# Patient Record
Sex: FEMALE | Race: WHITE | Employment: FULL TIME | URBAN - METROPOLITAN AREA
[De-identification: names, ages, dates, MRNs, and addresses within clinical notes are randomized per-mention and may not be internally consistent; named-entity substitution may affect disease eponyms.]

---

## 2017-12-19 ENCOUNTER — TELEPHONE (OUTPATIENT)
Dept: CARDIOLOGY CLINIC | Age: 47
End: 2017-12-19

## 2018-01-19 ENCOUNTER — HOSPITAL ENCOUNTER (OUTPATIENT)
Dept: MAMMOGRAPHY | Age: 48
Discharge: HOME | End: 2018-01-22
Attending: FAMILY MEDICINE | Admitting: FAMILY MEDICINE

## 2018-01-19 DIAGNOSIS — Z12.31 VISIT FOR SCREENING MAMMOGRAM: ICD-10-CM

## 2018-02-13 ENCOUNTER — HOSPITAL ENCOUNTER (OUTPATIENT)
Dept: WOMENS IMAGING | Age: 48
Discharge: HOME | End: 2018-02-13
Attending: OBSTETRICS & GYNECOLOGY | Admitting: OBSTETRICS & GYNECOLOGY

## 2018-02-13 DIAGNOSIS — R92.8 ABNORMAL MAMMOGRAM: ICD-10-CM

## 2018-02-13 DIAGNOSIS — N64.89 BREAST ASYMMETRY: ICD-10-CM

## 2019-01-02 ENCOUNTER — HOSPITAL ENCOUNTER (OUTPATIENT)
Dept: ULTRASOUND IMAGING | Age: 49
Discharge: HOME | End: 2019-01-02
Payer: COMMERCIAL

## 2019-01-02 ENCOUNTER — HOSPITAL ENCOUNTER (OUTPATIENT)
Dept: WOMENS IMAGING | Age: 49
Discharge: HOME | End: 2019-01-02
Payer: COMMERCIAL

## 2019-01-02 DIAGNOSIS — R92.8 ABNORMAL MAMMOGRAM: ICD-10-CM

## 2019-01-02 DIAGNOSIS — N64.89 BREAST ASYMMETRY: ICD-10-CM

## 2019-01-02 DIAGNOSIS — Z09 FOLLOW-UP EXAM: ICD-10-CM

## 2019-01-02 PROCEDURE — 76642 ULTRASOUND BREAST LIMITED: CPT

## 2019-01-02 PROCEDURE — 77066 DX MAMMO INCL CAD BI: CPT

## 2019-03-26 PROBLEM — Z95.828 PORT-A-CATH IN PLACE: Status: ACTIVE | Noted: 2018-02-12

## 2019-03-26 PROBLEM — C56.3 OVARIAN CANCER, BILATERAL (HCC): Status: ACTIVE | Noted: 2018-01-03

## 2019-03-26 PROBLEM — Z01.818 EXAMINATION PRIOR TO CHEMOTHERAPY: Status: ACTIVE | Noted: 2018-03-01

## 2019-03-26 PROBLEM — C54.1 ENDOMETRIAL CANCER (HCC): Status: ACTIVE | Noted: 2017-12-22

## 2019-04-01 ENCOUNTER — OFFICE VISIT (OUTPATIENT)
Dept: ORTHOPEDIC SURGERY | Age: 49
End: 2019-04-01
Payer: COMMERCIAL

## 2019-04-01 VITALS — WEIGHT: 293 LBS | BODY MASS INDEX: 48.82 KG/M2 | HEIGHT: 65 IN | RESPIRATION RATE: 18 BRPM

## 2019-04-01 DIAGNOSIS — M17.11 PRIMARY OSTEOARTHRITIS OF RIGHT KNEE: ICD-10-CM

## 2019-04-01 DIAGNOSIS — M17.12 PRIMARY OSTEOARTHRITIS OF LEFT KNEE: Primary | ICD-10-CM

## 2019-04-01 PROBLEM — C54.1 MALIGNANT NEOPLASM OF ENDOMETRIUM OF CORPUS UTERI (HCC): Status: ACTIVE | Noted: 2017-12-22

## 2019-04-01 PROBLEM — K58.0 IRRITABLE BOWEL SYNDROME WITH DIARRHEA: Status: ACTIVE | Noted: 2019-03-29

## 2019-04-01 PROBLEM — G89.29 CHRONIC PAIN OF LEFT KNEE: Status: ACTIVE | Noted: 2019-03-29

## 2019-04-01 PROBLEM — M25.562 CHRONIC PAIN OF LEFT KNEE: Status: ACTIVE | Noted: 2019-03-29

## 2019-04-01 PROBLEM — F41.9 ANXIETY: Status: ACTIVE | Noted: 2018-09-26

## 2019-04-01 PROCEDURE — 99203 OFFICE O/P NEW LOW 30 MIN: CPT | Performed by: PHYSICIAN ASSISTANT

## 2019-04-01 PROCEDURE — 20610 DRAIN/INJ JOINT/BURSA W/O US: CPT | Performed by: PHYSICIAN ASSISTANT

## 2019-04-01 RX ORDER — ALPRAZOLAM 0.5 MG/1
TABLET ORAL
COMMUNITY
Start: 2018-09-26 | End: 2019-12-18

## 2019-04-01 RX ORDER — DICYCLOMINE HCL 20 MG
TABLET ORAL
COMMUNITY
Start: 2019-02-05 | End: 2019-12-18

## 2019-04-01 RX ORDER — AZITHROMYCIN 250 MG/1
TABLET, FILM COATED ORAL
COMMUNITY
Start: 2019-03-29 | End: 2019-05-23

## 2019-04-01 ASSESSMENT — ENCOUNTER SYMPTOMS
WHEEZING: 1
EYES NEGATIVE: 1
GASTROINTESTINAL NEGATIVE: 1

## 2019-04-01 NOTE — PROGRESS NOTES
Scribe Authentication Statement  Arne Bence, scribed portions of this documentation for and in the presence of Rahul Hurt PA-C on 4/1/19 at 3:07 PM.    Provider Authentication Statement:  I, Olman Vasquez PA-C, personally performed the services described in this documentation and they were scribed in my presence by the above listed scribe. The documentation is both accurate and complete. Review of Systems   Constitutional: Negative. HENT: Negative. Eyes: Negative. Respiratory: Positive for wheezing. Cardiovascular: Negative. Gastrointestinal: Negative. Genitourinary: Negative. Musculoskeletal: Positive for arthralgias, gait problem, joint swelling and myalgias. Skin: Negative. Psychiatric/Behavioral: Negative. HPI:  Jeffery Villasenor is a 50y.o. year old female who complains of Bilateral knee pain with left>right. Left has been hurting for about 2 years or so and worse since she twisted it on 11/24/18. Right knee has been waxing and waning over the last several months. No fractures or surgeries on either knee and no consecutive treatment. She will take OTC NSAIDS PRN with little improvement. Left knee pain is over the medial and posterior aspect of the knee and the right knee pain is also over the medial side of the knee. She rates her left knee pain at a 7/10, her right knee pain at a 5/10. Pain is aching, worse with weightbearing and sitting for long periods of time and going to get up. Past Medical History:   Diagnosis Date    Diabetes ([facility] [address]     H/O cardiovascular stress test 3/17/2014    EF58% normal study    H/O echocardiogram 03/17/2014    EF 55%, normal LV systolic function, mildly dilated left atrium, normal sized right ventricle, normal valves, mild mitral and tricuspid regurg. No past surgical history on file.     Family History   Problem Relation Age of Onset    Diabetes Mother     Heart Disease Father     Diabetes Father     Hypertension Father     Cancer Father     Diabetes Sister     Cancer Maternal Grandmother     Diabetes Paternal Grandmother     Heart Attack Paternal Grandfather        Social History     Socioeconomic History    Marital status: Single     Spouse name: None    Number of children: None    Years of education: None    Highest education level: None   Occupational History    None   Social Needs    Financial resource strain: None    Food insecurity:     Worry: None     Inability: None    Transportation needs:     Medical: None     Non-medical: None   Tobacco Use    Smoking status: Passive Smoke Exposure - Never Smoker    Smokeless tobacco: Never Used   Substance and Sexual Activity    Alcohol use: No    Drug use: No    Sexual activity: None     Comment: single   Lifestyle    Physical activity:     Days per week: None     Minutes per session: None    Stress: None   Relationships    Social connections:     Talks on phone: None     Gets together: None     Attends [employer] service: None     Active member of club or organization: None     Attends meetings of clubs or organizations: None     Relationship status: None    Intimate partner violence:     Fear of current or ex partner: None     Emotionally abused: None     Physically abused: None     Forced sexual activity: None   Other Topics Concern    None   Social History Narrative    None       Current Outpatient Medications   Medication Sig Dispense Refill    azithromycin (ZITHROMAX) 250 MG tablet       dicyclomine (BENTYL) 20 MG tablet       glyBURIDE (DIABETA) 5 MG tablet       metFORMIN ER (GLUCOPHAGE-XR) 500 MG XR tablet       ALPRAZolam (XANAX) 0.5 MG tablet Take one tablet by mouth two times per day as needed for anxiety       No current facility-administered medications for this visit.         No Known Allergies    Review of Systems:  See above      Physical Exam:   Resp 18   Ht 5' 5\" (1.651 m)   Wt (!) 348 lb 9.6 oz (158.1 kg)   BMI 58.01 kg/m² Gait is Abnormal. The patient can bear weight on the injured extremity. Gen/Psych:Examination reveals a pleasant individual in no acute distress. The patient is oriented to time, place and person. The patient's mood and affect are appropriate. Patient appears well nourished. Body habitus is morbidly obese according to BMI. Lymph:  no obvious lymphedema in bilateral lower extremities     Skin intact in bilateral lower extremities with no ulcerations, lesions, rash, erythema. Vascular: There are mild varicosities in bilateral lower extremities, sensation intact to light touch over bilateral lower extremities. left leg/knee exam:  Leg alignment:     mild varus  Quadriceps/hamstring atrophy:   No  Knee effusion:    mild  Knee erythema:   no  ROM:     0-100°  Tenderness at:   Medial joint line    right leg/knee exam:  Leg alignment:     mild varus  Quadriceps/hamstring atrophy:   no  Knee effusion:    no   Knee erythema:   no  ROM:     0-100°  Tenderness at:   Medial joint line    Knee strength is 5/5 flexion and extension  There is + L2-S1 motor and sensory function. Outside record review: ER records and historical medical records    Left knee x-rays, four views,were obtained and reviewed and show bone-on-bone articulation in the medial compartment with osteophyte formation in the lateral tibial plateau, patellofemoral compartment. Subchondral sclerosis of the medial tibial plateau, no fractures, no dislocations. Right  knee x-rays, four views,were obtained and reviewed and show bone-on-bone articulation in the medial compartment of the right knee, subchondral sclerosis of the tibial plateau, lateral tibial plateau shows periarticular osteophytes with no fractures, no dislocations. Impression:  bilateral knee DJD- severe and symptomatic bilateral medial compartment DJD      Plan:  Natural history and expected course discussed. Questions answered.     Steroid injections given in both knees  Rest both knees 24-48 hours   Work on ROM and strengthening of knees and legs   OTC NSAIDS and Tylenol as needed if tolerated   Follow up as needed in 6 weeks if any persistent symptoms     The patient was advised that NSAID-type medications have two very important potential side effects: gastrointestinal irritation including hemorrhage and renal injuries. She was asked to take the medication with food and to stop if she experiences any GI upset. I asked her to call for vomiting, abdominal pain or black/bloody stools. The patient expresses understanding of these issues and questions were answered. Add Tylenol (also known as acetaminophen) as needed   Take 2 extra strength (500 mg) or 3 regular strength (325 mg) up to three times a day  It is OK to take Tylenol in conjunction with NSAID's (Advil, Aleve, Naprosyn, etc)  If taking other medications that have acetaminophen ensure total acetaminophen dose for any 24 period is less than 3,000 mg      right knee injection procedure note    Pre-procedure diagnosis:  right knee DJD    Post-procedure diagnosis:  same    Procedure: The planned procedure/risks/benefits/alternatives were discussed with the patient. Risks include, but are not limited to, increased pain, drug reaction, infection, bleeding, lack of improvement, neurovascular injury, and increased blood sugar levels. The patient understood and all of their questions were answered. The right knee was prepped with alcohol and betadine, then anesthetized with Ethyl Chloride spray. A 22 gauge needle was advanced into the inferior-lateral joint without difficulty. The joint was then injected with 3 ml 1% lidocaine and 2 ml of depo-medrol (80mg/ml). The injection site was cleansed with isopropyl alcohol and a band-aid was placed. Complications:  None, the patient   the procedure well. Instructions: The patient was advised to rest the knee and decrease activity for the next 24 to 48 hours.  May use

## 2019-04-01 NOTE — PATIENT INSTRUCTIONS
Steroid injections   Rest both knees 24-48 hours   Work on ROM and strengthening of knees and legs   OTC NSAIDS and Tylenol as needed if tolerated   Follow up as needed in 6 weeks if any persistent symptoms     The patient was advised that NSAID-type medications have two very important potential side effects: gastrointestinal irritation including hemorrhage and renal injuries. She was asked to take the medication with food and to stop if she experiences any GI upset. I asked her to call for vomiting, abdominal pain or black/bloody stools. The patient expresses understanding of these issues and questions were answered.     Add Tylenol (also known as acetaminophen) as needed   Take 2 extra strength (500 mg) or 3 regular strength (325 mg) up to three times a day  It is OK to take Tylenol in conjunction with NSAID's (Advil, Aleve, Naprosyn, etc)  If taking other medications that have acetaminophen ensure total acetaminophen dose for any 24 period is less than 3,000 mg

## 2019-04-25 PROBLEM — Z01.818 EXAMINATION PRIOR TO CHEMOTHERAPY: Status: RESOLVED | Noted: 2018-03-01 | Resolved: 2019-04-25

## 2019-05-23 ENCOUNTER — OFFICE VISIT (OUTPATIENT)
Dept: ORTHOPEDIC SURGERY | Age: 49
End: 2019-05-23
Payer: COMMERCIAL

## 2019-05-23 VITALS — RESPIRATION RATE: 16 BRPM | WEIGHT: 293 LBS | BODY MASS INDEX: 57.41 KG/M2

## 2019-05-23 DIAGNOSIS — E66.01 CLASS 3 SEVERE OBESITY WITH SERIOUS COMORBIDITY AND BODY MASS INDEX (BMI) OF 50.0 TO 59.9 IN ADULT, UNSPECIFIED OBESITY TYPE (HCC): ICD-10-CM

## 2019-05-23 DIAGNOSIS — M17.11 PRIMARY OSTEOARTHRITIS OF RIGHT KNEE: ICD-10-CM

## 2019-05-23 DIAGNOSIS — M17.12 PRIMARY OSTEOARTHRITIS OF LEFT KNEE: Primary | ICD-10-CM

## 2019-05-23 DIAGNOSIS — E66.01 MORBID OBESITY WITH BMI OF 50.0-59.9, ADULT (HCC): ICD-10-CM

## 2019-05-23 PROCEDURE — 99212 OFFICE O/P EST SF 10 MIN: CPT | Performed by: PHYSICIAN ASSISTANT

## 2019-05-23 RX ORDER — OMEGA-3 FATTY ACIDS/FISH OIL 300-1000MG
1 CAPSULE ORAL PRN
COMMUNITY

## 2019-05-23 ASSESSMENT — ENCOUNTER SYMPTOMS
EYES NEGATIVE: 1
WHEEZING: 1
GASTROINTESTINAL NEGATIVE: 1

## 2019-05-23 NOTE — PATIENT INSTRUCTIONS
Referral to Dr. Lisa Burton or Dr. Abelino Ramires for weight loss management  Follow up with Dr. Ron Perez as needed but not before meeting with Dr. Belen vega prescribed

## 2019-05-31 ENCOUNTER — TELEPHONE (OUTPATIENT)
Dept: ORTHOPEDIC SURGERY | Age: 49
End: 2019-05-31

## 2019-05-31 NOTE — TELEPHONE ENCOUNTER
Referral to Dr. Jose F Fishman or Dr. Mi Rodriguez for weight loss management. Patient called in stating that she can not do the weight lost due to insurance. Oncology doctor is sending her to a gym for her chemo.

## 2019-06-10 ENCOUNTER — HOSPITAL ENCOUNTER (OUTPATIENT)
Dept: WOMENS IMAGING | Age: 49
Discharge: HOME | End: 2019-06-10
Payer: COMMERCIAL

## 2019-06-10 DIAGNOSIS — Z12.31 ENCOUNTER FOR SCREENING MAMMOGRAM FOR BREAST CANCER: ICD-10-CM

## 2019-08-15 ENCOUNTER — OFFICE VISIT (OUTPATIENT)
Dept: ORTHOPEDIC SURGERY | Age: 49
End: 2019-08-15
Payer: COMMERCIAL

## 2019-08-15 VITALS
BODY MASS INDEX: 48.82 KG/M2 | WEIGHT: 293 LBS | RESPIRATION RATE: 14 BRPM | HEIGHT: 65 IN | HEART RATE: 87 BPM | OXYGEN SATURATION: 97 %

## 2019-08-15 DIAGNOSIS — M17.12 PRIMARY OSTEOARTHRITIS OF LEFT KNEE: Primary | ICD-10-CM

## 2019-08-15 DIAGNOSIS — M17.11 PRIMARY OSTEOARTHRITIS OF RIGHT KNEE: ICD-10-CM

## 2019-08-15 PROCEDURE — 20610 DRAIN/INJ JOINT/BURSA W/O US: CPT | Performed by: PHYSICIAN ASSISTANT

## 2019-08-15 PROCEDURE — 99212 OFFICE O/P EST SF 10 MIN: CPT | Performed by: PHYSICIAN ASSISTANT

## 2019-08-15 ASSESSMENT — ENCOUNTER SYMPTOMS
EYES NEGATIVE: 1
GASTROINTESTINAL NEGATIVE: 1
WHEEZING: 1

## 2019-08-15 NOTE — PROGRESS NOTES
I reviewed and agree with the portions of the HPI, review of systems, vital documentation and plan performed by my staff and have added/addended where appropriate. Review of Systems   Constitutional: Negative. HENT: Negative. Eyes: Negative. Respiratory: Positive for wheezing. Cardiovascular: Negative. Gastrointestinal: Negative. Genitourinary: Negative. Musculoskeletal: Positive for arthralgias, gait problem, joint swelling and myalgias. Skin: Negative. Psychiatric/Behavioral: Negative. HPI:  Ruben Gaines is a 50y.o. year old female here for a follow up visit to receive steroid injection for bilateral knee prior to her return to school. Last injection was administered about four months ago on April 1st, but only offered relief for about a month before pain returned. Patient was unable to follow up with weight loss management due to insurance denial, but has been participating with [facility] [address] local cancer organization to do core strengthening. Patient continues to have bilateral knee pain at this time along the anterior aspect of the knee with radiation of pain down the bilateral with radiation of pain worse in the left leg. Associated sx: numbness and tingling of the toes but contributes this to after effects of chemo. Pain is worse upon long periods of ambulation and relieved by the use of compound cream. No new injuries reported. Hx of endo and ovarian cancer. Past Medical History:   Diagnosis Date    Diabetes ([facility] [address]     H/O cardiovascular stress test 3/17/2014    EF58% normal study    H/O echocardiogram 03/17/2014    EF 55%, normal LV systolic function, mildly dilated left atrium, normal sized right ventricle, normal valves, mild mitral and tricuspid regurg. No past surgical history on file.     Family History   Problem Relation Age of Onset    Diabetes Mother     Heart Disease Father     Diabetes Father     Hypertension Father     Cancer Father    Terry Finley kg/m²        Gait is Abnormal. The patient can bear weight on the injured extremity. Gen/Psych:Examination reveals a pleasant individual in no acute distress. The patient is oriented to time, place and person. The patient's mood and affect are appropriate. Patient appears well nourished. Body habitus is morbidly obese according to BMI. Lymph:  no obvious lymphedema in bilateral lower extremities     Skin intact in bilateral lower extremities with no ulcerations, lesions, rash, erythema. Vascular: There are mild varicosities in bilateral lower extremities, sensation intact to light touch over bilateral lower extremities. left leg/knee exam:  Leg alignment:     mild varus  Quadriceps/hamstring atrophy:   No  Knee effusion:    mild  Knee erythema:   no  ROM:     0-110°  Tenderness at:   Medial joint line    right leg/knee exam:  Leg alignment:     mild varus  Quadriceps/hamstring atrophy:   no  Knee effusion:    no   Knee erythema:   no  ROM:     0-110°  Tenderness at:   Medial joint line    Knee strength is 5/5 flexion and extension  There is + L2-S1 motor and sensory function. record review: ER records and historical medical records, office notes       Impression:  bilateral knee DJD- severe and symptomatic bilateral medial compartment DJD    Morbid obesity      Plan:  Natural history and expected course discussed. Questions answered. Steroid injections given for bilateral knee  Rest both knees 24-48 hours   Work on ROM and strengthening of knees and legs   OTC NSAIDS and Tylenol as needed if tolerated     Right  knee injection procedure note    Pre-procedure diagnosis: Right knee DJD    Post-procedure diagnosis:  same    Procedure: The planned procedure/risks/benefits/alternatives were discussed with the patient. Risks include, but are not limited to, increased pain, drug reaction, infection, bleeding, lack of improvement, neurovascular injury, and increased blood sugar levels.   The patient

## 2019-08-15 NOTE — PATIENT INSTRUCTIONS
Steroid injections given for bilateral knee  Rest both knees 24-48 hours   Work on ROM and strengthening of knees and legs   OTC NSAIDS and Tylenol as needed if tolerated

## 2019-09-20 ENCOUNTER — HOSPITAL ENCOUNTER (OUTPATIENT)
Dept: GENERAL RADIOLOGY | Age: 49
Discharge: HOME | End: 2019-09-20
Payer: COMMERCIAL

## 2019-09-20 ENCOUNTER — HOSPITAL ENCOUNTER (OUTPATIENT)
Age: 49
Discharge: HOME | End: 2019-09-20
Payer: COMMERCIAL

## 2019-09-20 DIAGNOSIS — N28.89 URETERAL FISTULA: ICD-10-CM

## 2019-09-20 PROCEDURE — 71046 X-RAY EXAM CHEST 2 VIEWS: CPT

## 2019-11-10 ENCOUNTER — HOSPITAL ENCOUNTER (EMERGENCY)
Age: 49
Discharge: HOME | End: 2019-11-10
Payer: COMMERCIAL

## 2019-11-10 VITALS
SYSTOLIC BLOOD PRESSURE: 153 MMHG | OXYGEN SATURATION: 96 % | BODY MASS INDEX: 43.4 KG/M2 | TEMPERATURE: 97.6 F | DIASTOLIC BLOOD PRESSURE: 84 MMHG | HEIGHT: 69 IN | WEIGHT: 293 LBS | HEART RATE: 91 BPM | RESPIRATION RATE: 18 BRPM

## 2019-11-10 DIAGNOSIS — J04.0 LARYNGITIS: Primary | ICD-10-CM

## 2019-11-10 PROCEDURE — 99282 EMERGENCY DEPT VISIT SF MDM: CPT

## 2019-12-18 ENCOUNTER — OFFICE VISIT (OUTPATIENT)
Dept: ORTHOPEDIC SURGERY | Age: 49
End: 2019-12-18
Payer: COMMERCIAL

## 2019-12-18 VITALS — RESPIRATION RATE: 18 BRPM | BODY MASS INDEX: 48.82 KG/M2 | HEIGHT: 65 IN | WEIGHT: 293 LBS

## 2019-12-18 DIAGNOSIS — M17.12 PRIMARY OSTEOARTHRITIS OF LEFT KNEE: ICD-10-CM

## 2019-12-18 DIAGNOSIS — M17.11 PRIMARY OSTEOARTHRITIS OF RIGHT KNEE: Primary | ICD-10-CM

## 2019-12-18 PROCEDURE — 99212 OFFICE O/P EST SF 10 MIN: CPT | Performed by: PHYSICIAN ASSISTANT

## 2019-12-18 PROCEDURE — 20610 DRAIN/INJ JOINT/BURSA W/O US: CPT | Performed by: PHYSICIAN ASSISTANT

## 2019-12-18 RX ORDER — ZOLPIDEM TARTRATE 10 MG/1
10 TABLET ORAL NIGHTLY PRN
COMMUNITY
Start: 2019-07-03

## 2019-12-18 ASSESSMENT — ENCOUNTER SYMPTOMS
EYES NEGATIVE: 1
GASTROINTESTINAL NEGATIVE: 1

## 2020-01-08 ENCOUNTER — ANESTHESIA EVENT (OUTPATIENT)
Dept: ENDOSCOPY | Age: 50
End: 2020-01-08
Payer: COMMERCIAL

## 2020-01-08 NOTE — PROGRESS NOTES
Surgery: [email or web address]                         Arrival time: 0930  Nothing to eat or drink after midnight unless instructed to take certain medications by the doctor or the nurse the am of surgery  Arrive at the front information desk -1st floor /[facility] is on [address] [facility]  Please leave money and all other valuables at home. Wear comfortable clothing. If you wear contacts please bring a case. No make up. You may be asked to remove rings or body piercing. Please bring insurance cards and picture ID am of procedure. Please bring any consent or paper work from your doctor. If you become ill,such as a cold, sore throat or fever contact your doctor. Please bathe or shower am of procedure.   Medications to take AM of procedure:     Any questions call [facility]  [phone]

## 2020-01-09 ENCOUNTER — HOSPITAL ENCOUNTER (OUTPATIENT)
Age: 50
Setting detail: OUTPATIENT SURGERY
Discharge: HOME | End: 2020-01-09
Attending: INTERNAL MEDICINE | Admitting: INTERNAL MEDICINE
Payer: COMMERCIAL

## 2020-01-09 ENCOUNTER — ANESTHESIA (OUTPATIENT)
Dept: ENDOSCOPY | Age: 50
End: 2020-01-09
Payer: COMMERCIAL

## 2020-01-09 VITALS — OXYGEN SATURATION: 100 % | DIASTOLIC BLOOD PRESSURE: 46 MMHG | SYSTOLIC BLOOD PRESSURE: 117 MMHG

## 2020-01-09 VITALS
OXYGEN SATURATION: 100 % | DIASTOLIC BLOOD PRESSURE: 93 MMHG | RESPIRATION RATE: 16 BRPM | WEIGHT: 293 LBS | BODY MASS INDEX: 48.82 KG/M2 | TEMPERATURE: 96.7 F | HEIGHT: 65 IN | SYSTOLIC BLOOD PRESSURE: 137 MMHG | HEART RATE: 70 BPM

## 2020-01-09 LAB — GLUCOSE BLD-MCNC: 149 MG/DL (ref 70–99)

## 2020-01-09 PROCEDURE — 82962 GLUCOSE BLOOD TEST: CPT

## 2020-01-09 PROCEDURE — 3700000001 HC ADD 15 MINUTES (ANESTHESIA): Performed by: INTERNAL MEDICINE

## 2020-01-09 PROCEDURE — 2580000003 HC RX 258: Performed by: ANESTHESIOLOGY

## 2020-01-09 PROCEDURE — 3609010300 HC COLONOSCOPY W/BIOPSY SINGLE/MULTIPLE: Performed by: INTERNAL MEDICINE

## 2020-01-09 PROCEDURE — 88342 IMHCHEM/IMCYTCHM 1ST ANTB: CPT

## 2020-01-09 PROCEDURE — 2709999900 HC NON-CHARGEABLE SUPPLY: Performed by: INTERNAL MEDICINE

## 2020-01-09 PROCEDURE — 88305 TISSUE EXAM BY PATHOLOGIST: CPT

## 2020-01-09 PROCEDURE — 7100000010 HC PHASE II RECOVERY - FIRST 15 MIN: Performed by: INTERNAL MEDICINE

## 2020-01-09 PROCEDURE — 3700000000 HC ANESTHESIA ATTENDED CARE: Performed by: INTERNAL MEDICINE

## 2020-01-09 PROCEDURE — 3609012400 HC EGD TRANSORAL BIOPSY SINGLE/MULTIPLE: Performed by: INTERNAL MEDICINE

## 2020-01-09 PROCEDURE — 6360000002 HC RX W HCPCS: Performed by: NURSE ANESTHETIST, CERTIFIED REGISTERED

## 2020-01-09 PROCEDURE — 2500000003 HC RX 250 WO HCPCS: Performed by: NURSE ANESTHETIST, CERTIFIED REGISTERED

## 2020-01-09 PROCEDURE — 6360000002 HC RX W HCPCS

## 2020-01-09 PROCEDURE — 7100000011 HC PHASE II RECOVERY - ADDTL 15 MIN: Performed by: INTERNAL MEDICINE

## 2020-01-09 RX ORDER — PANTOPRAZOLE SODIUM 40 MG/10ML
40 INJECTION, POWDER, LYOPHILIZED, FOR SOLUTION INTRAVENOUS DAILY
COMMUNITY

## 2020-01-09 RX ORDER — PROPOFOL 10 MG/ML
INJECTION, EMULSION INTRAVENOUS PRN
Status: DISCONTINUED | OUTPATIENT
Start: 2020-01-09 | End: 2020-01-09 | Stop reason: SDUPTHER

## 2020-01-09 RX ORDER — SODIUM CHLORIDE, SODIUM LACTATE, POTASSIUM CHLORIDE, CALCIUM CHLORIDE 600; 310; 30; 20 MG/100ML; MG/100ML; MG/100ML; MG/100ML
INJECTION, SOLUTION INTRAVENOUS CONTINUOUS
Status: DISCONTINUED | OUTPATIENT
Start: 2020-01-09 | End: 2020-01-09 | Stop reason: HOSPADM

## 2020-01-09 RX ORDER — LIDOCAINE HYDROCHLORIDE 20 MG/ML
INJECTION, SOLUTION EPIDURAL; INFILTRATION; INTRACAUDAL; PERINEURAL PRN
Status: DISCONTINUED | OUTPATIENT
Start: 2020-01-09 | End: 2020-01-09 | Stop reason: SDUPTHER

## 2020-01-09 RX ADMIN — PROPOFOL 20 MG: 10 INJECTION, EMULSION INTRAVENOUS at 11:58

## 2020-01-09 RX ADMIN — PROPOFOL 50 MG: 10 INJECTION, EMULSION INTRAVENOUS at 11:40

## 2020-01-09 RX ADMIN — LIDOCAINE HYDROCHLORIDE 50 MG: 20 INJECTION, SOLUTION EPIDURAL; INFILTRATION; INTRACAUDAL; PERINEURAL at 11:40

## 2020-01-09 RX ADMIN — PROPOFOL 30 MG: 10 INJECTION, EMULSION INTRAVENOUS at 11:50

## 2020-01-09 RX ADMIN — LIDOCAINE HYDROCHLORIDE 50 MG: 20 INJECTION, SOLUTION EPIDURAL; INFILTRATION; INTRACAUDAL; PERINEURAL at 11:41

## 2020-01-09 RX ADMIN — PROPOFOL 30 MG: 10 INJECTION, EMULSION INTRAVENOUS at 11:44

## 2020-01-09 RX ADMIN — PROPOFOL 30 MG: 10 INJECTION, EMULSION INTRAVENOUS at 11:53

## 2020-01-09 RX ADMIN — SODIUM CHLORIDE, POTASSIUM CHLORIDE, SODIUM LACTATE AND CALCIUM CHLORIDE: 600; 310; 30; 20 INJECTION, SOLUTION INTRAVENOUS at 11:37

## 2020-01-09 RX ADMIN — PROPOFOL 30 MG: 10 INJECTION, EMULSION INTRAVENOUS at 11:47

## 2020-01-09 RX ADMIN — PROPOFOL 50 MG: 10 INJECTION, EMULSION INTRAVENOUS at 11:41

## 2020-01-09 ASSESSMENT — PAIN SCALES - GENERAL: PAINLEVEL_OUTOF10: 0

## 2020-01-09 NOTE — PROGRESS NOTES
Patient returned to room from endoscopy, report received from [facility]. A+Ox4,  VSS (see doc flow), assessment completed as per doc flow. Patient has no c/o pain. Beverage offered. Call light in reach, bed in low position. RN to continue to monitor. Will call to waiting room for family.

## 2020-01-09 NOTE — OP NOTE
[facility]      BRIEF OP REPORT:    Impression:    1) EGD: Grade II esophagitis and mild gastritis H pylori biopsy done    2)C scope mild edematous mucosa all over biopsy of asc and sophia colon for colitis done, Grade II Hemorrhoids seen           Suggest:   1) PPI QD, GERD measures   2) High Fiber Diet, FU 2 weeks Recall C scope at age 48        Full EGD/COLONOSCOPY report available by going to \"chart review\" then \"procedures\" then  \"EGD/Colonoscopy\"  then \"View Endoscopy Report\"

## 2020-01-09 NOTE — H&P
[address] [facility]   Pre-operative History and Physical    Patient: Sunshine Lang  : 1970      History Obtained From:  patient        HISTORY OF PRESENT ILLNESS:                The patient is a 52 y.o. female with significant past medical history as below who presents for EGD and C scope    Indication abd pain    Past Medical History:        Diagnosis Date    Arthritis     Cancer ([facility] [address]     [facility] treatment Ovarian and endometrial CA    Diabetes ([facility] [address]    [employer] H/O cardiovascular stress test 3/17/2014    EF58% normal study    H/O echocardiogram 2014    EF 55%, normal LV systolic function, mildly dilated left atrium, normal sized right ventricle, normal valves, mild mitral and tricuspid regurg. Past Surgical History:        Procedure Laterality Date    HYSTERECTOMY      2017    TONSILLECTOMY           Current Medications:    Medications    Prior to Admission medications    Medication Sig Start Date End Date Taking? Authorizing Provider   pantoprazole (PROTONIX) 40 MG injection Infuse 40 mg intravenously daily   Yes Historical Provider, MD   zolpidem (AMBIEN) 10 MG tablet Take 10 mg by mouth nightly as needed. 7/3/19  Yes Historical Provider, MD   ibuprofen (ADVIL) 200 MG CAPS Take 1 capsule by mouth as needed    Yes Historical Provider, MD   glyBURIDE (DIABETA) 5 MG tablet 2 times daily (with meals)  14   Historical Provider, MD   metFORMIN ER (GLUCOPHAGE-XR) 500 MG XR tablet 2 times daily  14   Historical Provider, MD      Scheduled Medications:   Infusions:    lactated ringers       PRN Medications: Allergies: No Known Allergies      Allergies:  Patient has no known allergies. Social History:   TOBACCO:   reports that she is a non-smoker but has been exposed to tobacco smoke. She has never used smokeless tobacco.  ETOH:   reports no history of alcohol use.     Family History:       Problem Relation Age of Onset  Diabetes Mother     Heart Disease Father     Diabetes Father     Hypertension Father     Cancer Father     Diabetes Sister     Cancer Maternal Grandmother     Diabetes Paternal Grandmother     Heart Attack Paternal Grandfather        REVIEW OF SYSTEMS:    Negative except for HPI        PHYSICAL EXAM:      Vitals:    BP (!) 183/88   Pulse 90   Temp 96.6 °F (35.9 °C) (Temporal)   Resp 17   Ht 5' 5\" (1.651 m)   Wt (!) 345 lb (156.5 kg)   SpO2 97%   BMI 57.41 kg/m²     General Appearance:    Alert, cooperative, no distress, appears stated age   HEENT:    NO anemia, No jaundice , no Cyanosis, Supple neck   Neck:   Supple, symmetrical, trachea midline, no adenopathy;     thyroid:    Lungs:     Clear to auscultation bilaterally, respirations unlabored   Chest Wall:    No tenderness or deformity    Heart:    Regular rate and rhythm, S1 and S2 normal, no murmur, rub   or gallop   Abdomen:     Soft, non-tender, bowel sounds active all four quadrants,     no masses, no organomegaly, no ascitis   Rectal:    Planned at time of C scope   Extremities:   Extremities normal, atraumatic, no cyanosis or edema   Pulses:   2+ and symmetric all extremities   Skin:   Skin color, texture, turgor normal, no rashes or lesions   Lymph nodes:   No abnormality   Neurologic:   No focal deficits, moving all four extremities      ASA Grade:  ASA 3 - Patient with moderate systemic disease with functional limitations  Air [address]:    Prior Anesthesia complication: NILL    ASSESSMENT AND PLAN:    1. Patient is a 52 y.o. female here for EGD and colonoscopy with deep sedation  2. Procedure options, risks and benefits reviewed with patient. Patient expresses understanding.     Melissa Cardenas  1/9/2020  10:13 AM

## 2020-01-09 NOTE — ANESTHESIA POSTPROCEDURE EVALUATION
Department of Anesthesiology  Postprocedure Note    Patient: Soy Peterson  MRN: [id number]  YOB: 1970  Date of evaluation: 1/9/2020  Time:  12:21 PM     Procedure Summary     Date:  01/09/20 Room / Location:  [address] [place] [address]    Anesthesia Start:  1137 Anesthesia Stop:      Procedures:       COLONOSCOPY WITH BIOPSY (N/A )      EGD BIOPSY (N/A ) Diagnosis:  (BURPING , PERSONAL H/O MALIGNANT NEOPLASM OF OVARY , GENERALIZED ABDOMINAL PAIN)    Surgeon:  Rosibel Shankar MD Responsible Provider:  Alexi Lui MD    Anesthesia Type:  MAC ASA Status:  4          Anesthesia Type: MAC    Peter Phase I:      Peter Phase II:      Last vitals: Reviewed and per EMR flowsheets.        Anesthesia Post Evaluation    Patient location during evaluation: bedside  Patient participation: complete - patient participated  Level of consciousness: sleepy but conscious  Pain score: 0  Airway patency: patent  Nausea & Vomiting: no nausea and no vomiting  Complications: no  Cardiovascular status: blood pressure returned to baseline and hemodynamically stable  Respiratory status: acceptable, spontaneous ventilation and room air  Hydration status: euvolemic [facility]    Chief Complaint   Patient presents with     Nursing Home Acute       HPI:    Benson Kapoor is a 89 year old  (5/20/1925), who is being seen today for an episodic care [facility] at [facility].    HPI information obtained from: facility chart records and facility staff. Today's concern is:  Patient tested positive for influenza B last week was treated with tamiflu   She is a hospice patient with [facility].   Her status was very poor last week with oxygen at 40% despite supplement oxygen, decreased LOC. On Tuesday she was improving but still with lingering cough.   She was started on prednisone 40 mg daily for 5 days   She is much better today   She was requesting to go out to dining room for meals.   Isolation precautions d/c'ed   By the afternoon she was bright sitting in wheelchair and participating in activities.     REVIEW OF SYSTEMS:  4 point ROS including Respiratory, CV, GI and , other than that noted in the HPI,  is negative    /76  Pulse 89  Temp 98.6  F (37  C)  Resp 20  Wt 128 lb 8 oz (58.3 kg)  SpO2 93%  BMI 20.74 kg/m2  GENERAL APPEARANCE:  Alert, in no distress  RESP:  respiratory effort and palpation of chest normal, auscultation of lungs 2 , no respiratory distress  CV:  Palpation and auscultation of heart done , rate and rhythm clear but diminished, no murmur, no peripheral edema  ABDOMEN:  normal bowel sounds, soft, nontender, no hepatosplenomegaly or other masses  M/S:   Gait and station wheelchair bound, Digits and nails intact  SKIN:  Inspection and Palpation of skin and subcutaneous tissue   NEURO: 2-12 in normal limits and at patient's baseline  PSYCH:  insight and judgement, memory impaired , affect and mood normal      ASSESSMENT/PLAN:     Influenza B  Hospice care patient  Mild persistent asthma without complication  Chronic bronchitis, unspecified chronic bronchitis type (H)      Orders:  1.  DC isolation precautions      Total  time spent with patient visit at the skilled nursing facility was 25 min including patient visit and review of past records. Greater than 50% of total time spent with counseling and coordinating care due to impaired memory.    Electronically signed by:  Rajni Shah NP

## 2020-02-26 ENCOUNTER — HOSPITAL ENCOUNTER (OUTPATIENT)
Dept: WOMENS IMAGING | Age: 50
Discharge: HOME | End: 2020-02-26
Payer: COMMERCIAL

## 2020-02-26 PROCEDURE — 77067 SCR MAMMO BI INCL CAD: CPT

## 2020-04-17 ENCOUNTER — HOSPITAL ENCOUNTER (OUTPATIENT)
Dept: CT IMAGING | Age: 50
Discharge: HOME | End: 2020-04-17
Payer: COMMERCIAL

## 2020-04-17 PROCEDURE — 74150 CT ABDOMEN W/O CONTRAST: CPT

## 2021-05-07 ENCOUNTER — HOSPITAL ENCOUNTER (OUTPATIENT)
Dept: ULTRASOUND IMAGING | Age: 51
Discharge: HOME | End: 2021-05-07
Payer: COMMERCIAL

## 2021-05-07 DIAGNOSIS — N28.89 URETERAL FISTULA: ICD-10-CM

## 2021-05-07 PROCEDURE — 76775 US EXAM ABDO BACK WALL LIM: CPT

## 2021-10-18 ENCOUNTER — OFFICE VISIT (OUTPATIENT)
Dept: ORTHOPEDIC SURGERY | Age: 51
End: 2021-10-18
Payer: COMMERCIAL

## 2021-10-18 VITALS
BODY MASS INDEX: 48.82 KG/M2 | WEIGHT: 293 LBS | OXYGEN SATURATION: 93 % | RESPIRATION RATE: 16 BRPM | HEART RATE: 103 BPM | HEIGHT: 65 IN

## 2021-10-18 DIAGNOSIS — M17.12 PRIMARY OSTEOARTHRITIS OF LEFT KNEE: Primary | ICD-10-CM

## 2021-10-18 PROCEDURE — 20610 DRAIN/INJ JOINT/BURSA W/O US: CPT | Performed by: PHYSICIAN ASSISTANT

## 2021-10-18 PROCEDURE — 99213 OFFICE O/P EST LOW 20 MIN: CPT | Performed by: PHYSICIAN ASSISTANT

## 2021-10-18 NOTE — PATIENT INSTRUCTIONS
Steroid injection   Rest the knee for 24-48 hours  Weightbearing and activities as tolerated  May take Ibuprofen or Motrin as needed  Rest, ice, and elevate as needed  Work on ROM and strengthening exercises as discussed  Follow up as needed

## 2021-10-18 NOTE — PROGRESS NOTES
Review of Systems   Constitutional: Negative. HENT: Negative. Eyes: Negative. Respiratory: Negative. Cardiovascular: Negative. Gastrointestinal: Negative. Genitourinary: Negative. Musculoskeletal: Positive for arthralgias and myalgias. Skin: Negative. Neurological: Negative. Psychiatric/Behavioral: Negative. HPI:  Angle De La Cruz is a 46 y.o. female that presents the office today to have her left knee looked at again. She was seen in the office before about 2 years ago and had an injection in the left knee. She states that the left knee injection at that time did work well but she started to have more pain in the left knee. She rates her pain at a 6/10, aching in nature. Past Medical History:   Diagnosis Date    Arthritis     Cancer [facility] treatment Ovarian and endometrial CA    Diabetes ([facility] [address]     H/O cardiovascular stress test 3/17/2014    EF58% normal study    H/O echocardiogram 03/17/2014    EF 55%, normal LV systolic function, mildly dilated left atrium, normal sized right ventricle, normal valves, mild mitral and tricuspid regurg.        Past Surgical History:   Procedure Laterality Date    COLONOSCOPY N/A 1/9/2020    COLONOSCOPY WITH BIOPSY performed by Lorie Rivers MD at [address] [place] [address]      12/22/2017    TONSILLECTOMY      UPPER GASTROINTESTINAL ENDOSCOPY N/A 1/9/2020    EGD BIOPSY performed by Lorie Rivers MD at [facility] ENDOSCOPY       Family History   Problem Relation Age of Onset    Diabetes Mother     Heart Disease Father     Diabetes Father     Hypertension Father     Cancer Father     Diabetes Sister     Cancer Maternal Grandmother     Diabetes Paternal Grandmother     Heart Attack Paternal Grandfather        Social History     Socioeconomic History    Marital status: Single     Spouse name: None    Number of children: None    Years of education: None    Highest education level: None   Occupational History    None   Tobacco Use    Smoking status: Passive Smoke Exposure - Never Smoker    Smokeless tobacco: Never Used   Vaping Use    Vaping Use: Never used   Substance and Sexual Activity    Alcohol use: No    Drug use: No    Sexual activity: None     Comment: single   Other Topics Concern    None   Social History Narrative    None     Social Determinants of Health     Financial Resource Strain:     Difficulty of Paying Living Expenses:    Food Insecurity:     Worried About Running Out of Food in the Last Year:     Ran Out of Food in the Last Year:    Transportation Needs:     Lack of Transportation (Medical):  Lack of Transportation (Non-Medical):    Physical Activity:     Days of Exercise per Week:     Minutes of Exercise per Session:    Stress:     Feeling of Stress :    Social Connections:     Frequency of Communication with Friends and Family:     Frequency of Social Gatherings with Friends and Family:     Attends Jainism Services:     Active Member of Clubs or Organizations:     Attends Club or Organization Meetings:     Marital Status:    Intimate Partner Violence:     Fear of Current or Ex-Partner:     Emotionally Abused:     Physically Abused:     Sexually Abused:        Current Outpatient Medications   Medication Sig Dispense Refill    SITagliptin (JANUVIA) 50 MG tablet Take 50 mg by mouth daily      glyBURIDE (DIABETA) 5 MG tablet 2 times daily (with meals)       metFORMIN ER (GLUCOPHAGE-XR) 500 MG XR tablet 2 times daily       pantoprazole (PROTONIX) 40 MG injection Infuse 40 mg intravenously daily (Patient not taking: Reported on 10/18/2021)      zolpidem (AMBIEN) 10 MG tablet Take 10 mg by mouth nightly as needed. (Patient not taking: Reported on 10/18/2021)      ibuprofen (ADVIL) 200 MG CAPS Take 1 capsule by mouth as needed  (Patient not taking: Reported on 10/18/2021)       No current facility-administered medications for this visit.        No Known Allergies    Review of Systems:  See above      Physical Exam:   Pulse 103   Resp 16   Ht 5' 5\" (1.651 m)   Wt (!) 345 lb (156.5 kg)   SpO2 93%   BMI 57.41 kg/m²        Gait is Normal. The patient can bear weight on the injured extremity. Gen/Psych:Examination reveals a pleasant individual in no acute distress. The patient is oriented to time, place and person. The patient's mood and affect are appropriate. Patient appears well nourished. Body habitus is morbidly obese     Lymph:  mild lymphedema in bilateral lower extremities     Skin intact in bilateral lower extremities with no ulcerations, lesions, rash, erythema. Vascular: There are mild varicosities in bilateral lower extremities, sensation intact to light touch over bilateral lower extremities. left leg/knee exam:  Leg alignment:     neutral  Quadriceps/hamstring atrophy:   no  Knee effusion:    no   Knee erythema:   no  ROM:     Full, 0-120 degrees  Varus laxity at 0 and 30 deg's: no  Valguslaxity at 0 and 30 deg's: no  Recurvatum:    no  Tenderness at:   Medial joint line      Bilateral Knee strength is 5/5 flexion and extension  There is + L2-S1 motor and sensory function in bilateral lower extremities    Outside record review: office notes and prior x-rays    Imaging studies:  4 views of the left knee taken reviewed in the office today show severe tricompartmental osteoarthritic changes with bone-on-bone articulation the medial compartment. The official read and interpretation of these x-rays will be done by the the [facility]         Impression:     Diagnosis Orders   1.  Primary osteoarthritis of left knee  [place] ARTHROCENTESIS ASPIR&/INJ MAJOR JT/BURSA W/O US           Plan:    Patient Instructions   Steroid injection   Rest the knee for 24-48 hours  Weightbearing and activities as tolerated  May take Ibuprofen or Motrin as needed  Rest, ice, and elevate as needed  Work on ROM and strengthening exercises as discussed  Follow up as needed    Left knee injection procedure note    Pre-procedure diagnosis:  Left knee DJD    Post-procedure diagnosis:  same    Procedure: The planned procedure/risks/benefits/alternatives were discussed with the patient. Risks include, but are not limited to, increased pain, drug reaction, infection, bleeding, lack of improvement, neurovascular injury, and increased blood sugar levels. The patient understood and all of their questions were answered. The Left knee was prepped with alcohol then a 22 gauge needle was advanced into the inferior-lateral joint without difficulty. The joint was then injected with 1 ml 1% lidocaine, 1ml of Kenalog (40 mg/ml), 1ml 0.5% bupivacaine the injection site was cleansed with isopropyl alcohol and a band-aid was placed. Complications:  None, the patient tolerated the procedure well. Instructions: The patient was advised to rest the knee and decrease activity for the next 24 to 48 hours. May use prescription or OTC pain relievers as needed for any post-injection pain as well as ice.

## 2021-10-21 ASSESSMENT — ENCOUNTER SYMPTOMS
EYES NEGATIVE: 1
GASTROINTESTINAL NEGATIVE: 1
RESPIRATORY NEGATIVE: 1

## 2022-02-17 ENCOUNTER — OFFICE VISIT (OUTPATIENT)
Dept: ORTHOPEDIC SURGERY | Age: 52
End: 2022-02-17
Payer: COMMERCIAL

## 2022-02-17 VITALS
HEIGHT: 65 IN | RESPIRATION RATE: 16 BRPM | OXYGEN SATURATION: 97 % | WEIGHT: 293 LBS | BODY MASS INDEX: 48.82 KG/M2 | HEART RATE: 87 BPM

## 2022-02-17 DIAGNOSIS — M17.11 PRIMARY OSTEOARTHRITIS OF RIGHT KNEE: ICD-10-CM

## 2022-02-17 DIAGNOSIS — M17.12 PRIMARY OSTEOARTHRITIS OF LEFT KNEE: Primary | ICD-10-CM

## 2022-02-17 PROCEDURE — G8427 DOCREV CUR MEDS BY ELIG CLIN: HCPCS | Performed by: PHYSICIAN ASSISTANT

## 2022-02-17 PROCEDURE — G8417 CALC BMI ABV UP PARAM F/U: HCPCS | Performed by: PHYSICIAN ASSISTANT

## 2022-02-17 PROCEDURE — 99213 OFFICE O/P EST LOW 20 MIN: CPT | Performed by: PHYSICIAN ASSISTANT

## 2022-02-17 PROCEDURE — 20610 DRAIN/INJ JOINT/BURSA W/O US: CPT | Performed by: PHYSICIAN ASSISTANT

## 2022-02-17 PROCEDURE — 3017F COLORECTAL CA SCREEN DOC REV: CPT | Performed by: PHYSICIAN ASSISTANT

## 2022-02-17 PROCEDURE — 1036F TOBACCO NON-USER: CPT | Performed by: PHYSICIAN ASSISTANT

## 2022-02-17 PROCEDURE — G8484 FLU IMMUNIZE NO ADMIN: HCPCS | Performed by: PHYSICIAN ASSISTANT

## 2022-02-17 NOTE — PATIENT INSTRUCTIONS
Continue to weight bear as tolerated  Continue range of motion   Ice and elevate as needed  Tylenol or motrin for pain   Injection given today in office   Rest for 24-48 hours  Follow up as needed

## 2022-02-21 ASSESSMENT — ENCOUNTER SYMPTOMS
GASTROINTESTINAL NEGATIVE: 1
RESPIRATORY NEGATIVE: 1
EYES NEGATIVE: 1

## 2022-02-21 NOTE — PROGRESS NOTES
Review of Systems   Constitutional: Negative. HENT: Negative. Eyes: Negative. Respiratory: Negative. Cardiovascular: Negative. Gastrointestinal: Negative. Genitourinary: Negative. Musculoskeletal: Positive for arthralgias and myalgias. Skin: Negative. Negative for rash and wound. Neurological: Negative. Psychiatric/Behavioral: Negative. HPI:  Mark Herman is a 46 y.o. female that presents the office today complaining of bilateral knee pain. She did have a steroid injection in her left knee in October 2021. She would like a steroid injections in both knees today. She states her knee pain is aching and throbbing in nature and rates it at a 6/10. Pain is worse with prolonged weightbearing and stairs. Past Medical History:   Diagnosis Date    Arthritis     Cancer [facility])     [facility] treatment Ovarian and endometrial CA    Diabetes ([facility] [address]     H/O cardiovascular stress test 3/17/2014    EF58% normal study    H/O echocardiogram 03/17/2014    EF 55%, normal LV systolic function, mildly dilated left atrium, normal sized right ventricle, normal valves, mild mitral and tricuspid regurg.        Past Surgical History:   Procedure Laterality Date    COLONOSCOPY N/A 1/9/2020    COLONOSCOPY WITH BIOPSY performed by Colby Solorio MD at [address] [place] [address]      12/22/2017    TONSILLECTOMY      UPPER GASTROINTESTINAL ENDOSCOPY N/A 1/9/2020    EGD BIOPSY performed by Cloby Solorio MD at [facility] ENDOSCOPY       Family History   Problem Relation Age of Onset    Diabetes Mother     Heart Disease Father     Diabetes Father     Hypertension Father     Cancer Father     Diabetes Sister     Cancer Maternal Grandmother     Diabetes Paternal Grandmother     Heart Attack Paternal Grandfather        Social History     Socioeconomic History    Marital status: Single     Spouse name: None    Number of children: None    Years of education: None    Highest education level: None   Occupational History    None   Tobacco Use    Smoking status: Passive Smoke Exposure - Never Smoker    Smokeless tobacco: Never Used   Vaping Use    Vaping Use: Never used   Substance and Sexual Activity    Alcohol use: No    Drug use: No    Sexual activity: None     Comment: single   Other Topics Concern    None   Social History Narrative    None     Social Determinants of Health     Financial Resource Strain:     Difficulty of Paying Living Expenses: Not on file   Food Insecurity:     Worried About Running Out of Food in the Last Year: Not on file    Jennifer of Food in the Last Year: Not on file   Transportation Needs:     Lack of Transportation (Medical): Not on file    Lack of Transportation (Non-Medical):  Not on file   Physical Activity:     Days of Exercise per Week: Not on file    Minutes of Exercise per Session: Not on file   Stress:     Feeling of Stress : Not on file   Social Connections:     Frequency of Communication with Friends and Family: Not on file    Frequency of Social Gatherings with Friends and Family: Not on file    Attends Pentecostal Services: Not on file    Active Member of [address] [place] [address] or Organizations: Not on file    Attends Club or Organization Meetings: Not on file    Marital Status: Not on file   Intimate Partner Violence:     Fear of Current or Ex-Partner: Not on file    Emotionally Abused: Not on file    Physically Abused: Not on file    Sexually Abused: Not on file   Housing Stability:     Unable to Pay for Housing in the Last Year: Not on file    Number of [place] in the Last Year: Not on file    Unstable Housing in the Last Year: Not on file       Current Outpatient Medications   Medication Sig Dispense Refill    glyBURIDE (DIABETA) 5 MG tablet 2 times daily (with meals)       metFORMIN ER (GLUCOPHAGE-XR) 500 MG XR tablet 2 times daily       SITagliptin (JANUVIA) 50 MG tablet Take 50 mg by mouth daily (Patient not taking: Reported on 2/17/2022)      pantoprazole (PROTONIX) 40 MG injection Infuse 40 mg intravenously daily (Patient not taking: Reported on 10/18/2021)      zolpidem (AMBIEN) 10 MG tablet Take 10 mg by mouth nightly as needed. (Patient not taking: Reported on 10/18/2021)      ibuprofen (ADVIL) 200 MG CAPS Take 1 capsule by mouth as needed  (Patient not taking: Reported on 10/18/2021)       No current facility-administered medications for this visit. No Known Allergies    Review of Systems:  See above      Physical Exam:   Pulse 87   Resp 16   Ht 5' 5\" (1.651 m)   Wt (!) 345 lb (156.5 kg)   SpO2 97%   BMI 57.41 kg/m²        Gait is Antalgic. The patient can bear weight on the injured extremity. Gen/Psych:Examination reveals a pleasant individual in no acute distress. The patient is oriented to time, place and person. The patient's mood and affect are appropriate. Patient appears well nourished. Body habitus is morbidly obese     Lymph:  No lymphedema in bilateral lower extremities     Skin intact in bilateral lower extremities with no ulcerations, lesions, rash, erythema. Vascular: There are no varicosities in bilateral lower extremities, sensation intact to light touch over bilateral lower extremities. bilateral leg/knee exam:  Leg alignment:     neutral  Quadriceps/hamstring atrophy:   no  Knee effusion:    no   Knee erythema:   no  ROM:     Full, 0-120 degrees  Varus laxity at 0 and 30 deg's: no  Valguslaxity at 0 and 30 deg's: no  Recurvatum:    no  Tenderness at:   Medial and lateral joint line tenderness    Bilateral Knee strength is 5/5 flexion and extension  There is + L2-S1 motor and sensory function in bilateral lower extremities    Outside record review: office notes and x-rays reviewed      Impression:     Diagnosis Orders   1. Primary osteoarthritis of left knee  [place] ARTHROCENTESIS ASPIR&/INJ MAJOR JT/BURSA W/O US   2.  Primary osteoarthritis of right knee  [place] ARTHROCENTESIS ASPIR&/INJ MAJOR tolerated the procedure well. Instructions: The patient was advised to rest the knee and decrease activity for the next 24 to 48 hours. May use prescription or OTC pain relievers as needed for any post-injection pain as well as ice.

## 2022-04-13 ENCOUNTER — HOSPITAL ENCOUNTER (OUTPATIENT)
Dept: WOMENS IMAGING | Age: 52
Discharge: HOME | End: 2022-04-13
Payer: COMMERCIAL

## 2022-04-13 DIAGNOSIS — Z12.31 ENCOUNTER FOR SCREENING MAMMOGRAM FOR MALIGNANT NEOPLASM OF BREAST: ICD-10-CM

## 2022-04-13 PROCEDURE — 77067 SCR MAMMO BI INCL CAD: CPT

## 2022-07-19 PROBLEM — E66.3 BMI OVER RECOMMENDED: Status: ACTIVE | Noted: 2018-02-01

## 2022-09-15 ENCOUNTER — OFFICE VISIT (OUTPATIENT)
Dept: ORTHOPEDIC SURGERY | Age: 52
End: 2022-09-15
Payer: COMMERCIAL

## 2022-09-15 ENCOUNTER — OFFICE (OUTPATIENT)
Dept: URBAN - METROPOLITAN AREA CLINIC 18 | Facility: CLINIC | Age: 52
End: 2022-09-15

## 2022-09-15 VITALS
BODY MASS INDEX: 48.82 KG/M2 | OXYGEN SATURATION: 98 % | DIASTOLIC BLOOD PRESSURE: 76 MMHG | RESPIRATION RATE: 16 BRPM | HEIGHT: 65 IN | SYSTOLIC BLOOD PRESSURE: 132 MMHG | HEART RATE: 76 BPM | WEIGHT: 293 LBS

## 2022-09-15 VITALS — DIASTOLIC BLOOD PRESSURE: 86 MMHG | SYSTOLIC BLOOD PRESSURE: 132 MMHG | WEIGHT: 293 LBS | HEIGHT: 64 IN

## 2022-09-15 DIAGNOSIS — R19.7 DIARRHEA, UNSPECIFIED: ICD-10-CM

## 2022-09-15 DIAGNOSIS — R20.2 PARESTHESIA OF LEFT UPPER EXTREMITY: Primary | ICD-10-CM

## 2022-09-15 DIAGNOSIS — M17.12 PRIMARY OSTEOARTHRITIS OF LEFT KNEE: ICD-10-CM

## 2022-09-15 DIAGNOSIS — R14.0 ABDOMINAL DISTENSION (GASEOUS): ICD-10-CM

## 2022-09-15 PROCEDURE — 1036F TOBACCO NON-USER: CPT | Performed by: PHYSICIAN ASSISTANT

## 2022-09-15 PROCEDURE — 99213 OFFICE O/P EST LOW 20 MIN: CPT | Performed by: PHYSICIAN ASSISTANT

## 2022-09-15 PROCEDURE — G8417 CALC BMI ABV UP PARAM F/U: HCPCS | Performed by: PHYSICIAN ASSISTANT

## 2022-09-15 PROCEDURE — 3017F COLORECTAL CA SCREEN DOC REV: CPT | Performed by: PHYSICIAN ASSISTANT

## 2022-09-15 PROCEDURE — G8427 DOCREV CUR MEDS BY ELIG CLIN: HCPCS | Performed by: PHYSICIAN ASSISTANT

## 2022-09-15 PROCEDURE — 99204 OFFICE O/P NEW MOD 45 MIN: CPT | Performed by: INTERNAL MEDICINE

## 2022-09-15 RX ORDER — DULAGLUTIDE 0.75 MG/.5ML
INJECTION, SOLUTION SUBCUTANEOUS
COMMUNITY
Start: 2022-09-03

## 2022-09-15 RX ORDER — ATORVASTATIN CALCIUM 20 MG/1
20 TABLET, FILM COATED ORAL DAILY
COMMUNITY
Start: 2022-02-23 | End: 2023-02-23

## 2022-09-15 ASSESSMENT — ENCOUNTER SYMPTOMS
RESPIRATORY NEGATIVE: 1
EYES NEGATIVE: 1
GASTROINTESTINAL NEGATIVE: 1

## 2022-09-15 NOTE — PROGRESS NOTES
Review of Systems   Constitutional: Negative. HENT: Negative. Eyes: Negative. Respiratory: Negative. Cardiovascular: Negative. Gastrointestinal: Negative. Genitourinary: Negative. Musculoskeletal:  Positive for arthralgias and myalgias. Skin: Negative. Negative for rash and wound. Neurological: Negative. Psychiatric/Behavioral: Negative. HPI:  Keven Rodrigues is a 46 y.o. female that returns the office today with persistent left knee pain after steroid injection in the knee in February did not really do much for her left knee but it did help her right knee. She is also complaining of left hand/wrist pain extending up into the fingers. She did not injure this area and she is not sure if there is a nerve issue such as carpal tunnel  On or what. She does have history of ovarian and endometrial cancer about 4 years ago which was successfully treated and is now just being monitored. She has not had any surgeries on the left hand or the left knee. She is inquiring today about needing left knee replacement. Past Medical History:   Diagnosis Date    Arthritis     Cancer [facility])     [facility] treatment Ovarian and endometrial CA    Diabetes ([facility] [address]     Endometrial cancer ([facility] [address]     H/O cardiovascular stress test 3/17/2014    EF58% normal study    H/O echocardiogram 03/17/2014    EF 55%, normal LV systolic function, mildly dilated left atrium, normal sized right ventricle, normal valves, mild mitral and tricuspid regurg.     Ovarian cancer [facility])        Past Surgical History:   Procedure Laterality Date    COLONOSCOPY N/A 1/9/2020    COLONOSCOPY WITH BIOPSY performed by Sadie Kincaid MD at [facility]      12/22/2017    OVARY REMOVAL      TONSILLECTOMY      UPPER GASTROINTESTINAL ENDOSCOPY N/A 1/9/2020    EGD BIOPSY performed by Sadie Kincaid MD at [facility] ENDOSCOPY       Family History   Problem Relation Age of Onset    Diabetes Mother person. The patient's mood and affect are appropriate. Patient appears well nourished. Body habitus is morbidly obese     Lymph:  No lymphedema in bilateral lower extremities     Skin intact in bilateral lower extremities with no ulcerations, lesions, rash, erythema. Vascular: There are no varicosities in bilateral lower extremities, sensation intact to light touch over bilateral lower extremities. left leg/knee exam:  Leg alignment:     neutral  Quadriceps/hamstring atrophy:   no  Knee effusion:    no   Knee erythema:   no  ROM:     Full, 0-120 degrees  Varus laxity at 0 and 30 deg's: no  Valguslaxity at 0 and 30 deg's: no  Recurvatum:    no  Tenderness at:   Medial and lateral joint line tenderness    Bilateral Knee strength is 5/5 flexion and extension  There is + L2-S1 motor and sensory function in bilateral lower extremities    Outside record review: office notes and x-rays reviewed  Patient has bone-on-bone arthritis of the left knee. Hemoglobin A1c of 7/2022 was 6.4    Imaging studies:  3 views of the left wrist taken and reviewed in the office today show no acute fractures, no dislocations, no significant degenerative changes noted. The official read and interpretation of these x-rays will be done by the the [facility]       Impression:     Diagnosis Orders   1. Paresthesia of left upper extremity  Machelle Rees MD, Physical Medicine, [place]      2. Primary osteoarthritis of left knee              Plan:      Follow-up with Dr. Jet Candelaria to discuss possible left total knee replacement. EMG left upper extremity to evaluate for possible atypical carpal tunnel presentation.

## 2022-09-16 LAB
CBC, PLATELET CT  AND  DIFF: ABS BASOPHIL: 0.1 K/UL
CBC, PLATELET CT  AND  DIFF: ABS EOSINOPHIL: 0.1 K/UL
CBC, PLATELET CT  AND  DIFF: ABS IMMATURE GRANS: 0 K/UL
CBC, PLATELET CT  AND  DIFF: ABS LYMPHOCYTE: 0.9 K/UL
CBC, PLATELET CT  AND  DIFF: ABS MONOCYTE: 0.3 K/UL
CBC, PLATELET CT  AND  DIFF: ABS NEUTROPHIL: 3.8 K/UL
CBC, PLATELET CT  AND  DIFF: BASOPHIL: 1.1 %
CBC, PLATELET CT  AND  DIFF: DIFFERENTIAL: (no result)
CBC, PLATELET CT  AND  DIFF: EOSINOPHIL: 2.3 %
CBC, PLATELET CT  AND  DIFF: HEMATOCRIT: 36.3 %
CBC, PLATELET CT  AND  DIFF: HEMOGLOBIN: 12.4 G/DL
CBC, PLATELET CT  AND  DIFF: IMMATURE GRANULOCYTES: 0.6 %
CBC, PLATELET CT  AND  DIFF: LYMPHOCYTE: 17 %
CBC, PLATELET CT  AND  DIFF: MCH: 28.8 PG
CBC, PLATELET CT  AND  DIFF: MCHC: 34.2 G/DL
CBC, PLATELET CT  AND  DIFF: MCV: 84.4 FL
CBC, PLATELET CT  AND  DIFF: MONOCYTE: 5.9 %
CBC, PLATELET CT  AND  DIFF: MPV: 9.5 FL
CBC, PLATELET CT  AND  DIFF: NEUTROPHIL: 73.1 %
CBC, PLATELET CT  AND  DIFF: NRBCS: 0 /100 WBC
CBC, PLATELET CT  AND  DIFF: PLATELET COUNT: 263 K/UL
CBC, PLATELET CT  AND  DIFF: RBC: 4.3 M/UL
CBC, PLATELET CT  AND  DIFF: RDW: 13.5 %
CBC, PLATELET CT  AND  DIFF: WBC COUNT: 5.2 K/UL
COMPREHENSIVE METABOLIC PANEL: A/G RATIO: 1.9 RATIO
COMPREHENSIVE METABOLIC PANEL: ALBUMIN: 4.2 G/DL
COMPREHENSIVE METABOLIC PANEL: ALK PHOSPHATASE: 126 U/L
COMPREHENSIVE METABOLIC PANEL: ALT: 21 U/L
COMPREHENSIVE METABOLIC PANEL: AST: 17 U/L
COMPREHENSIVE METABOLIC PANEL: BILIRUBIN,TOTAL: 0.3 MG/DL
COMPREHENSIVE METABOLIC PANEL: BLOOD UREA NITROGEN: 16 MG/DL
COMPREHENSIVE METABOLIC PANEL: BUN/CREAT RATIO: 16
COMPREHENSIVE METABOLIC PANEL: CALCIUM: 10.2 MG/DL
COMPREHENSIVE METABOLIC PANEL: CHLORIDE: 103 MEQ/L
COMPREHENSIVE METABOLIC PANEL: CO2: 26 MEQ/L
COMPREHENSIVE METABOLIC PANEL: CREATININE: 1 MG/DL
COMPREHENSIVE METABOLIC PANEL: FASTING STATUS: (no result)
COMPREHENSIVE METABOLIC PANEL: GLOBULIN: 2.2 G/DL
COMPREHENSIVE METABOLIC PANEL: GLOMERULAR FILTRATION RATE (GFR): 68 MLS/MIN/1.73M2
COMPREHENSIVE METABOLIC PANEL: GLUCOSE,RANDOM: 148 MG/DL — HIGH
COMPREHENSIVE METABOLIC PANEL: POTASSIUM: 4.4 MEQ/L
COMPREHENSIVE METABOLIC PANEL: SODIUM: 142 MEQ/L
COMPREHENSIVE METABOLIC PANEL: TOTAL PROTEIN: 6.4 G/DL
IGA: 185 MG/DL
TISSUE TRANSGLUTAMINASE ANTIBODY, IGA: TISSUE TRANSGLUTAMINASE AB, IGA: <4

## 2022-09-25 LAB
C DIFF SCREEN: C DIFFICILE SCREEN: NEGATIVE
CALPROTECTIN, STOOL: 362 MCG/G — HIGH
CULTURE, STOOL: (no result)
CULTURE, STOOL: REPORT DATE: (no result)
O AND P  ROUTINE: CRYPTOSPORIDIUM AG EIA: NEGATIVE
O AND P  ROUTINE: GIARDIA DUODENALIS AG: NEGATIVE
PANCREATIC ELASTASE-1: >500 MCG/G
REPORT COMMENT: (no result)

## 2022-10-07 ENCOUNTER — OFFICE VISIT (OUTPATIENT)
Dept: PHYSICAL MEDICINE AND REHAB | Age: 52
End: 2022-10-07
Payer: COMMERCIAL

## 2022-10-07 VITALS — TEMPERATURE: 97 F

## 2022-10-07 DIAGNOSIS — M79.602 PARESTHESIA AND PAIN OF BOTH UPPER EXTREMITIES: ICD-10-CM

## 2022-10-07 DIAGNOSIS — R20.2 PARESTHESIA AND PAIN OF BOTH UPPER EXTREMITIES: ICD-10-CM

## 2022-10-07 DIAGNOSIS — G56.03 BILATERAL CARPAL TUNNEL SYNDROME: Primary | ICD-10-CM

## 2022-10-07 DIAGNOSIS — M79.601 PARESTHESIA AND PAIN OF BOTH UPPER EXTREMITIES: ICD-10-CM

## 2022-10-07 PROCEDURE — 95911 NRV CNDJ TEST 9-10 STUDIES: CPT | Performed by: PHYSICAL MEDICINE & REHABILITATION

## 2022-10-07 PROCEDURE — 95885 MUSC TST DONE W/NERV TST LIM: CPT | Performed by: PHYSICAL MEDICINE & REHABILITATION

## 2022-10-07 NOTE — PROGRESS NOTES
EMG REPORT     CHIEF COMPLAINT: Intermittent sharp pains in her left wrist and hand. HISTORY OF PRESENT ILLNESS: 46 y.o. right hand dominant female with intermittent, but progressive pain and stiffness in her left wrist and hand. She reports getting some tingling sensation in her pinky finger and thumb at times. She feels the symptoms whether she is actively using the hand or resting. Her sleep is not impacted, however. She also reported not dropping things routinely. She does not sense any neck pain radiating into her upper limbs. She did not report any rash or limb discoloration. She feels the pain severity can be 10/10 at times. The back of her hand can throb during an episode. She has a history of diabetes. No thyroid disorder. PHYSICAL EXAMINATION: Alert. About 70 degrees of active cervical spine rotation to the left and 60 degrees to the right. Spurling's maneuver was negative. Upper limb reflexes were trace and symmetric. Tinel's sign is negative. The patient struggled to relax during a physical exam and the EMG but there was no focal weakness, tremor or clonus noted. Sensation seemed intact confrontation. NERVE CONDUCTION STUDIES:     MOTOR         LATENCY NORMAL AMPLITUDE DISTANCE COND. NOEL. RIGHT  MEDIAN 4.8 < 4.2 msec 2.7 8 cm >50   LEFT  MEDIAN 4.0 < 4.2 msec 6.2 8 cm 57   RIGHT  ULNAR 3.0 < 4.2 msec 7.7 8 cm >50   LEFT  ULNAR 3.2 < 4.2 msec 6.5/7.4 8 cm 54/>70      SENSORY  ORTHODROMIC        LATENCY NORMAL AMPLITUDE DISTANCE   RIGHT MEDIAN 3.0 <2.3 msec 23 10 cm   LEFT  MEDIAN 2.6 < 2.3 msec 26 10 cm   RIGHT  ULNAR 2.2 < 2.3 msec 15 10 cm   LEFT  ULNAR 2.2 < 2.3 msec 8 10 cm       Left dorsal ulnar sensory: dL 2.4 msec, amp 12 microV.         NEEDLE EMG:      RIGHT   LEFT     Insertional Activity Spontaneous  Activity Volutional  MUAP's Insertional Activity Spontaneous  Activity Volutional  MUAP's   Cerv Parasp    Normal None Normal   Infraspinatus    Normal None Normal Deltoid      Normal None Normal   Biceps      Normal None Normal   Triceps      Normal None Normal   Pronator Teres    Normal None Normal   Extensor Indicis    Normal None Normal   1st Dorsal Interosseus    Normal None Normal   Abductor Pollicis Br. Normal None Normal       FINDINGS:   EMG of the cervical paraspinals and the left upper limb demonstrated no paraspinal nor limb muscle membrane irritability. Motor units were of normal configuration and. Median sensory distal latencies were just outside of normal limits bilaterally. The right median motor distal latency was significantly delayed and the evoked amplitude was diminished. Ulnar nerve conductions were well-maintained. IMPRESSION:      1. Mildly abnormal EMG. 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 2.  No evidence of a concurrent cervical spinal nerve root injury (radiculopathy), plexopathy, generalized neuropathy or primary muscle disease.        Thank you for this interesting referral.

## 2022-10-14 ENCOUNTER — ON CAMPUS - OUTPATIENT (OUTPATIENT)
Dept: URBAN - METROPOLITAN AREA HOSPITAL 105 | Facility: HOSPITAL | Age: 52
End: 2022-10-14

## 2022-10-14 DIAGNOSIS — R12 HEARTBURN: ICD-10-CM

## 2022-10-14 DIAGNOSIS — Z53.8 PROCEDURE AND TREATMENT NOT CARRIED OUT FOR OTHER REASONS: ICD-10-CM

## 2022-10-14 DIAGNOSIS — K29.50 UNSPECIFIED CHRONIC GASTRITIS WITHOUT BLEEDING: ICD-10-CM

## 2022-10-14 DIAGNOSIS — R19.7 DIARRHEA, UNSPECIFIED: ICD-10-CM

## 2022-10-14 PROCEDURE — 45378 DIAGNOSTIC COLONOSCOPY: CPT | Mod: 53 | Performed by: INTERNAL MEDICINE

## 2022-10-14 PROCEDURE — 43239 EGD BIOPSY SINGLE/MULTIPLE: CPT | Performed by: INTERNAL MEDICINE

## 2022-10-24 ENCOUNTER — OFFICE VISIT (OUTPATIENT)
Dept: ORTHOPEDIC SURGERY | Age: 52
End: 2022-10-24
Payer: COMMERCIAL

## 2022-10-24 VITALS
WEIGHT: 293 LBS | BODY MASS INDEX: 48.82 KG/M2 | RESPIRATION RATE: 15 BRPM | DIASTOLIC BLOOD PRESSURE: 90 MMHG | SYSTOLIC BLOOD PRESSURE: 138 MMHG | HEIGHT: 65 IN

## 2022-10-24 DIAGNOSIS — M17.12 PRIMARY OSTEOARTHRITIS OF LEFT KNEE: Primary | ICD-10-CM

## 2022-10-24 PROCEDURE — G8484 FLU IMMUNIZE NO ADMIN: HCPCS | Performed by: ORTHOPAEDIC SURGERY

## 2022-10-24 PROCEDURE — G8417 CALC BMI ABV UP PARAM F/U: HCPCS | Performed by: ORTHOPAEDIC SURGERY

## 2022-10-24 PROCEDURE — 3017F COLORECTAL CA SCREEN DOC REV: CPT | Performed by: ORTHOPAEDIC SURGERY

## 2022-10-24 PROCEDURE — 99203 OFFICE O/P NEW LOW 30 MIN: CPT | Performed by: ORTHOPAEDIC SURGERY

## 2022-10-24 PROCEDURE — 1036F TOBACCO NON-USER: CPT | Performed by: ORTHOPAEDIC SURGERY

## 2022-10-24 PROCEDURE — G8427 DOCREV CUR MEDS BY ELIG CLIN: HCPCS | Performed by: ORTHOPAEDIC SURGERY

## 2022-10-24 ASSESSMENT — ENCOUNTER SYMPTOMS
COLOR CHANGE: 0
ABDOMINAL PAIN: 0
CHEST TIGHTNESS: 0
BACK PAIN: 0
EYE REDNESS: 0

## 2022-10-24 NOTE — PROGRESS NOTES
Subjective:      Patient ID: Mery Tejada is a 46 y.o. female. Patient presents to the office today for evaluation of the left knee. Patient states pain has been ongoing for several years now. Pt states pain is worse with prolong walking or standing. Pt states she was getting steroid injection by Alicia CRAIG last injection given on 2/17/22. Pt states the injection does not help with her pain. She comes in today for her first visit in the regards to her left knee pain. She states that she has been dealing with deep, aching and grinding pain globally in her left knee for several years. She states that now the pain is gotten to the point that she is having difficulty walking or performing her daily activities. She has been continuing to attempt to work on weight loss but she is having difficulty because of the pain. She is very active and visiting and touring historical war sites and she is having difficulty with trips to [place] because of the pain. Review of Systems   Constitutional:  Negative for activity change, chills and fever. HENT:  Negative for congestion and drooling. Eyes:  Negative for redness. Respiratory:  Negative for chest tightness. Cardiovascular:  Negative for chest pain. Gastrointestinal:  Negative for abdominal pain. Endocrine: Negative for cold intolerance and heat intolerance. Musculoskeletal:  Positive for arthralgias, gait problem, joint swelling and myalgias. Negative for back pain. Skin:  Negative for color change, pallor, rash and wound. Neurological:  Negative for weakness and numbness. Psychiatric/Behavioral:  Negative for confusion.       Past Medical History:   Diagnosis Date    Arthritis     Cancer [facility] treatment Ovarian and endometrial CA    [facility] [address]     [facility]ometrial cancer [facility] [address]     H/O cardiovascular stress test 3/17/2014    EF58% normal study    H/O echocardiogram 03/17/2014    EF 55%, normal LV systolic function, mildly dilated left atrium, normal sized right ventricle, normal valves, mild mitral and tricuspid regurg.  Ovarian cancer ([address]        Objective:   Physical Exam  Constitutional:       Appearance: She is well-developed. HENT:      Head: Normocephalic. Nose: No congestion. Eyes:      Pupils: Pupils are equal, round, and reactive to light. Pulmonary:      Effort: Pulmonary effort is normal.   Musculoskeletal:         General: Swelling and tenderness present. No deformity. Normal range of motion. Cervical back: Normal range of motion. Right hip: Normal.      Left hip: Normal.      Right knee: No swelling, deformity, effusion, erythema, ecchymosis, lacerations or bony tenderness. Normal range of motion. No tenderness. No medial joint line, lateral joint line, MCL, LCL or patellar tendon tenderness. No LCL laxity or MCL laxity. Normal alignment and normal patellar mobility. Left knee: Swelling, bony tenderness and crepitus present. No deformity, effusion, erythema, ecchymosis or lacerations. Normal range of motion. Tenderness present over the medial joint line, lateral joint line and patellar tendon. No MCL or LCL tenderness. No LCL laxity or MCL laxity. Normal alignment and normal patellar mobility. Skin:     General: Skin is warm and dry. Capillary Refill: Capillary refill takes less than 2 seconds. Coloration: Skin is not pale. Findings: No erythema or rash. Neurological:      Mental Status: She is alert and oriented to person, place, and time. Sensory: No sensory deficit. Motor: No weakness. Left knee-Skin intact with no erythema, ecchymosis or lacerations present.   0-120    XR KNEE LEFT (3 VIEWS)    Result Date: 10/24/2022  XRAY X-ray 3 views of the left knee obtained and reviewed by me today in the office demonstrates age appropriate bone density throughout with severe degenerative changes with medial patellofemoral joint space collapse, moderate osteophyte formation all 3 compartments with mild osteophyte formation of the posterior compartment, normal tracking patella, no acute osseous abnormalities. Impression: Severe degenerative changes of left knee as above with no acute process. Assessment:      Left knee OA, severe      Plan:      I discussed with her today her x-ray findings. I explained to her that she does have severe arthritis in the left knee. At this point given her persistent worsening symptoms despite conservative treatment and with her x-ray findings I recommend surgical treatment. I had a lengthy discussion with her today in regards to her left total knee replacement surgery. I explained risks, benefits, possible complications of the procedure and answered all questions for the patient. I explained postoperative rehabilitation protocol and expectations with the patient today. The patient understands and consents to the procedure. Patient will follow up with their primary care physician prior to surgical treatment for preoperative clearance. We will schedule surgery at soonest convenience. Continue weight-bearing as tolerated. Continue range of motion exercises as instructed. Ice and elevate as needed. Tylenol or Motrin for pain. Follow up in 3 weeks postop. She would like to schedule her surgery for January. She would like to have her surgery at [place] and would like to do this as an outpatient and she would likely be a press-fit candidate.           Edy Charlton, DO

## 2022-10-24 NOTE — PATIENT INSTRUCTIONS
Continue weight-bearing as tolerated. Continue range of motion exercises as instructed. Ice and elevate as needed. Tylenol or Motrin for pain. We will schedule surgery at soonest convenience.  If you have any questions regarding your surgery please call our office and ask to speak with Haim Verdugo [phone]

## 2022-10-24 NOTE — PROGRESS NOTES
Patient presents to the office today for evaluation of the left knee. Patient states pain has been ongoing for several years now. Pt states pain is worse with prolong walking or standing. Pt states she was getting steroid injection by Vida CRAIG last injection given on 2/17/22. Pt states the injection does not help with her pain.

## 2022-10-26 ENCOUNTER — TELEPHONE (OUTPATIENT)
Dept: CARDIOLOGY CLINIC | Age: 52
End: 2022-10-26

## 2022-10-26 NOTE — TELEPHONE ENCOUNTER
Called left a message to call and set up New Patient appt for cardiac clearance Carozza knee surgery

## 2022-10-27 ENCOUNTER — TELEPHONE (OUTPATIENT)
Dept: CARDIOLOGY CLINIC | Age: 52
End: 2022-10-27

## 2022-10-27 NOTE — TELEPHONE ENCOUNTER
Cardiologist: Dr. Graciela Hwang  Surgeon: Dr. Gideon Canavan  Surgery: Left total knee arthroplasty  Anesthesia: Spinal  Date: 1/5/2023  FAX# [phone]  Ph# [phone]        New Patient - 11/29/2022   [place]

## 2022-10-28 RX ORDER — SODIUM CHLORIDE, SODIUM LACTATE, POTASSIUM CHLORIDE, CALCIUM CHLORIDE 600; 310; 30; 20 MG/100ML; MG/100ML; MG/100ML; MG/100ML
INJECTION, SOLUTION INTRAVENOUS CONTINUOUS
OUTPATIENT
Start: 2023-01-05

## 2022-10-31 ENCOUNTER — TELEPHONE (OUTPATIENT)
Dept: ORTHOPEDIC SURGERY | Age: 52
End: 2022-10-31

## 2022-10-31 NOTE — TELEPHONE ENCOUNTER
Patient asking if we have started her STD paperwork yet. 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     Please advise pt when finished   XP#[phone]

## 2022-11-08 NOTE — TELEPHONE ENCOUNTER
Forms faxed 11/7/22. I called pt and she confirmed that the forms had been received successfully for her TROY.

## 2022-11-18 ENCOUNTER — TELEPHONE (OUTPATIENT)
Dept: ORTHOPEDIC SURGERY | Age: 52
End: 2022-11-18

## 2022-11-18 DIAGNOSIS — M25.562 CHRONIC PAIN OF LEFT KNEE: ICD-10-CM

## 2022-11-18 DIAGNOSIS — M17.12 PRIMARY OSTEOARTHRITIS OF LEFT KNEE: Primary | ICD-10-CM

## 2022-11-18 DIAGNOSIS — G89.29 CHRONIC PAIN OF LEFT KNEE: ICD-10-CM

## 2022-11-28 NOTE — PROGRESS NOTES
Left VM with call back number reminding patient of pre-testing appointment 11/29/2022, also please call PAT nurse for phone assessment

## 2022-11-28 NOTE — PROGRESS NOTES
.Surgery @ [facility] on 1/5/23 you will be called 1/4/23 with times               1. Do not eat or drink anything after midnight - unless instructed by your doctor prior to surgery. This includes                   no water, chewing gum or mints. 2. Follow your directions as prescribed by the doctor for your procedure and medications. Take Metformin, Protonix with sips of water    3. Check with your Doctor regarding stopping vitamins, supplements, blood thinners and follow their instructions. Stop vitamins, supplements and NSAIDS: 12/30/22   4. Do not smoke, vape or use chewing tobacco morning of surgery. Do not drink any alcoholic beverages 24 hours prior to surgery. This includes NA Beer. No street drugs 7 days prior to surgery. 5. You may brush your teeth and gargle the morning of surgery. DO NOT SWALLOW WATER   6. You MUST make arrangements for a responsible adult to take you home after your surgery and be able to check on you every couple                   hours for the day. You will not be allowed to leave alone or drive yourself home. It is strongly suggested someone stay with you the first 24                   hrs. Your surgery will be cancelled if you do not have a ride home. 7. Please wear simple, loose fitting clothing to the hospital.  Amari Cuevas not bring valuables (money, credit cards, checkbooks, etc.) Do not wear any                   makeup (including no eye makeup) or nail polish on your fingers or toes. 8. DO NOT wear any jewelry or piercings on day of surgery. All body piercing jewelry must be removed. 9. If you have dentures, they will be removed before going to the OR; we will provide you a container. If you wear contact lenses or glasses,                  they will be removed; please bring a case for them. 10. If you  have a Living Will and Durable Power of  for Healthcare, please bring in a copy.            11. Please bring picture ID, insurance card, paperwork from the doctors office    (H & P, Consent, & card for implantable devices). 12. Take a shower the morning of your procedure with soaps provided. Do not apply any make-up, deodorant, lotion, oil or powder. 15.  Enter thru the main entrance on the day of surgery.

## 2022-11-29 ENCOUNTER — HOSPITAL ENCOUNTER (OUTPATIENT)
Dept: PREADMISSION TESTING | Age: 52
Discharge: HOME | End: 2022-12-03
Payer: COMMERCIAL

## 2022-11-29 ENCOUNTER — HOSPITAL ENCOUNTER (OUTPATIENT)
Age: 52
Discharge: HOME | End: 2022-11-29
Payer: COMMERCIAL

## 2022-11-29 ENCOUNTER — INITIAL CONSULT (OUTPATIENT)
Dept: CARDIOLOGY CLINIC | Age: 52
End: 2022-11-29
Payer: COMMERCIAL

## 2022-11-29 VITALS
OXYGEN SATURATION: 97 % | SYSTOLIC BLOOD PRESSURE: 122 MMHG | DIASTOLIC BLOOD PRESSURE: 78 MMHG | WEIGHT: 293 LBS | BODY MASS INDEX: 50.02 KG/M2 | HEIGHT: 64 IN

## 2022-11-29 VITALS
HEIGHT: 64 IN | SYSTOLIC BLOOD PRESSURE: 132 MMHG | RESPIRATION RATE: 16 BRPM | BODY MASS INDEX: 50.02 KG/M2 | DIASTOLIC BLOOD PRESSURE: 78 MMHG | WEIGHT: 293 LBS | OXYGEN SATURATION: 97 % | TEMPERATURE: 97.1 F | HEART RATE: 100 BPM

## 2022-11-29 DIAGNOSIS — Z01.818 PRE-OP EVALUATION: ICD-10-CM

## 2022-11-29 DIAGNOSIS — R07.2 PRECORDIAL PAIN: ICD-10-CM

## 2022-11-29 DIAGNOSIS — Z82.49 FH: CAD (CORONARY ARTERY DISEASE): ICD-10-CM

## 2022-11-29 DIAGNOSIS — Z01.818 PRE-OP TESTING: Primary | ICD-10-CM

## 2022-11-29 DIAGNOSIS — I10 HYPERTENSION, UNSPECIFIED TYPE: Primary | ICD-10-CM

## 2022-11-29 DIAGNOSIS — F41.9 ANXIETY: ICD-10-CM

## 2022-11-29 DIAGNOSIS — R94.31 ABNORMAL EKG: ICD-10-CM

## 2022-11-29 LAB
ALBUMIN SERPL-MCNC: 4.4 GM/DL (ref 3.4–5)
ALP BLD-CCNC: 128 IU/L (ref 40–129)
ALT SERPL-CCNC: 21 U/L (ref 10–40)
ANION GAP SERPL CALCULATED.3IONS-SCNC: 12 MMOL/L (ref 4–16)
AST SERPL-CCNC: 21 IU/L (ref 15–37)
BASOPHILS ABSOLUTE: 0 K/CU MM
BASOPHILS RELATIVE PERCENT: 0.6 % (ref 0–1)
BILIRUB SERPL-MCNC: 0.5 MG/DL (ref 0–1)
BUN BLDV-MCNC: 15 MG/DL (ref 6–23)
CALCIUM SERPL-MCNC: 9.5 MG/DL (ref 8.3–10.6)
CHLORIDE BLD-SCNC: 105 MMOL/L (ref 99–110)
CO2: 25 MMOL/L (ref 21–32)
CREAT SERPL-MCNC: 0.8 MG/DL (ref 0.6–1.1)
DIFFERENTIAL TYPE: ABNORMAL
EOSINOPHILS ABSOLUTE: 0.1 K/CU MM
EOSINOPHILS RELATIVE PERCENT: 2.7 % (ref 0–3)
ERYTHROCYTE SEDIMENTATION RATE: 31 MM/HR (ref 0–30)
GFR SERPL CREATININE-BSD FRML MDRD: >60 ML/MIN/1.73M2
GLUCOSE BLD-MCNC: 169 MG/DL (ref 70–99)
HCT VFR BLD CALC: 41.3 % (ref 37–47)
HEMOGLOBIN: 13.1 GM/DL (ref 12.5–16)
IMMATURE NEUTROPHIL %: 0.2 % (ref 0–0.43)
LYMPHOCYTES ABSOLUTE: 1 K/CU MM
LYMPHOCYTES RELATIVE PERCENT: 19.4 % (ref 24–44)
MCH RBC QN AUTO: 27.8 PG (ref 27–31)
MCHC RBC AUTO-ENTMCNC: 31.7 % (ref 32–36)
MCV RBC AUTO: 87.7 FL (ref 78–100)
MONOCYTES ABSOLUTE: 0.3 K/CU MM
MONOCYTES RELATIVE PERCENT: 5.6 % (ref 0–4)
NUCLEATED RBC %: 0 %
PDW BLD-RTO: 13.7 % (ref 11.7–14.9)
PLATELET # BLD: 262 K/CU MM (ref 140–440)
PMV BLD AUTO: 9.5 FL (ref 7.5–11.1)
POTASSIUM SERPL-SCNC: 4.4 MMOL/L (ref 3.5–5.1)
RBC # BLD: 4.71 M/CU MM (ref 4.2–5.4)
SEGMENTED NEUTROPHILS ABSOLUTE COUNT: 3.7 K/CU MM
SEGMENTED NEUTROPHILS RELATIVE PERCENT: 71.5 % (ref 36–66)
SODIUM BLD-SCNC: 142 MMOL/L (ref 135–145)
TOTAL IMMATURE NEUTOROPHIL: 0.01 K/CU MM
TOTAL NUCLEATED RBC: 0 K/CU MM
TOTAL PROTEIN: 6.9 GM/DL (ref 6.4–8.2)
WBC # BLD: 5.2 K/CU MM (ref 4–10.5)

## 2022-11-29 PROCEDURE — G8417 CALC BMI ABV UP PARAM F/U: HCPCS | Performed by: INTERNAL MEDICINE

## 2022-11-29 PROCEDURE — 3078F DIAST BP <80 MM HG: CPT | Performed by: INTERNAL MEDICINE

## 2022-11-29 PROCEDURE — 99204 OFFICE O/P NEW MOD 45 MIN: CPT | Performed by: INTERNAL MEDICINE

## 2022-11-29 PROCEDURE — 85652 RBC SED RATE AUTOMATED: CPT

## 2022-11-29 PROCEDURE — 3074F SYST BP LT 130 MM HG: CPT | Performed by: INTERNAL MEDICINE

## 2022-11-29 PROCEDURE — G8484 FLU IMMUNIZE NO ADMIN: HCPCS | Performed by: INTERNAL MEDICINE

## 2022-11-29 PROCEDURE — G8427 DOCREV CUR MEDS BY ELIG CLIN: HCPCS | Performed by: INTERNAL MEDICINE

## 2022-11-29 PROCEDURE — 87186 SC STD MICRODIL/AGAR DIL: CPT

## 2022-11-29 PROCEDURE — 87086 URINE CULTURE/COLONY COUNT: CPT

## 2022-11-29 PROCEDURE — 80053 COMPREHEN METABOLIC PANEL: CPT

## 2022-11-29 PROCEDURE — 93005 ELECTROCARDIOGRAM TRACING: CPT | Performed by: ORTHOPAEDIC SURGERY

## 2022-11-29 PROCEDURE — 93000 ELECTROCARDIOGRAM COMPLETE: CPT | Performed by: INTERNAL MEDICINE

## 2022-11-29 PROCEDURE — 81001 URINALYSIS AUTO W/SCOPE: CPT

## 2022-11-29 PROCEDURE — 36415 COLL VENOUS BLD VENIPUNCTURE: CPT

## 2022-11-29 PROCEDURE — 85025 COMPLETE CBC W/AUTO DIFF WBC: CPT

## 2022-11-29 PROCEDURE — 87088 URINE BACTERIA CULTURE: CPT

## 2022-11-29 ASSESSMENT — PAIN DESCRIPTION - PAIN TYPE: TYPE: ACUTE PAIN

## 2022-11-29 ASSESSMENT — PAIN DESCRIPTION - DESCRIPTORS: DESCRIPTORS: THROBBING

## 2022-11-29 ASSESSMENT — PAIN DESCRIPTION - LOCATION: LOCATION: KNEE

## 2022-11-29 ASSESSMENT — PAIN DESCRIPTION - ONSET: ONSET: ON-GOING

## 2022-11-29 ASSESSMENT — PAIN - FUNCTIONAL ASSESSMENT: PAIN_FUNCTIONAL_ASSESSMENT: PREVENTS OR INTERFERES SOME ACTIVE ACTIVITIES AND ADLS

## 2022-11-29 ASSESSMENT — PAIN SCALES - GENERAL: PAINLEVEL_OUTOF10: 2

## 2022-11-29 ASSESSMENT — PAIN DESCRIPTION - FREQUENCY: FREQUENCY: CONTINUOUS

## 2022-11-29 ASSESSMENT — PAIN DESCRIPTION - ORIENTATION: ORIENTATION: LEFT

## 2022-11-29 NOTE — ASSESSMENT & PLAN NOTE
Blood pressure generally well controlled currently not on any medication she is on Januvia.   Diabetes

## 2022-11-29 NOTE — PROGRESS NOTES
.Surgery @ [facility] on 1/5/23 you will be called 1/4/23 with times               1. Do not eat or drink anything after midnight - unless instructed by your doctor prior to surgery. This includes                   no water, chewing gum or mints. 2. Follow your directions as prescribed by the doctor for your procedure and medications. Take Metformin & Protonix morning of surgery with                  Sips of water. 3. Check with your Doctor regarding stopping vitamins, supplements, blood thinners and follow their instructions. Stop vitamins, supplements and NSAIDS:  12/29/22   4. Do not smoke, vape or use chewing tobacco morning of surgery. Do not drink any alcoholic beverages 24 hours prior to surgery. This includes NA Beer. No street drugs 7 days prior to surgery. 5. You may brush your teeth and gargle the morning of surgery. DO NOT SWALLOW WATER   6. You MUST make arrangements for a responsible adult to take you home after your surgery and be able to check on you every couple                   hours for the day. You will not be allowed to leave alone or drive yourself home. It is strongly suggested someone stay with you the first 24                   hrs. Your surgery will be cancelled if you do not have a ride home. 7. Please wear simple, loose fitting clothing to the hospital.  Laura Mendez not bring valuables (money, credit cards, checkbooks, etc.) Do not wear any                   makeup (including no eye makeup) or nail polish on your fingers or toes. 8. DO NOT wear any jewelry or piercings on day of surgery. All body piercing jewelry must be removed. 9. If you have dentures, they will be removed before going to the OR; we will provide you a container. If you wear contact lenses or glasses,                  they will be removed; please bring a case for them.            10. If you  have a Living Will and Durable Power of [address] [place] [address], please bring in a copy. 11. Please bring picture ID,  insurance card, paperwork from the doctors office    (H & P, Consent, & card for implantable devices). 12. Take a shower the night before AND morning of your procedure with the soaps provided. Do not apply any make-up, deodorant, lotion, oil or powder. 15.  Enter thru the main entrance on the day of surgery.

## 2022-11-29 NOTE — PROGRESS NOTES
CARDIOLOGY  NOTE    Chief Complaint: Preoperative risk assessment    HPI:   Shira Madera is a 46y.o. year old who has Past medical history as noted below. Shira Madera comes in for evaluation before her knee surgery she does report shortness of breath exertion she had a colonoscopy last year and then a hysterectomy in 2017 but no recent cardiac work-up or stress test.  She denies any chest pain but reports shortness of breath with exertion but that could be related to deconditioning related  Dad has heart problems   History of ovarian cancer s/p chemo currently in remission    Current Outpatient Medications   Medication Sig Dispense Refill    TRULICITY 1.50 DV/8.6ZV SOPN       metFORMIN (GLUCOPHAGE) 850 MG tablet 2 times daily      atorvastatin (LIPITOR) 20 MG tablet Take 20 mg by mouth daily      pantoprazole (PROTONIX) 40 MG injection Infuse 40 mg intravenously daily      glyBURIDE (DIABETA) 5 MG tablet 10 mg 2 times daily (with meals)      SITagliptin (JANUVIA) 50 MG tablet Take 50 mg by mouth daily (Patient not taking: No sig reported)      zolpidem (AMBIEN) 10 MG tablet Take 10 mg by mouth nightly as needed. (Patient not taking: No sig reported)       No current facility-administered medications for this visit. Allergies:   Patient has no known allergies. Patient History:  Past Medical History:   Diagnosis Date    Arthritis     Cancer ([facility] [address]     [facility] treatment Ovarian and endometrial CA    Diabetes ([facility] [address]     Endometrial cancer ([facility] [address]     H/O cardiovascular stress test 3/17/2014    EF58% normal study    H/O echocardiogram 03/17/2014    EF 55%, normal LV systolic function, mildly dilated left atrium, normal sized right ventricle, normal valves, mild mitral and tricuspid regurg.     Ovarian cancer [facility])      Past Surgical History:   Procedure Laterality Date    COLONOSCOPY N/A 1/9/2020    COLONOSCOPY WITH BIOPSY performed by Henrry Reeves MD at [facility] HYSTERECTOMY (CERVIX STATUS UNKNOWN)      12/22/2017    OVARY REMOVAL      TONSILLECTOMY      UPPER GASTROINTESTINAL ENDOSCOPY N/A 1/9/2020    EGD BIOPSY performed by Edda Mello MD at [facility] ENDOSCOPY     Family History   Problem Relation Age of Onset    Diabetes Mother     Breast Cancer Mother     Heart Disease Father     Diabetes Father     Hypertension Father     Cancer Father     Diabetes Sister     Cancer Maternal Grandmother     Diabetes Paternal Grandmother     Heart Attack Paternal Grandfather      Social History     Tobacco Use    Smoking status: Never     Passive exposure: Yes    Smokeless tobacco: Never   Substance Use Topics    Alcohol use: No     Comment: caffeine 2 cans/bottles daily        Review of Systems:   Constitutional: No Fever or Weight Loss   Eyes: No Decreased Vision  ENT: No Headaches, Hearing Loss or Vertigo  Cardiovascular: as per note above   Respiratory: No cough or wheezing and as per note above. Gastrointestinal: No abdominal pain, appetite loss, blood in stools, constipation, diarrhea or heartburn  Genitourinary: No dysuria, trouble voiding, or hematuria  Musculoskeletal:  denies any new  joint aches , swelling  or pain   Integumentary: No rash or pruritis  Neurological: No TIA or stroke symptoms  Psychiatric: No anxiety or depression  Endocrine: No malaise, fatigue or temperature intolerance  Hematologic/Lymphatic: No bleeding problems, blood clots or swollen lymph nodes  Allergic/Immunologic: No nasal congestion or hives    Objective:      Physical Exam:  /78 (Site: Left Upper Arm, Position: Sitting, Cuff Size: Large Adult)   Ht 5' 4\" (1.626 m)   Wt (!) 345 lb 6.4 oz (156.7 kg)   SpO2 97%   BMI 59.29 kg/m²   Wt Readings from Last 3 Encounters:   11/29/22 (!) 340 lb 6.4 oz (154.4 kg)   11/29/22 (!) 345 lb 6.4 oz (156.7 kg)   10/24/22 (!) 341 lb (154.7 kg)     Body mass index is 59.29 kg/m².   Vitals:    11/29/22 1338   BP: 122/78   SpO2: 97%        General Appearance:  No distress, conversant  Constitutional:  Well developed, Well nourished, No acute distress, Non-toxic appearance. HENT:  Normocephalic, Atraumatic, Bilateral external ears normal, Oropharynx moist, No oral exudates, Nose normal. Neck- Normal range of motion, No tenderness, Supple, No stridor,no apical-carotid delay  Eyes:  PERRL, EOMI, Conjunctiva normal, No discharge. Respiratory:  Normal breath sounds, No respiratory distress, No wheezing, No chest tenderness. ,no use of accessory muscles, NO crackles  Cardiovascular: (PMI) apex non displaced,no lifts no thrills,S1 and S2 audible, No added heart sounds, No signs of ankle edema, or volume overload, No evidence of JVD, No crackles   GI:  Bowel sounds normal, Soft, No tenderness, No masses, No gross visceromegaly   :  No costovertebral angle tenderness   Musculoskeletal:  No edema, no tenderness, no deformities. Back- no tenderness  Integument:  Well hydrated, no rash   Lymphatic:  No lymphadenopathy noted   Neurologic:  Alert & oriented x 3, CN 2-12 normal, normal motor function, normal sensory function, no focal deficits noted   Psychiatric:  Speech and behavior appropriate       Medical decision making and Data review:  DATA:  No results found for: TROPONINT  BNP:  No results found for: PROBNP  PT/INR:  No results found for: PTINR  No results found for: LABA1C  No results found for: CHOL, TRIG, HDL, LDLCALC, LDLDIRECT  Lab Results   Component Value Date    ALT 21 11/29/2022    AST 21 11/29/2022     No results for input(s): WBC, HGB, HCT, MCV, PLT in the last 72 hours.     TSH: No results found for: TSH  Lab Results   Component Value Date    AST 21 11/29/2022    ALT 21 11/29/2022    BILITOT 0.5 11/29/2022    ALKPHOS 128 11/29/2022     No results found for: PROBNP  No results found for: LABA1C  Lab Results   Component Value Date    WBC 5.2 11/29/2022    HGB 13.1 11/29/2022    HCT 41.3 11/29/2022     11/29/2022     All labs, medications and tests reviewed by myself including data and history from outside source , patient and available family . Assessment & Plan:      1. Hypertension, unspecified type    2. FH: CAD (coronary artery disease)    3. Abnormal EKG    4. Anxiety    5. Precordial pain    6. Pre-op evaluation         Pre-op evaluation   We will get a Lexiscan as she cannot walk much due to knee pain and using a cane to walk. Also get an echo due to shortness of breath check BNP    Hypertension   Blood pressure generally well controlled currently not on any medication she is on Januvia. Diabetes     Dyslipidemia :  All available lab work was reviewed. Patient was advised to repeat lab work before next visit. Necessary orders were placed , instructions given by myself       Counseled extensively and medication compliance urged. We discussed that for the  prevention of ASCVD our  goal is aggressive risk modification. Patient is encouraged to exercise if they can , educated about  brisk walk for 30 minutes  at least 3 to 4 times a week if there are no physical limitations  Various goals were discussed and questions answered. Continue current medications. Appropriate prescriptions are addressed and refills ordered. Questions answered and patient verbalizes understanding. Call for any problems, questions, or concerns. Greater than 60 % of time spent counseling besides reviewing data and images     Continue all other medications of all above medical condition listed as is. Return in about 1 month (around 12/29/2022). Please note this report has been partially produced using speech recognition software and may contain errors related to that system including errors in grammar, punctuation, and spelling, as well as words and phrases that may be inappropriate. If there are any questions or concerns please feel free to contact the dictating provider for clarification.

## 2022-11-29 NOTE — PATIENT INSTRUCTIONS
Thank you for allowing us to care for you today! We want to ensure we can follow your treatment plan and we strive to give you the best outcomes and experience possible. If you ever have a life threatening emergency and call 911 - for an ambulance (EMS)   Our providers can only care for you at:   [facility] or [facility]. Even if you have someone take you or you drive yourself we can only care for you in a [facility]. Our providers are not setup at the other healthcare locations! Please be informed that if you contact our office outside of normal business hours the physician on call cannot help with any scheduling or rescheduling issues, procedure instruction questions or any type of medication issue. We advise you for any urgent/emergency that you go to the nearest emergency room! PLEASE CALL OUR OFFICE DURING NORMAL BUSINESS HOURS    Monday - Friday   8 am to 5 pm    [place] [address]: [phone]    [place]:  [phone]  **It is YOUR responsibilty to bring medication bottles and/or updated medication list to [address] [place] [address]. This will allow us to better serve you and all your healthcare needs**  [facility] Locations - No appointment necessary. Sites open Monday to Friday. Call your preferred location for test preparation, business hours and other information you need. [employer] accepts [employer]. [facility]   [facility] [address], [address] [facility]  Phone: [phone] [facility] [address] [facility]  [address].   Kofi Cruz  Phone: [phone]

## 2022-11-29 NOTE — ASSESSMENT & PLAN NOTE
We will get a Lexiscan as she cannot walk much due to knee pain and using a cane to walk.   Also get an echo due to shortness of breath check BNP

## 2022-12-01 LAB
CULTURE: ABNORMAL
CULTURE: ABNORMAL
EKG ATRIAL RATE: 83 BPM
EKG DIAGNOSIS: NORMAL
EKG P AXIS: 24 DEGREES
EKG P-R INTERVAL: 154 MS
EKG Q-T INTERVAL: 384 MS
EKG QRS DURATION: 84 MS
EKG QTC CALCULATION (BAZETT): 451 MS
EKG R AXIS: -3 DEGREES
EKG T AXIS: 31 DEGREES
EKG VENTRICULAR RATE: 83 BPM
Lab: ABNORMAL
SPECIMEN: ABNORMAL

## 2022-12-01 PROCEDURE — 93010 ELECTROCARDIOGRAM REPORT: CPT | Performed by: INTERNAL MEDICINE

## 2022-12-09 ENCOUNTER — PROCEDURE VISIT (OUTPATIENT)
Dept: CARDIOLOGY CLINIC | Age: 52
End: 2022-12-09

## 2022-12-09 DIAGNOSIS — I10 HYPERTENSION, UNSPECIFIED TYPE: Primary | ICD-10-CM

## 2022-12-09 DIAGNOSIS — R06.02 SOB (SHORTNESS OF BREATH): ICD-10-CM

## 2022-12-09 DIAGNOSIS — Z82.49 FH: CAD (CORONARY ARTERY DISEASE): ICD-10-CM

## 2022-12-09 DIAGNOSIS — I10 HYPERTENSION, UNSPECIFIED TYPE: ICD-10-CM

## 2022-12-09 DIAGNOSIS — Z01.810 PRE-OPERATIVE CARDIOVASCULAR EXAMINATION, HIGH RISK SURGERY: Primary | ICD-10-CM

## 2022-12-12 ENCOUNTER — TELEPHONE (OUTPATIENT)
Dept: CARDIOLOGY CLINIC | Age: 52
End: 2022-12-12

## 2022-12-13 ENCOUNTER — TELEPHONE (OUTPATIENT)
Dept: CARDIOLOGY CLINIC | Age: 52
End: 2022-12-13

## 2022-12-13 ENCOUNTER — ON CAMPUS - OUTPATIENT (OUTPATIENT)
Dept: URBAN - METROPOLITAN AREA HOSPITAL 105 | Facility: HOSPITAL | Age: 52
End: 2022-12-13
Payer: COMMERCIAL

## 2022-12-13 DIAGNOSIS — K57.30 DIVERTICULOSIS OF LARGE INTESTINE WITHOUT PERFORATION OR ABS: ICD-10-CM

## 2022-12-13 DIAGNOSIS — Z12.11 ENCOUNTER FOR SCREENING FOR MALIGNANT NEOPLASM OF COLON: ICD-10-CM

## 2022-12-13 DIAGNOSIS — K63.89 OTHER SPECIFIED DISEASES OF INTESTINE: ICD-10-CM

## 2022-12-13 PROCEDURE — 45380 COLONOSCOPY AND BIOPSY: CPT | Mod: 33 | Performed by: INTERNAL MEDICINE

## 2022-12-13 NOTE — TELEPHONE ENCOUNTER
Corazon Metcalf . ECG portion of stress test is negative for ischemia by diagnostic criteria. Normal EF 68 % with normal ventricular contractility. No infarct or ischemia    noted. Anterior wall defect consistent with breast artifact    No ischemia , Normal stress test   ECHOCARDIOGRAM      Summary   Left ventricular function and size is normal, EF is estimated at 55-60%. Mild left ventricular hypertrophy. Grade I diastolic dysfunction. Mildly dilated left atrium. Mitral annular calcification is present. No significant valvular regurgitation noted. No evidence of pericardial effusion. Left message for pt to return call regarding results    Spoke to pt regarding test results.  Pt voiced understanding

## 2022-12-19 ENCOUNTER — HOSPITAL ENCOUNTER (OUTPATIENT)
Dept: PHYSICAL THERAPY | Age: 52
Setting detail: THERAPIES SERIES
Discharge: HOME | End: 2022-12-19
Payer: COMMERCIAL

## 2022-12-19 PROCEDURE — 97161 PT EVAL LOW COMPLEX 20 MIN: CPT

## 2022-12-19 PROCEDURE — 97110 THERAPEUTIC EXERCISES: CPT

## 2022-12-19 NOTE — PLAN OF CARE
Outpatient Physical Therapy           [place]           [x] Phone: [phone]   Fax: [phone]  [place]           [] Phone: [phone]   Fax: [phone]     To:  Oren Riley DO   From: Amber Price, PT, DPT     Patient: Madi Delgado       : 1970  Diagnosis:  Diagnosis: Primary OA L knee. Chronic routine pain of L knee. Treatment Diagnosis: L knee pain, impaired gait, impaired L knee ROM  Date: 2022    Physical Therapy Certification/Re-Certification Form  Dear Dr. Mook Yost,  The following patient has been evaluated for physical therapy services and for therapy to continue, insurance requires physician review of the treatment plan initially and every 90 days. Please review the attached evaluation and/or summary of the patient's plan of care, and verify that you agree therapy should continue by signing the attached document and sending it back to our office. Assessment:    Assessment: Pt is a 43-year-old female who presents to therapy for pre-op evaluation of L TKA set for 23. Upon assessment, pt does present with L knee pain, impaired gait, impaired ROM, impaired strength and overall reduced independence w/ ADL and IADL tasks pain free. During this visit, PT reviewed pre and post op exercise plan, proper AD use and stair completion to prepare for surgery. Pt will return after TKA for post op eval and further therapy. Pt would benefit from skilled therapy interventions to address listed impairments, progress toward goal completion and improve ADL/IADL status. PT also warranted to reduce risk for further injury or decline. Patient agrees with established plan of care and assisted in the development of their short term and long term goals. Patient had no adverse reaction with initial treatment and there are no barriers to learning. Learning preferences include demonstration, practice, and handouts.   Patient expressed understanding of HEP and appears to be motivated to participate in an active PT program including compliance with HEP expectations. Plan of Care/Treatment to date:  [x] Therapeutic Exercise  [x] Modalities:  [x] Therapeutic Activity     [x] Ultrasound  [x] Electrical Stimulation  [x] Gait Training      [] Cervical Traction [] Lumbar Traction  [x] Neuromuscular Re-education    [x] Cold/hotpack [] Iontophoresis   [x] Instruction in HEP      [x] Vasopneumatic    [] Dry Needling  [x] Manual Therapy               [] Aquatic Therapy       Other:          Frequency/Duration:  # Days per week: [x] 1 day # Weeks: [] 1 week [] 5 weeks     [] 2 days   [x] 2 weeks [] 6 weeks     [] 3 days   [] 3 weeks [] 7 weeks     [] 4 days   [] 4 weeks [] 8 weeks         [] 9 weeks [] 10 weeks         [] 11 weeks [] 12 weeks    Rehab Potential/Progress: [] Excellent [x] Good [] Fair  [] Poor     Goals:    Patient goals: prepare for TKA  Short term goals  Time Frame for Short term goals: During this visit  Pt will demonstrate good understanding of exercise progression pre & post-surgery  Pt will demonstrate good understanding of walker use post-surgery  Pt will demonstrate good understanding of ascending/descending stairs after surgery        Long Term Goals  Time Frame for Long Term Goals: Refer to STG                       Electronically signed by:  Irineo Lim PT, DPT, 12/19/2022, 4:49 PM        If you have any questions or concerns, please don't hesitate to call.   Thank you for your referral.      Physician Signature:________________________________Date:_________ TIME: _____  By signing above, therapists plan is approved by physician

## 2022-12-19 NOTE — PROGRESS NOTES
Physical Therapy: Initial Evaluation    Patient: Tigre Vazquez (1 y.o. female)   Examination Date: 2419  Plan of Care Certification Period: 2022 to        :  1970 ;    Confirmed: Y MRN: [id number]  CSN: [id number]   Insurance: Payor: Juanis Swanson / Plan: [facility] / Product Type: *No Product type* /   Insurance ID: [id number] - (Commercial) Secondary Insurance (if applicable):    Referring Physician: DO Kevin Castro DO   PCP: Ignacio Cooper MD Visits to Date/Visits Approved:   /      No Show/Cancelled Appts:   /       Medical Diagnosis: Primary osteoarthritis of left knee [M17.12]  Chronic pain of left knee [M25.562, G89.29] Primary OA L knee. Chronic routine pain of L knee. Treatment Diagnosis: L knee pain, impaired gait, impaired L knee ROM     PERTINENT MEDICAL HISTORY   Patient Assessed for Rehabilitation Services: Yes       Medical History: Chart Reviewed: Yes   Past Medical History:   Diagnosis Date    Arthritis     Cancer ([facility] [address]     [facility] treatment Ovarian and endometrial CA    Diabetes ([facility] [address]     Endometrial cancer ([facility] [address]     H/O cardiovascular stress test 3/17/2014    EF58% normal study    H/O echocardiogram 2014    EF 55%, normal LV systolic function, mildly dilated left atrium, normal sized right ventricle, normal valves, mild mitral and tricuspid regurg.     Ovarian cancer [facility])      Surgical History:   Past Surgical History:   Procedure Laterality Date    COLONOSCOPY N/A 2020    COLONOSCOPY WITH BIOPSY performed by Tyree Kanner, MD at [address] [place] [address] ([address] [place] [address])      2017    OVARY REMOVAL      TONSILLECTOMY      UPPER GASTROINTESTINAL ENDOSCOPY N/A 2020    EGD BIOPSY performed by Tyree Kanner, MD at [address] [facility] ENDOSCOPY       Medications:   Current Outpatient Medications:     TRULICITY 7.19 OE/1.4OY RENYN, , Disp: , Rfl:     metFORMIN (GLUCOPHAGE) 850 MG tablet, 2 times daily, Disp: , Rfl:     atorvastatin (LIPITOR) 20 MG tablet, Take 20 mg by mouth daily, Disp: , Rfl:     SITagliptin (JANUVIA) 50 MG tablet, Take 50 mg by mouth daily (Patient not taking: No sig reported), Disp: , Rfl:     pantoprazole (PROTONIX) 40 MG injection, Infuse 40 mg intravenously daily, Disp: , Rfl:     zolpidem (AMBIEN) 10 MG tablet, Take 10 mg by mouth nightly as needed. (Patient not taking: No sig reported), Disp: , Rfl:     glyBURIDE (DIABETA) 5 MG tablet, 10 mg 2 times daily (with meals), Disp: , Rfl:   Allergies: Patient has no known allergies. SUBJECTIVE EXAMINATION     History obtained from[de-identified] Patient, Chart Review,           Subjective History:    Subjective: Pt is set to have a L TKA on 1/5/23. No current pain or N/T did have pain this morning. Lives in an apartment with no CHIDI, currently using SPC for gait. 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   Additional Pertinent Hx (if applicable): OA, DM, CA, L knee pain, x-ray          Learning/Language: Learning  Does the patient/guardian have any barriers to learning?: No barriers     Pain Screening   Pain Screening  Patient Currently in Pain: No    Functional Status         Social History:  Social History  Lives With: Alone  Type of Home: Apartment  Home Layout: One level    Occupation/Interests:  Occupation: Full time employment  Type of Occupation: Teacher, off for 12 weeks after surgery    Prior Level of Function:    Independent        Current Level of Function:  MI w/ ADLs and IADLs with some pain              OBJECTIVE EXAMINATION   Restrictions: n/a             Review of Systems:  Vision: Within Functional Limits  Hearing: Within functional limits  Follows Commands: Within Functional Limits      Regional Screen:   Lumbar Screen: [facility] [employer] [facility]  Hip Screen: [facility] [employer] [facility]  Ankle Screen: [facility] [employer] [facility]          Palpation:   Right Knee Palpation: no TTP  Left Knee Palpation: mild discomfort general joint region inferior    Ambulation/Gait (if applicable): WBOS w/ SPC     Left AROM  Right AROM         AROM LLE (degrees)  L Knee Flexion (0-145): 106  L Knee Extension (0): 0    AROM RLE (degrees)  R Knee Flexion (0-145): 116  R Knee Extension (0): 0     Left Strength  Right Strength      General Strength Testing LE:  (gross BLE 4+/5)    General Strength Testing LE:  (gross BLE 4+/5)        Joint Mobility (if applicable):   Joint Integrity Knee  Left Patella Glides: WFL M/L. mild hypomobility sup/inf    LEFS: 26/80       ASSESSMENT     Impression: Assessment: Pt is a 80-year-old female who presents to therapy for pre-op evaluation of L TKA set for 1/5/23. Upon assessment, pt does present with L knee pain, impaired gait, impaired ROM, impaired strength and overall reduced independence w/ ADL and IADL tasks pain free. During this visit, PT reviewed pre and post op exercise plan, proper AD use and stair completion to prepare for surgery. Pt will return after TKA for post op eval and further therapy. Pt would benefit from skilled therapy interventions to address listed impairments, progress toward goal completion and improve ADL/IADL status. PT also warranted to reduce risk for further injury or decline. Body Structures, Functions, Activity Limitations Requiring Skilled Therapeutic Intervention: Decreased functional mobility , Decreased ROM, Decreased tolerance to work activity, Decreased strength, Decreased high-level IADLs, Increased pain, Decreased endurance    Statement of Medical Necessity: Physical Therapy is both indicated and medically necessary as outlined in the POC to increase the likelihood of meeting the functionally related goals stated below. Patient agrees with established plan of care and assisted in the development of their short term and long term goals. Patient had no adverse reaction with initial treatment and there are no barriers to learning. Learning preferences include demonstration, practice, and handouts.   Patient expressed understanding of HEP and appears to be motivated to participate in an active PT program including compliance with HEP expectations.         Patient's Activity Tolerance: Patient tolerated evaluation without incident      Patient's rehabilitation potential/prognosis is considered to be: Good    Factors which may impact rehabilitation potential include: None        GOALS   Patient Goal(s): prepare for TKA  Short Term Goals Completed by During this visit Goal Status   Pt will demonstrate good understanding of exercise progression pre & post-surgery New   Pt will demonstrate good understanding of walker use post-surgery New   Pt will demonstrate good understanding of ascending/descending stairs after surgery New                                                 Long Term Goals Completed by Refer to STG Goal Status                                                                    TREATMENT PLAN       Requires PT Follow-Up: Yes  Treatment Initiated : yes on 12/19  Specific Instructions for Next Treatment: reeval after TKA    Pt. actively involved in establishing Plan of Care and Goals: Y  Patient/ Caregiver education and instruction: PT Role, Goals, Plan of Care, Evaluative findings, Anatomy of condition, Disease Specific Education, Home Exercise Program, Functional Mobility Training             Treatment may include any combination of the following: Current Treatment Recommendations: Strengthening, ROM, Balance training, Functional mobility training, Transfer training, ADL/Self-care training, IADL training, Gait training, Stair training, Neuromuscular re-education, Manual, Pain management, Return to work related activity, Home exercise program, Safety education & training, Patient/Caregiver education & training, Modalities, Therapeutic activities     Frequency / Duration:  Patient to be seen 1 for 2 weeks      Eval Complexity:    Decision Making: Low Complexity       Therapist Signature: Jay Corado PT  , DPT  Date: 42/82/8481     I certify that the above Therapy Services are being furnished while the patient is under my care. I agree with the treatment plan and certify that this therapy is necessary. [de-identified] Signature:  ___________________________   Date:_______                                                                   Tone Page DO        Physician Comments: _______________________________________________    Please sign and return to   [facility]. Please fax to the location listed below.  Alex Childress for this referral!    [address] [facility] [address], [address] [address]  Dept: [phone]  Dept Fax: [phone]  Loc: [phone]       POC NOTE

## 2022-12-19 NOTE — FLOWSHEET NOTE
Outpatient Physical Therapy  [place]           [x] Phone: [phone]   Fax: [phone]  [place]           [] Phone: [phone]   Fax: [phone]        Physical Therapy Daily Treatment Note  Date:  2022    Patient Name:  Aylin Guerin    :  1970  MRN: [id number]  Restrictions/Precautions: n/a  Diagnosis:    Diagnosis: Primary OA L knee. Chronic routine pain of L knee. Date of Injury/Surgery:   Treatment Diagnosis:  L knee pain, impaired gait, impaired L knee ROM  Insurance/Certification information: [facility]  Referring Physician:   Heather Manning DO   Next Doctor Visit:    Plan of care signed (Y/N):  sent   Outcome Measure: LEFS:   Visit# / total visits:   1/10  Pain level: 010   Goals:     Patient goals: prepare for TKA  Short term goals  Time Frame for Short term goals: During this visit  Pt will demonstrate good understanding of exercise progression pre & post-surgery  Pt will demonstrate good understanding of walker use post-surgery  Pt will demonstrate good understanding of ascending/descending stairs after surgery        Long Term Goals  Time Frame for Long Term Goals: Refer to STG                     Summary of Evaluation:  Assessment: Pt is a 51-year-old female who presents to therapy for pre-op evaluation of L TKA set for 23. Upon assessment, pt does present with L knee pain, impaired gait, impaired ROM, impaired strength and overall reduced independence w/ ADL and IADL tasks pain free. During this visit, PT reviewed pre and post op exercise plan, proper AD use and stair completion to prepare for surgery. Pt will return after TKA for post op eval and further therapy. Pt would benefit from skilled therapy interventions to address listed impairments, progress toward goal completion and improve ADL/IADL status. PT also warranted to reduce risk for further injury or decline. Subjective:  See eval         Any changes in Ambulatory Summary Sheet? None        Objective:  See eval           Exercises: (No more than 4 columns)   Exercise/Equipment 12/19/22 #1 Date Date           WARM UP                     TABLE      Heel slide X10, 5\"     Quad set X10, 3\"     SLR x10     SAQ X10, 3\"              STANDING                                                     PROPRIOCEPTION                                    MODALITIES                      Other Therapeutic Activities/Education:  HEP and importance of completion, POC and goals, anatomy and physiology related to condition      Home Exercise Program:  Issued, practiced and pt demo ability to perform 12/19/2022    Manual Treatments:  none      Modalities:  none      Communication with other providers:  POC sent      Assessment: Pt tolerated today's treatment without any adverse reactions or complications this date. End pain: same  Assessment: Pt is a 15-year-old female who presents to therapy for pre-op evaluation of L TKA set for 1/5/23. Upon assessment, pt does present with L knee pain, impaired gait, impaired ROM, impaired strength and overall reduced independence w/ ADL and IADL tasks pain free. During this visit, PT reviewed pre and post op exercise plan, proper AD use and stair completion to prepare for surgery. Pt will return after TKA for post op eval and further therapy. Pt would benefit from skilled therapy interventions to address listed impairments, progress toward goal completion and improve ADL/IADL status. PT also warranted to reduce risk for further injury or decline.       Plan for Next Session:   Specific Instructions for Next Treatment: reeval after TKA    Time In / Time Out:  7066 - 1489        Timed Code/Total Treatment Minutes:  44': 10' TE x1, 29' Eval x1      Next Progress Note due:  10th visit      Plan of Care Interventions:  [x] Therapeutic Exercise  [x] Modalities:  [x] Therapeutic Activity     [] Ultrasound  [x] Estim  [x] Gait Training      [] Cervical Traction [] Lumbar Traction  [x] Neuromuscular Re-education    [x] Cold/hotpack [] Iontophoresis   [x] Instruction in HEP      [x] Vasopneumatic   [] Dry Needling    [x] Manual Therapy               [] Aquatic Therapy              Electronically signed by:  Martha Lomeli PT, DPT 12/19/2022, 4:50 PM

## 2022-12-21 ENCOUNTER — OFFICE VISIT (OUTPATIENT)
Dept: CARDIOLOGY CLINIC | Age: 52
End: 2022-12-21
Payer: COMMERCIAL

## 2022-12-21 VITALS
SYSTOLIC BLOOD PRESSURE: 130 MMHG | HEART RATE: 84 BPM | DIASTOLIC BLOOD PRESSURE: 70 MMHG | WEIGHT: 293 LBS | OXYGEN SATURATION: 97 % | BODY MASS INDEX: 50.02 KG/M2 | HEIGHT: 64 IN

## 2022-12-21 DIAGNOSIS — E66.01 MORBID OBESITY (HCC): Primary | ICD-10-CM

## 2022-12-21 DIAGNOSIS — E66.01 CLASS 3 SEVERE OBESITY WITH SERIOUS COMORBIDITY AND BODY MASS INDEX (BMI) OF 50.0 TO 59.9 IN ADULT, UNSPECIFIED OBESITY TYPE (HCC): ICD-10-CM

## 2022-12-21 DIAGNOSIS — E11.9 TYPE 2 DIABETES MELLITUS WITHOUT COMPLICATION, WITHOUT LONG-TERM CURRENT USE OF INSULIN (HCC): ICD-10-CM

## 2022-12-21 DIAGNOSIS — I10 PRIMARY HYPERTENSION: ICD-10-CM

## 2022-12-21 PROCEDURE — G8417 CALC BMI ABV UP PARAM F/U: HCPCS | Performed by: NURSE PRACTITIONER

## 2022-12-21 PROCEDURE — 3074F SYST BP LT 130 MM HG: CPT | Performed by: NURSE PRACTITIONER

## 2022-12-21 PROCEDURE — 3017F COLORECTAL CA SCREEN DOC REV: CPT | Performed by: NURSE PRACTITIONER

## 2022-12-21 PROCEDURE — 1036F TOBACCO NON-USER: CPT | Performed by: NURSE PRACTITIONER

## 2022-12-21 PROCEDURE — 3046F HEMOGLOBIN A1C LEVEL >9.0%: CPT | Performed by: NURSE PRACTITIONER

## 2022-12-21 PROCEDURE — 3078F DIAST BP <80 MM HG: CPT | Performed by: NURSE PRACTITIONER

## 2022-12-21 PROCEDURE — G8484 FLU IMMUNIZE NO ADMIN: HCPCS | Performed by: NURSE PRACTITIONER

## 2022-12-21 PROCEDURE — 2022F DILAT RTA XM EVC RTNOPTHY: CPT | Performed by: NURSE PRACTITIONER

## 2022-12-21 PROCEDURE — G8427 DOCREV CUR MEDS BY ELIG CLIN: HCPCS | Performed by: NURSE PRACTITIONER

## 2022-12-21 PROCEDURE — 99213 OFFICE O/P EST LOW 20 MIN: CPT | Performed by: NURSE PRACTITIONER

## 2022-12-21 ASSESSMENT — ENCOUNTER SYMPTOMS
COUGH: 0
SHORTNESS OF BREATH: 0

## 2022-12-21 NOTE — PATIENT INSTRUCTIONS
Please be informed that if you contact our office outside of normal business hours the physician on call cannot help with any scheduling or rescheduling issues, procedure instruction questions or any type of medication issue. We advise you for any urgent/emergency that you go to the nearest emergency room! PLEASE CALL OUR OFFICE DURING NORMAL BUSINESS HOURS    Monday - Friday   8 am to 5 pm    [place] [place] [address]: [phone]    [place]:  [phone]      **It is YOUR responsibilty to bring medication bottles and/or updated medication list to [address] [place] [address]. This will allow us to better serve you and all your healthcare needs**    Thank you for allowing us to care for you today! We want to ensure we can follow your treatment plan and we strive to give you the best outcomes and experience possible. If you ever have a life threatening emergency and call 911 - for an ambulance (EMS)   Our providers can only care for you at:   [facility] or [facility]. Even if you have someone take you or you drive yourself we can only care for you in a [facility] facility. Our providers are not setup at the other healthcare locations!

## 2022-12-21 NOTE — PROGRESS NOTES
CARDIOLOGY  NOTE    2022    Derrick Ellis (:  1970) is a 46 y.o. female,an established patient with Dr. Fabiana Tan, here for evaluation of the following chief complaint(s):  Edema (Left ankle is swollen, knee has been hurting )        SUBJECTIVE/OBJECTIVE:    HPI  Levin Kehr  who has Past medical history as noted below. Levin Kehr comes in for evaluation before her knee surgery she does report shortness of breath exertion she had a colonoscopy last year and then a hysterectomy in 2017 but no recent cardiac work-up or stress test.  She denies any chest pain but reports shortness of breath with exertion but that could be related to deconditioning related  Dad has heart problems   History of ovarian cancer s/p chemo currently in remission      Review of Systems   Constitutional:  Negative for fatigue and fever. Respiratory:  Negative for cough and shortness of breath. Cardiovascular:  Negative for chest pain, palpitations and leg swelling. Musculoskeletal:  Negative for arthralgias and gait problem. Neurological:  Negative for dizziness, syncope, weakness, light-headedness and headaches. Vitals:    22 1449   BP: 130/70   Site: Left Lower Arm   Position: Sitting   Cuff Size: Medium Adult   Pulse: 84   SpO2: 97%   Weight: (!) 351 lb 12.8 oz (159.6 kg)   Height: 5' 4\" (1.626 m)       Wt Readings from Last 3 Encounters:   22 (!) 351 lb 12.8 oz (159.6 kg)   22 (!) 340 lb 6.4 oz (154.4 kg)   22 (!) 345 lb 6.4 oz (156.7 kg)       BP Readings from Last 3 Encounters:   22 130/70   22 132/78   22 122/78       Prior to Admission medications    Medication Sig Start Date End Date Taking?  Authorizing Provider   TRULICITY 8.43 GA/5.5FA SOPN  9/3/22  Yes Historical Provider, MD   metFORMIN (GLUCOPHAGE) 850 MG tablet 2 times daily 9/3/22  Yes Historical Provider, MD   atorvastatin (LIPITOR) 20 MG tablet Take 20 mg by mouth daily 22 Yes Historical Provider, MD pantoprazole (PROTONIX) 40 MG injection Infuse 40 mg intravenously daily   Yes Historical Provider, MD   glyBURIDE (DIABETA) 5 MG tablet 10 mg 2 times daily (with meals) 2/14/14  Yes Historical Provider, MD   SITagliptin (JANUVIA) 50 MG tablet Take 50 mg by mouth daily  Patient not taking: No sig reported    Historical Provider, MD   zolpidem (AMBIEN) 10 MG tablet Take 10 mg by mouth nightly as needed. Patient not taking: No sig reported 7/3/19   Historical Provider, MD       Physical Exam  Vitals reviewed. Constitutional:       General: She is not in acute distress. Appearance: Normal appearance. She is obese. She is not ill-appearing. HENT:      Head: Atraumatic. Neck:      Vascular: No carotid bruit. Cardiovascular:      Rate and Rhythm: Normal rate and regular rhythm. Pulses: Normal pulses. Heart sounds: Normal heart sounds. No murmur heard. Pulmonary:      Effort: Pulmonary effort is normal. No respiratory distress. Breath sounds: Normal breath sounds. Musculoskeletal:         General: No swelling or deformity. Cervical back: Neck supple. No muscular tenderness. Neurological:      Mental Status: She is alert.        Health Maintenance   Topic Date Due    Diabetic foot exam  Never done    A1C test (Diabetic or Prediabetic)  Never done    Depression Screen  Never done    HIV screen  Never done    Diabetic retinal exam  Never done    Hepatitis C screen  Never done    Hepatitis B vaccine (1 of 3 - Risk 3-dose series) Never done    DTaP/Tdap/Td vaccine (1 - Tdap) Never done    Shingles vaccine (1 of 2) Never done    Flu vaccine (1) 08/01/2022    Diabetic microalbuminuria test  08/26/2022    Lipids  02/16/2023    Breast cancer screen  04/13/2023    Colorectal Cancer Screen  10/14/2032    Pneumococcal 0-64 years Vaccine  Completed    COVID-19 Vaccine  Completed    Hepatitis A vaccine  Aged Out    Hib vaccine  Aged Out    Meningococcal (ACWY) vaccine  Aged Out       Lab Review No results found for: CHOL, TRIG, HDL, LDLDIRECT     NM stress 12/9/2022   Summary   Supervising physician Dr. Iris Pendleton . ECG portion of stress test is negative for ischemia by diagnostic criteria. Normal EF 68 % with normal ventricular contractility. No infarct or ischemia noted. Anterior wall defect consistent with breast artifact   No ischemia , Normal stress test    Echo 12/12/2022   Summary   Left ventricular function and size is normal, EF is estimated at 55-60%. Mild left ventricular hypertrophy. Grade I diastolic dysfunction. Mildly dilated left atrium. Mitral annular calcification is present. No significant valvular regurgitation noted. No evidence of pericardial effusion. ASSESSMENT/PLAN:   Pre-op evaluation  BMP, echo and stress are normal.   Low risk for knee surgery     Hypertension  Blood pressure generally well controlled currently not on any medication she is on Januvia. Dyslipidemia  All available lab work was reviewed. Patient was advised to repeat lab work before next visit. Necessary orders were placed , instructions given by myself      Obesity  BMI 60  Encouraged to exercise as ahe is able, even chair exercises. She is hoping knee replacement will alow her to be more mobile. Counseled extensively and medication compliance urged. We discussed that for the  prevention of ASCVD our  goal is aggressive risk modification. Patient is encouraged to exercise if they can , educated about  brisk walk for 30 minutes  at least 3 to 4 times a week if there are no physical limitations  Various goals were discussed and questions answered. Continue current medications. Appropriate prescriptions are addressed and refills ordered. Questions answered and patient verbalizes understanding. Call for any problems, questions, or concerns. Greater than 60 % of time spent counseling besides reviewing data and images       Return in about 1 year (around 12/21/2023).       An electronic signature was used to authenticate this note.     Electronically signed by ADRIANE Campos CNP on 12/21/2022 at 3:08 PM

## 2022-12-27 ENCOUNTER — ANESTHESIA EVENT (OUTPATIENT)
Dept: OPERATING ROOM | Age: 52
End: 2022-12-27
Payer: COMMERCIAL

## 2022-12-27 NOTE — ANESTHESIA PRE PROCEDURE
Department of Anesthesiology  Preprocedure Note       Name:  Raul Cool   Age:  46 y.o.  :  1970                                          MRN:  [id number]         Date:  2022      Surgeon: Lord Pastor):  Davon aP DO    Procedure: LEFT KNEE TOTAL ARTHROPLASTY (Left)    Medications prior to admission:   Prior to Admission medications    Medication Sig Start Date End Date Taking? Authorizing Provider   TRULICITY 9.63 ZM/9.9LV SOPN  9/3/22   Historical Provider, MD   metFORMIN (GLUCOPHAGE) 850 MG tablet 2 times daily 9/3/22   Historical Provider, MD   atorvastatin (LIPITOR) 20 MG tablet Take 20 mg by mouth daily 22  Historical Provider, MD   SITagliptin (JANUVIA) 50 MG tablet Take 50 mg by mouth daily  Patient not taking: No sig reported    Historical Provider, MD   pantoprazole (PROTONIX) 40 MG injection Infuse 40 mg intravenously daily    Historical Provider, MD   zolpidem (AMBIEN) 10 MG tablet Take 10 mg by mouth nightly as needed. Patient not taking: No sig reported 7/3/19   Historical Provider, MD   glyBURIDE (DIABETA) 5 MG tablet 10 mg 2 times daily (with meals) 14   Historical Provider, MD       Current medications:    Current Outpatient Medications   Medication Sig Dispense Refill    TRULICITY 4.79 LJ/5.8KZ SOPN       metFORMIN (GLUCOPHAGE) 850 MG tablet 2 times daily      atorvastatin (LIPITOR) 20 MG tablet Take 20 mg by mouth daily      SITagliptin (JANUVIA) 50 MG tablet Take 50 mg by mouth daily (Patient not taking: No sig reported)      pantoprazole (PROTONIX) 40 MG injection Infuse 40 mg intravenously daily      zolpidem (AMBIEN) 10 MG tablet Take 10 mg by mouth nightly as needed. (Patient not taking: No sig reported)      glyBURIDE (DIABETA) 5 MG tablet 10 mg 2 times daily (with meals)       No current facility-administered medications for this visit.        Allergies:  No Known Allergies    Problem List:    Patient Active Problem List   Diagnosis Code    Chest pain R07.9    Abnormal EKG R94.31    FH: CAD (coronary artery disease) Z82.49    Hypertension I10    Type 2 diabetes mellitus (HCC) E11.9    Obesity E66.9    H/O echocardiogram Z92.89    Endometrial cancer (HCC) C54.1    Pre-op evaluation Z01.818    Ovarian cancer, bilateral ([address] C56.3    Port-A-Cath in place Z95.828    Anxiety F41.9    Chronic pain of left knee M25.562, G89.29    Irritable bowel syndrome with diarrhea K58.0    Malignant neoplasm of endometrium of corpus uteri (HCC) C54.1    Morbid obesity (HCC) E66.01       Past Medical History:        Diagnosis Date    Arthritis     Cancer ([facility]     [facility] treatment Ovarian and endometrial CA    Diabetes [facility] [address]     Endometrial cancer ([facility] [address]     H/O cardiovascular stress test 3/17/2014    EF58% normal study    H/O echocardiogram 03/17/2014    EF 55%, normal LV systolic function, mildly dilated left atrium, normal sized right ventricle, normal valves, mild mitral and tricuspid regurg.  Ovarian cancer [facility])        Past Surgical History:        Procedure Laterality Date    COLONOSCOPY N/A 1/9/2020    COLONOSCOPY WITH BIOPSY performed by Ke Kenny MD at [address] [place] [address] ([address] [place] [address])      12/22/2017    OVARY REMOVAL      TONSILLECTOMY      UPPER GASTROINTESTINAL ENDOSCOPY N/A 1/9/2020    EGD BIOPSY performed by Ke Kenny MD at [facility] ENDOSCOPY       Social History:    Social History     Tobacco Use    Smoking status: Never     Passive exposure: Yes    Smokeless tobacco: Never   Substance Use Topics    Alcohol use: No     Comment: caffeine 2 cans/bottles daily                                Counseling given: Not Answered      Vital Signs (Current): There were no vitals filed for this visit.                                            BP Readings from Last 3 Encounters:   12/21/22 130/70   11/29/22 132/78   11/29/22 122/78       NPO Status: BMI:   Wt Readings from Last 3 Encounters:   12/21/22 (!) 351 lb 12.8 oz (159.6 kg)   11/29/22 (!) 340 lb 6.4 oz (154.4 kg)   11/29/22 (!) 345 lb 6.4 oz (156.7 kg)     There is no height or weight on file to calculate BMI.    CBC:   Lab Results   Component Value Date/Time    WBC 5.2 11/29/2022 10:26 AM    RBC 4.71 11/29/2022 10:26 AM    HGB 13.1 11/29/2022 10:26 AM    HCT 41.3 11/29/2022 10:26 AM    MCV 87.7 11/29/2022 10:26 AM    RDW 13.7 11/29/2022 10:26 AM     11/29/2022 10:26 AM       CMP:   Lab Results   Component Value Date/Time     11/29/2022 10:26 AM    K 4.4 11/29/2022 10:26 AM     11/29/2022 10:26 AM    CO2 25 11/29/2022 10:26 AM    BUN 15 11/29/2022 10:26 AM    CREATININE 0.8 11/29/2022 10:26 AM    GFRAA >60 12/01/2017 03:51 PM    LABGLOM >60 11/29/2022 10:26 AM    GLUCOSE 169 11/29/2022 10:26 AM    PROT 6.9 11/29/2022 10:26 AM    CALCIUM 9.5 11/29/2022 10:26 AM    BILITOT 0.5 11/29/2022 10:26 AM    ALKPHOS 128 11/29/2022 10:26 AM    AST 21 11/29/2022 10:26 AM    ALT 21 11/29/2022 10:26 AM       POC Tests: No results for input(s): POCGLU, POCNA, POCK, POCCL, POCBUN, POCHEMO, POCHCT in the last 72 hours.     Coags: No results found for: PROTIME, INR, APTT    HCG (If Applicable):   Lab Results   Component Value Date    PREGTESTUR NEGATIVE 12/01/2017        ABGs: No results found for: PHART, PO2ART, FJC5LRJ, NXG6VHG, BEART, X1XFERUT     Type & Screen (If Applicable):  No results found for: LABABO, [address]    Anesthesia Evaluation  Patient summary reviewed no history of anesthetic complications:   Airway: Mallampati: III  TM distance: >3 FB   Neck ROM: full  Mouth opening: > = 3 FB   Dental:          Pulmonary:   (+) decreased breath sounds                            Cardiovascular:    (+) hypertension:, hyperlipidemia      ECG reviewed  Rhythm: regular    Echocardiogram reviewed  Stress test reviewed  Cleared by cardiology     Beta Blocker:  Not on Beta Blocker      ROS comment: Sinus  Rhythm   Low voltage in precordial leads. -RSR(V1) -nondiagnostic.    -Old anterior infarct. ABNORMAL 12/22     Summary   Left ventricular function and size is normal, EF is estimated at 55-60%. Mild left ventricular hypertrophy. Grade I diastolic dysfunction. Mildly dilated left atrium. Mitral annular calcification is present. No significant valvular regurgitation noted. No evidence of pericardial effusion. Signature      ------------------------------------------------------------------   Electronically signed by Abhilash Lopez MD (Interpreting   physician) on 12/12/2022 at 05:25 [facility] physician Dr. Keesha Michelle .  [place] portion of stress test is negative for ischemia by diagnostic criteria.   Normal EF 68 % with normal ventricular contractility. No infarct or ischemia   noted.   Anterior wall defect consistent with breast artifact   No ischemia , Normal stress test      Signatures      ------------------------------------------------------------------   Electronically signed by Abhilash Lopez MD (Interpreting   cardiologist) on 12/12/2022 at 17:32    Alphonse Litten was evaluated from a cardiac standpoint for her surgery or procedure and based on her history, diagnosis, recent cardiac testing, she is considered a low risk candidate for any crystal-operative cardiac complications.     Patients with known CAD with moderate or high risk should have surgical procedures done where they have access to invasive cardiology services if emergently needed.     Antiplatelet/anticoagulant therapy can be held prior to the surgery or procedure at the discretion of the surgeon to be resumed as soon as possible if held.     Please call with any further questions.   Respectfully,      ADRIANE Vazquez-CNP  Tb/bl     This cardiac clearance is good for 6 months from 12/22/2022 unless new cardiac symptoms arise.        Neuro/Psych:               GI/Hepatic/Renal:   (+) GERD:, morbid obesity          Endo/Other:    (+) DiabetesType II DM, , : arthritis: OA., malignancy/cancer. Abdominal:   (+) obese,           Vascular: Other Findings:             Anesthesia Plan      general and regional     ASA 3     (Pt refuses SAB. Mother is retired nurse who advised pt  Pt requested block )  Induction: intravenous. MIPS: Postoperative opioids intended and Prophylactic antiemetics administered. Anesthetic plan and risks discussed with patient. Use of blood products discussed with patient whom. Plan discussed with CRNA.     Attending anesthesiologist reviewed and agrees with Preprocedure content                ADRIANE Sierra - CRNA   12/27/2022

## 2022-12-29 ENCOUNTER — TELEPHONE (OUTPATIENT)
Dept: ORTHOPEDIC SURGERY | Age: 52
End: 2022-12-29

## 2022-12-29 PROBLEM — Z01.818 PRE-OP EVALUATION: Status: RESOLVED | Noted: 2018-03-01 | Resolved: 2022-12-29

## 2022-12-29 NOTE — TELEPHONE ENCOUNTER
[address] for Left Total Knee Arthroplasty scheduled 1/5/23  Status: Approved CPT 93279 ICD M17.12/M25.562  Auth# [id number]

## 2023-01-04 NOTE — PROGRESS NOTES
1/4/23  - Notified patient surgery @ [facility] on 1/5/23 @ 0815, arrival 0600. Understanding verbalized.

## 2023-01-05 ENCOUNTER — HOSPITAL ENCOUNTER (INPATIENT)
Age: 53
LOS: 1 days | Discharge: HOME | DRG: 470 | End: 2023-01-07
Attending: ORTHOPAEDIC SURGERY | Admitting: ORTHOPAEDIC SURGERY
Payer: COMMERCIAL

## 2023-01-05 ENCOUNTER — ANESTHESIA (OUTPATIENT)
Dept: OPERATING ROOM | Age: 53
End: 2023-01-05
Payer: COMMERCIAL

## 2023-01-05 ENCOUNTER — APPOINTMENT (OUTPATIENT)
Dept: GENERAL RADIOLOGY | Age: 53
DRG: 470 | End: 2023-01-05
Attending: ORTHOPAEDIC SURGERY
Payer: COMMERCIAL

## 2023-01-05 DIAGNOSIS — Z96.652 HISTORY OF TOTAL LEFT KNEE REPLACEMENT: ICD-10-CM

## 2023-01-05 DIAGNOSIS — Z01.818 PRE-OP TESTING: Primary | ICD-10-CM

## 2023-01-05 LAB
ESTIMATED AVERAGE GLUCOSE: 154 MG/DL
GLUCOSE BLD-MCNC: 132 MG/DL (ref 70–99)
GLUCOSE BLD-MCNC: 315 MG/DL (ref 70–99)
GLUCOSE BLD-MCNC: 98 MG/DL (ref 70–99)
HBA1C MFR BLD: 7 % (ref 4.2–6.3)

## 2023-01-05 PROCEDURE — 97162 PT EVAL MOD COMPLEX 30 MIN: CPT

## 2023-01-05 PROCEDURE — 6360000002 HC RX W HCPCS: Performed by: NURSE ANESTHETIST, CERTIFIED REGISTERED

## 2023-01-05 PROCEDURE — 2500000003 HC RX 250 WO HCPCS: Performed by: NURSE ANESTHETIST, CERTIFIED REGISTERED

## 2023-01-05 PROCEDURE — 6360000002 HC RX W HCPCS: Performed by: ORTHOPAEDIC SURGERY

## 2023-01-05 PROCEDURE — C9290 INJ, BUPIVACAINE LIPOSOME: HCPCS | Performed by: ORTHOPAEDIC SURGERY

## 2023-01-05 PROCEDURE — 94150 VITAL CAPACITY TEST: CPT

## 2023-01-05 PROCEDURE — 3700000001 HC ADD 15 MINUTES (ANESTHESIA): Performed by: ORTHOPAEDIC SURGERY

## 2023-01-05 PROCEDURE — 0SRD0JA REPLACEMENT OF LEFT KNEE JOINT WITH SYNTHETIC SUBSTITUTE, UNCEMENTED, OPEN APPROACH: ICD-10-PCS | Performed by: ORTHOPAEDIC SURGERY

## 2023-01-05 PROCEDURE — 2580000003 HC RX 258: Performed by: NURSE ANESTHETIST, CERTIFIED REGISTERED

## 2023-01-05 PROCEDURE — 6370000000 HC RX 637 (ALT 250 FOR IP): Performed by: ORTHOPAEDIC SURGERY

## 2023-01-05 PROCEDURE — 97166 OT EVAL MOD COMPLEX 45 MIN: CPT

## 2023-01-05 PROCEDURE — A4217 STERILE WATER/SALINE, 500 ML: HCPCS | Performed by: ORTHOPAEDIC SURGERY

## 2023-01-05 PROCEDURE — 97535 SELF CARE MNGMENT TRAINING: CPT

## 2023-01-05 PROCEDURE — 7100000000 HC PACU RECOVERY - FIRST 15 MIN: Performed by: ORTHOPAEDIC SURGERY

## 2023-01-05 PROCEDURE — 2500000003 HC RX 250 WO HCPCS: Performed by: ORTHOPAEDIC SURGERY

## 2023-01-05 PROCEDURE — 2709999900 HC NON-CHARGEABLE SUPPLY: Performed by: ORTHOPAEDIC SURGERY

## 2023-01-05 PROCEDURE — 3700000000 HC ANESTHESIA ATTENDED CARE: Performed by: ORTHOPAEDIC SURGERY

## 2023-01-05 PROCEDURE — 97530 THERAPEUTIC ACTIVITIES: CPT

## 2023-01-05 PROCEDURE — 2580000003 HC RX 258: Performed by: ORTHOPAEDIC SURGERY

## 2023-01-05 PROCEDURE — 6370000000 HC RX 637 (ALT 250 FOR IP): Performed by: NURSE PRACTITIONER

## 2023-01-05 PROCEDURE — 2700000000 HC OXYGEN THERAPY PER DAY

## 2023-01-05 PROCEDURE — 3600000005 HC SURGERY LEVEL 5 BASE: Performed by: ORTHOPAEDIC SURGERY

## 2023-01-05 PROCEDURE — 7100000001 HC PACU RECOVERY - ADDTL 15 MIN: Performed by: ORTHOPAEDIC SURGERY

## 2023-01-05 PROCEDURE — 94664 DEMO&/EVAL PT USE INHALER: CPT

## 2023-01-05 PROCEDURE — 82962 GLUCOSE BLOOD TEST: CPT

## 2023-01-05 PROCEDURE — 3600000015 HC SURGERY LEVEL 5 ADDTL 15MIN: Performed by: ORTHOPAEDIC SURGERY

## 2023-01-05 PROCEDURE — 36415 COLL VENOUS BLD VENIPUNCTURE: CPT

## 2023-01-05 PROCEDURE — C1776 JOINT DEVICE (IMPLANTABLE): HCPCS | Performed by: ORTHOPAEDIC SURGERY

## 2023-01-05 PROCEDURE — 27447 TOTAL KNEE ARTHROPLASTY: CPT | Performed by: ORTHOPAEDIC SURGERY

## 2023-01-05 PROCEDURE — 94761 N-INVAS EAR/PLS OXIMETRY MLT: CPT

## 2023-01-05 PROCEDURE — 6360000002 HC RX W HCPCS: Performed by: ANESTHESIOLOGY

## 2023-01-05 PROCEDURE — 64447 NJX AA&/STRD FEMORAL NRV IMG: CPT | Performed by: ANESTHESIOLOGY

## 2023-01-05 PROCEDURE — 73560 X-RAY EXAM OF KNEE 1 OR 2: CPT

## 2023-01-05 PROCEDURE — 83036 HEMOGLOBIN GLYCOSYLATED A1C: CPT

## 2023-01-05 RX ORDER — HYDRALAZINE HYDROCHLORIDE 20 MG/ML
10 INJECTION INTRAMUSCULAR; INTRAVENOUS
Status: DISCONTINUED | OUTPATIENT
Start: 2023-01-05 | End: 2023-01-05 | Stop reason: HOSPADM

## 2023-01-05 RX ORDER — KETAMINE HCL 50MG/ML(1)
SYRINGE (ML) INTRAVENOUS PRN
Status: DISCONTINUED | OUTPATIENT
Start: 2023-01-05 | End: 2023-01-05 | Stop reason: SDUPTHER

## 2023-01-05 RX ORDER — ATORVASTATIN CALCIUM 10 MG/1
20 TABLET, FILM COATED ORAL DAILY
Status: DISCONTINUED | OUTPATIENT
Start: 2023-01-06 | End: 2023-01-07 | Stop reason: HOSPADM

## 2023-01-05 RX ORDER — SODIUM CHLORIDE 0.9 % (FLUSH) 0.9 %
5-40 SYRINGE (ML) INJECTION PRN
Status: DISCONTINUED | OUTPATIENT
Start: 2023-01-05 | End: 2023-01-05 | Stop reason: HOSPADM

## 2023-01-05 RX ORDER — KETOROLAC TROMETHAMINE 30 MG/ML
15 INJECTION, SOLUTION INTRAMUSCULAR; INTRAVENOUS EVERY 6 HOURS PRN
Status: DISCONTINUED | OUTPATIENT
Start: 2023-01-05 | End: 2023-01-07 | Stop reason: HOSPADM

## 2023-01-05 RX ORDER — FENTANYL CITRATE 50 UG/ML
25 INJECTION, SOLUTION INTRAMUSCULAR; INTRAVENOUS EVERY 5 MIN PRN
Status: DISCONTINUED | OUTPATIENT
Start: 2023-01-05 | End: 2023-01-05 | Stop reason: HOSPADM

## 2023-01-05 RX ORDER — ACETAMINOPHEN 500 MG
1000 TABLET ORAL ONCE
Status: COMPLETED | OUTPATIENT
Start: 2023-01-05 | End: 2023-01-05

## 2023-01-05 RX ORDER — SODIUM CHLORIDE 0.9 % (FLUSH) 0.9 %
5-40 SYRINGE (ML) INJECTION EVERY 12 HOURS SCHEDULED
Status: DISCONTINUED | OUTPATIENT
Start: 2023-01-05 | End: 2023-01-05 | Stop reason: HOSPADM

## 2023-01-05 RX ORDER — SODIUM CHLORIDE 9 MG/ML
INJECTION, SOLUTION INTRAVENOUS PRN
Status: DISCONTINUED | OUTPATIENT
Start: 2023-01-05 | End: 2023-01-07 | Stop reason: HOSPADM

## 2023-01-05 RX ORDER — FENTANYL CITRATE 50 UG/ML
50 INJECTION, SOLUTION INTRAMUSCULAR; INTRAVENOUS EVERY 5 MIN PRN
Status: DISCONTINUED | OUTPATIENT
Start: 2023-01-05 | End: 2023-01-05 | Stop reason: HOSPADM

## 2023-01-05 RX ORDER — DEXTROSE MONOHYDRATE 100 MG/ML
INJECTION, SOLUTION INTRAVENOUS CONTINUOUS PRN
Status: DISCONTINUED | OUTPATIENT
Start: 2023-01-05 | End: 2023-01-07 | Stop reason: HOSPADM

## 2023-01-05 RX ORDER — SODIUM CHLORIDE, SODIUM LACTATE, POTASSIUM CHLORIDE, CALCIUM CHLORIDE 600; 310; 30; 20 MG/100ML; MG/100ML; MG/100ML; MG/100ML
INJECTION, SOLUTION INTRAVENOUS CONTINUOUS
Status: DISCONTINUED | OUTPATIENT
Start: 2023-01-05 | End: 2023-01-07

## 2023-01-05 RX ORDER — TRANEXAMIC ACID 10 MG/ML
1000 INJECTION, SOLUTION INTRAVENOUS
Status: COMPLETED | OUTPATIENT
Start: 2023-01-05 | End: 2023-01-05

## 2023-01-05 RX ORDER — EPHEDRINE SULFATE 50 MG/ML
INJECTION INTRAVENOUS PRN
Status: DISCONTINUED | OUTPATIENT
Start: 2023-01-05 | End: 2023-01-05 | Stop reason: SDUPTHER

## 2023-01-05 RX ORDER — FENTANYL CITRATE 50 UG/ML
INJECTION, SOLUTION INTRAMUSCULAR; INTRAVENOUS PRN
Status: DISCONTINUED | OUTPATIENT
Start: 2023-01-05 | End: 2023-01-05 | Stop reason: SDUPTHER

## 2023-01-05 RX ORDER — ONDANSETRON 2 MG/ML
4 INJECTION INTRAMUSCULAR; INTRAVENOUS
Status: DISCONTINUED | OUTPATIENT
Start: 2023-01-05 | End: 2023-01-05 | Stop reason: HOSPADM

## 2023-01-05 RX ORDER — SODIUM CHLORIDE 9 MG/ML
INJECTION, SOLUTION INTRAVENOUS PRN
Status: DISCONTINUED | OUTPATIENT
Start: 2023-01-05 | End: 2023-01-05 | Stop reason: HOSPADM

## 2023-01-05 RX ORDER — SODIUM CHLORIDE 0.9 % (FLUSH) 0.9 %
5-40 SYRINGE (ML) INJECTION PRN
Status: DISCONTINUED | OUTPATIENT
Start: 2023-01-05 | End: 2023-01-07 | Stop reason: HOSPADM

## 2023-01-05 RX ORDER — MIDAZOLAM HYDROCHLORIDE 1 MG/ML
INJECTION INTRAMUSCULAR; INTRAVENOUS PRN
Status: DISCONTINUED | OUTPATIENT
Start: 2023-01-05 | End: 2023-01-05 | Stop reason: SDUPTHER

## 2023-01-05 RX ORDER — SODIUM CHLORIDE 9 MG/ML
25 INJECTION, SOLUTION INTRAVENOUS PRN
Status: DISCONTINUED | OUTPATIENT
Start: 2023-01-05 | End: 2023-01-05 | Stop reason: HOSPADM

## 2023-01-05 RX ORDER — SODIUM CHLORIDE 0.9 % (FLUSH) 0.9 %
5-40 SYRINGE (ML) INJECTION EVERY 12 HOURS SCHEDULED
Status: DISCONTINUED | OUTPATIENT
Start: 2023-01-05 | End: 2023-01-07 | Stop reason: HOSPADM

## 2023-01-05 RX ORDER — LANOLIN ALCOHOL/MO/W.PET/CERES
3 CREAM (GRAM) TOPICAL ONCE
Status: COMPLETED | OUTPATIENT
Start: 2023-01-05 | End: 2023-01-05

## 2023-01-05 RX ORDER — INSULIN LISPRO 100 [IU]/ML
0-4 INJECTION, SOLUTION INTRAVENOUS; SUBCUTANEOUS
Status: DISCONTINUED | OUTPATIENT
Start: 2023-01-05 | End: 2023-01-07 | Stop reason: HOSPADM

## 2023-01-05 RX ORDER — LIDOCAINE HYDROCHLORIDE 20 MG/ML
INJECTION, SOLUTION EPIDURAL; INFILTRATION; INTRACAUDAL; PERINEURAL PRN
Status: DISCONTINUED | OUTPATIENT
Start: 2023-01-05 | End: 2023-01-05 | Stop reason: SDUPTHER

## 2023-01-05 RX ORDER — PHENYLEPHRINE HCL IN 0.9% NACL 1 MG/10 ML
SYRINGE (ML) INTRAVENOUS PRN
Status: DISCONTINUED | OUTPATIENT
Start: 2023-01-05 | End: 2023-01-05 | Stop reason: SDUPTHER

## 2023-01-05 RX ORDER — SODIUM CHLORIDE, SODIUM LACTATE, POTASSIUM CHLORIDE, CALCIUM CHLORIDE 600; 310; 30; 20 MG/100ML; MG/100ML; MG/100ML; MG/100ML
INJECTION, SOLUTION INTRAVENOUS CONTINUOUS
Status: DISCONTINUED | OUTPATIENT
Start: 2023-01-05 | End: 2023-01-05 | Stop reason: HOSPADM

## 2023-01-05 RX ORDER — LABETALOL HYDROCHLORIDE 5 MG/ML
10 INJECTION, SOLUTION INTRAVENOUS
Status: DISCONTINUED | OUTPATIENT
Start: 2023-01-05 | End: 2023-01-05 | Stop reason: HOSPADM

## 2023-01-05 RX ORDER — OXYCODONE HYDROCHLORIDE 5 MG/1
5 TABLET ORAL EVERY 4 HOURS PRN
Status: DISCONTINUED | OUTPATIENT
Start: 2023-01-05 | End: 2023-01-07 | Stop reason: HOSPADM

## 2023-01-05 RX ORDER — PROPOFOL 10 MG/ML
INJECTION, EMULSION INTRAVENOUS PRN
Status: DISCONTINUED | OUTPATIENT
Start: 2023-01-05 | End: 2023-01-05 | Stop reason: SDUPTHER

## 2023-01-05 RX ORDER — ROCURONIUM BROMIDE 10 MG/ML
INJECTION, SOLUTION INTRAVENOUS PRN
Status: DISCONTINUED | OUTPATIENT
Start: 2023-01-05 | End: 2023-01-05 | Stop reason: SDUPTHER

## 2023-01-05 RX ORDER — ROPIVACAINE HYDROCHLORIDE 5 MG/ML
INJECTION, SOLUTION EPIDURAL; INFILTRATION; PERINEURAL
Status: COMPLETED | OUTPATIENT
Start: 2023-01-05 | End: 2023-01-05

## 2023-01-05 RX ORDER — ONDANSETRON 2 MG/ML
INJECTION INTRAMUSCULAR; INTRAVENOUS PRN
Status: DISCONTINUED | OUTPATIENT
Start: 2023-01-05 | End: 2023-01-05 | Stop reason: SDUPTHER

## 2023-01-05 RX ORDER — ACETAMINOPHEN 325 MG/1
650 TABLET ORAL EVERY 6 HOURS
Status: DISCONTINUED | OUTPATIENT
Start: 2023-01-05 | End: 2023-01-07 | Stop reason: HOSPADM

## 2023-01-05 RX ORDER — OXYCODONE HYDROCHLORIDE 10 MG/1
10 TABLET ORAL EVERY 4 HOURS PRN
Status: DISCONTINUED | OUTPATIENT
Start: 2023-01-05 | End: 2023-01-07 | Stop reason: HOSPADM

## 2023-01-05 RX ORDER — INSULIN LISPRO 100 [IU]/ML
0-4 INJECTION, SOLUTION INTRAVENOUS; SUBCUTANEOUS NIGHTLY
Status: DISCONTINUED | OUTPATIENT
Start: 2023-01-05 | End: 2023-01-07 | Stop reason: HOSPADM

## 2023-01-05 RX ORDER — DEXAMETHASONE SODIUM PHOSPHATE 4 MG/ML
INJECTION, SOLUTION INTRA-ARTICULAR; INTRALESIONAL; INTRAMUSCULAR; INTRAVENOUS; SOFT TISSUE PRN
Status: DISCONTINUED | OUTPATIENT
Start: 2023-01-05 | End: 2023-01-05 | Stop reason: SDUPTHER

## 2023-01-05 RX ADMIN — ACETAMINOPHEN 650 MG: 325 TABLET ORAL at 18:48

## 2023-01-05 RX ADMIN — Medication 100 MCG: at 11:37

## 2023-01-05 RX ADMIN — CEFAZOLIN 3000 MG: 10 INJECTION, POWDER, FOR SOLUTION INTRAVENOUS at 20:35

## 2023-01-05 RX ADMIN — Medication 100 MCG: at 10:29

## 2023-01-05 RX ADMIN — Medication 3 MG: at 22:45

## 2023-01-05 RX ADMIN — EPHEDRINE SULFATE 10 MG: 50 INJECTION INTRAVENOUS at 11:50

## 2023-01-05 RX ADMIN — Medication 100 MCG: at 11:43

## 2023-01-05 RX ADMIN — ONDANSETRON 4 MG: 2 INJECTION INTRAMUSCULAR; INTRAVENOUS at 11:44

## 2023-01-05 RX ADMIN — Medication 100 MCG: at 11:29

## 2023-01-05 RX ADMIN — INSULIN LISPRO 4 UNITS: 100 INJECTION, SOLUTION INTRAVENOUS; SUBCUTANEOUS at 20:30

## 2023-01-05 RX ADMIN — LIDOCAINE HYDROCHLORIDE 2 ML: 20 INJECTION, SOLUTION EPIDURAL; INFILTRATION; INTRACAUDAL; PERINEURAL at 10:25

## 2023-01-05 RX ADMIN — ASPIRIN 325 MG: 325 TABLET, COATED ORAL at 20:24

## 2023-01-05 RX ADMIN — ACETAMINOPHEN 1000 MG: 500 TABLET ORAL at 06:56

## 2023-01-05 RX ADMIN — EPHEDRINE SULFATE 10 MG: 50 INJECTION INTRAVENOUS at 11:45

## 2023-01-05 RX ADMIN — DEXMEDETOMIDINE 12 MCG: 100 INJECTION, SOLUTION INTRAVENOUS at 10:11

## 2023-01-05 RX ADMIN — Medication 100 MCG: at 11:19

## 2023-01-05 RX ADMIN — Medication 100 MCG: at 10:55

## 2023-01-05 RX ADMIN — SUGAMMADEX 50 MG: 100 INJECTION, SOLUTION INTRAVENOUS at 11:59

## 2023-01-05 RX ADMIN — Medication 100 MCG: at 11:40

## 2023-01-05 RX ADMIN — CEFAZOLIN 3000 MG: 10 INJECTION, POWDER, FOR SOLUTION INTRAVENOUS at 10:26

## 2023-01-05 RX ADMIN — FENTANYL CITRATE 50 MCG: 50 INJECTION, SOLUTION INTRAMUSCULAR; INTRAVENOUS at 12:55

## 2023-01-05 RX ADMIN — SODIUM CHLORIDE, POTASSIUM CHLORIDE, SODIUM LACTATE AND CALCIUM CHLORIDE: 600; 310; 30; 20 INJECTION, SOLUTION INTRAVENOUS at 11:20

## 2023-01-05 RX ADMIN — MIDAZOLAM 2 MG: 1 INJECTION INTRAMUSCULAR; INTRAVENOUS at 10:12

## 2023-01-05 RX ADMIN — FENTANYL CITRATE 100 MCG: 50 INJECTION, SOLUTION INTRAMUSCULAR; INTRAVENOUS at 10:25

## 2023-01-05 RX ADMIN — Medication 100 MCG: at 11:16

## 2023-01-05 RX ADMIN — PROPOFOL 150 MG: 10 INJECTION, EMULSION INTRAVENOUS at 10:25

## 2023-01-05 RX ADMIN — ROCURONIUM BROMIDE 50 MG: 10 INJECTION INTRAVENOUS at 10:25

## 2023-01-05 RX ADMIN — Medication 100 MCG: at 11:17

## 2023-01-05 RX ADMIN — FENTANYL CITRATE 50 MCG: 50 INJECTION, SOLUTION INTRAMUSCULAR; INTRAVENOUS at 12:39

## 2023-01-05 RX ADMIN — SUGAMMADEX 150 MG: 100 INJECTION, SOLUTION INTRAVENOUS at 12:02

## 2023-01-05 RX ADMIN — Medication 100 MCG: at 11:33

## 2023-01-05 RX ADMIN — SODIUM CHLORIDE, POTASSIUM CHLORIDE, SODIUM LACTATE AND CALCIUM CHLORIDE: 600; 310; 30; 20 INJECTION, SOLUTION INTRAVENOUS at 06:56

## 2023-01-05 RX ADMIN — TRANEXAMIC ACID 1000 MG: 10 INJECTION, SOLUTION INTRAVENOUS at 10:31

## 2023-01-05 RX ADMIN — Medication 50 MG: at 10:44

## 2023-01-05 RX ADMIN — DEXMEDETOMIDINE 8 MCG: 100 INJECTION, SOLUTION INTRAVENOUS at 10:26

## 2023-01-05 RX ADMIN — ROPIVACAINE HYDROCHLORIDE 30 ML: 5 INJECTION, SOLUTION EPIDURAL; INFILTRATION; PERINEURAL at 10:12

## 2023-01-05 RX ADMIN — SODIUM CHLORIDE, POTASSIUM CHLORIDE, SODIUM LACTATE AND CALCIUM CHLORIDE: 600; 310; 30; 20 INJECTION, SOLUTION INTRAVENOUS at 13:00

## 2023-01-05 RX ADMIN — DEXAMETHASONE SODIUM PHOSPHATE 8 MG: 4 INJECTION, SOLUTION INTRAMUSCULAR; INTRAVENOUS at 10:25

## 2023-01-05 ASSESSMENT — PAIN DESCRIPTION - DESCRIPTORS
DESCRIPTORS: STABBING
DESCRIPTORS: DISCOMFORT
DESCRIPTORS: DISCOMFORT

## 2023-01-05 ASSESSMENT — PAIN - FUNCTIONAL ASSESSMENT
PAIN_FUNCTIONAL_ASSESSMENT: PREVENTS OR INTERFERES SOME ACTIVE ACTIVITIES AND ADLS
PAIN_FUNCTIONAL_ASSESSMENT: ACTIVITIES ARE NOT PREVENTED
PAIN_FUNCTIONAL_ASSESSMENT: 0-10
PAIN_FUNCTIONAL_ASSESSMENT: PREVENTS OR INTERFERES SOME ACTIVE ACTIVITIES AND ADLS

## 2023-01-05 ASSESSMENT — PAIN DESCRIPTION - ORIENTATION
ORIENTATION: LEFT

## 2023-01-05 ASSESSMENT — ENCOUNTER SYMPTOMS
SHORTNESS OF BREATH: 0
NAUSEA: 0
DIARRHEA: 0
COUGH: 0
VOMITING: 0
EYE REDNESS: 0

## 2023-01-05 ASSESSMENT — PAIN DESCRIPTION - FREQUENCY
FREQUENCY: CONTINUOUS
FREQUENCY: CONTINUOUS

## 2023-01-05 ASSESSMENT — PAIN SCALES - GENERAL
PAINLEVEL_OUTOF10: 3
PAINLEVEL_OUTOF10: 0
PAINLEVEL_OUTOF10: 7
PAINLEVEL_OUTOF10: 9

## 2023-01-05 ASSESSMENT — PAIN DESCRIPTION - LOCATION
LOCATION: KNEE

## 2023-01-05 ASSESSMENT — PAIN DESCRIPTION - PAIN TYPE
TYPE: SURGICAL PAIN
TYPE: SURGICAL PAIN

## 2023-01-05 ASSESSMENT — PAIN DESCRIPTION - ONSET
ONSET: ON-GOING
ONSET: ON-GOING

## 2023-01-05 NOTE — CONSULTS
[address], 1970, 1107/1107-A, 1/5/2023    History  [place]:  The encounter diagnosis was Pre-op testing. Patient  has a past medical history of Arthritis, Cancer ([address] Diabetes ([facility] [address] [facility] Endometrial cancer ([facility] [address] [facility] H/O cardiovascular stress test, H/O echocardiogram, and Ovarian cancer ([facility] [address] Patient  has a past surgical history that includes Hysterectomy; Tonsillectomy; Colonoscopy (N/A, 1/9/2020); Upper gastrointestinal endoscopy (N/A, 1/9/2020); and Ovary removal.    Subjective:  Patient states:  \"I think that block is still there. \"   Pain:  4/10 pain in L LE at knee at sx site. Communication with other providers:  Handoff to RN, OT  Restrictions: WBAT L LE, general precautions, fall risk    Home Setup/Prior level of function  Social/Functional History  Lives With: Alone (mom lives next door and able to help)  Type of Home: Apartment  Home Layout: One level  Home Access: Level entry  Bathroom Shower/Tub: Tub/Shower unit  Bathroom Toilet: Standard  Bathroom Equipment: Grab bars in [address]: Cane (independent without AD inside, mod I with SPC in community)  ADL Assistance: Independent  Homemaking Assistance: Independent  Homemaking Responsibilities: Yes  Ambulation Assistance: Independent  Transfer Assistance: Independent  Active : Yes  Occupation: Full time employment  Type of Occupation: Special  at [employer]    Examination of body systems (includes body structures/functions, activity/participation limitations):  Observation:  pt supine in bed upon arrival and agreeable to therapy  Vision:  WFL  Hearing:  WFL  Cardiopulmonary:  no O2 needs  Cognition: WFL, see OT/SLP note for further evaluation. Musculoskeletal  AROM unable to formally test, pt unable to move L knee against gravity at this time    Strength R LE 5/5, L LE 2+/5  significant impairment in function and endurance.     Neuro:  numbness L knee to ankle      Mobility:  Rolling L/R:  SBA  Supine to sit:  SBA with increased time to complete and cues for sequencing  Transfers: pt completed STS from EOB x2 trials min A with cues for hand placement and sequencing. Pt performed stand pivot transfer from bed to [facility] and from [facility] to chair max A with cues for sequencing and hand placement. Sitting balance:  good. Standing balance:  fair+, pt stood at [facility] CGA but with any Wb'ing through L LE, buckling would occur. Gait: deferred due to safety    [facility] 6 Clicks Inpatient Mobility:  AM-PAC Inpatient Mobility Raw Score : 13    Safety: patient left in chair with table in front of pt, call light within reach, RN notified, gait belt used. Assessment:  Pt is a 46 y.o. female admitted to the hospital for L TKA on 1/5/2023. Pt is typically independent with all ambulation and transfers with PRN use of SPC. Pt is currently performing bed mobility SBA, transferring max A, and unable to ambulate at this time. Pt is presenting with decreased endurance, impaired bed mobility, impaired transfers, impaired gait, decreased strength, and impaired ROM. Pt would benefit from continued acute care PT as well as ARU placement upon discharge to continue to address impairments. Anticipate functional mobility improvement as sensation returns. If pt performs well tomorrow with PT, may change recommendation to [facility]. Complexity: moderate    Prognosis: Good, no significant barriers to participation at this time.      General Plan:  (BID)/week     Equipment: Pt will need bariatric RW upon discharge    Goals:  Short Term Goals  Time Frame for Short Term Goals: 1 week  Short Term Goal 1: Pt to complete all bed mobility mod I  Short Term Goal 2: Pt to complete all STS transfers to/from bed, commode, and chair mod I  Short Term Goal 3: Pt to ambulate 48' with LRAD mod I       Treatment plan:  Bed mobility, transfers, balance, gait, TA, TX    Recommendations for NURSING mobility: stand pivot to right x2 assist with gait belt    Time:   Time in: 1411  Time out: 1442  Timed treatment minutes: 15  Total time: 30    Electronically signed by:    Nidia Barber, PT  1/5/2023, 4:03 PM

## 2023-01-05 NOTE — ANESTHESIA PROCEDURE NOTES
Peripheral Block    Patient location during procedure: procedure area  Reason for block: procedure for pain, post-op pain management and at surgeon's request  Start time: 1/5/2023 10:12 AM  End time: 1/5/2023 10:15 AM  Staffing  Performed: resident/CRNA   Anesthesiologist: Young Marsh MD  Resident/CRNA: ADRIANE Katz - CRNA  Preanesthetic Checklist  Completed: patient identified, IV checked, site marked, risks and benefits discussed, surgical/procedural consents, equipment checked, pre-op evaluation, timeout performed, anesthesia consent given, oxygen available, monitors applied/VS acknowledged, fire risk safety assessment completed and verbalized and blood product R/B/A discussed and consented  Peripheral Block   Patient position: supine  Prep: ChloraPrep  Provider prep: mask and sterile gloves  Patient monitoring: cardiac monitor, continuous pulse ox, continuous capnometry, frequent blood pressure checks and responsive to questions  Block type: Femoral  Adductor canal  Laterality: left  Injection technique: single-shot  Guidance: ultrasound guided  Local infiltration: lidocaine  Infiltration strength: 1 %  Local infiltration: lidocaine  Dose: 2 mL    Needle   Needle type: insulated echogenic nerve stimulator needle   Needle gauge: 20 G  Needle localization: ultrasound guidance  Needle length: 10 cm  Assessment   Injection assessment: negative aspiration for heme, no paresthesia on injection, local visualized surrounding nerve on ultrasound and no intravascular symptoms  Paresthesia pain: none  Slow fractionated injection: yes  Hemodynamics: stable  Real-time US image taken/store: yes  Outcomes: uncomplicated and patient tolerated procedure well    Medications Administered  ropivacaine (NAROPIN) injection 0.5% - Perineural   30 mL - 1/5/2023 10:12:00 AM

## 2023-01-05 NOTE — PROGRESS NOTES
Occupational Therapy    [facility] ACUTE CARE OCCUPATIONAL THERAPY EVALUATION    Louise Peoples, 1970, 1107/1107-A, 1/5/2023    Discharge Recommendation: Inpatient Rehabilitation      History:  [place]:  The encounter diagnosis was Pre-op testing. Subjective:  Patient states: \"I can't get this left knee straightened out! \"   Pain: Pt reported 4/10 surgical pain in Lt knee  Communication with other providers: PT SARAH Barr CM  Restrictions: General Precautions, Fall Risk, WBAT Lt LE, IV, Bed/chair alarm    Home Setup/Prior level of function:  Social/Functional History  Lives With: Alone (mom lives next door and able to help)  Type of Home: Apartment  Home Layout: One level  Home Access: Level entry  Bathroom Shower/Tub: Tub/Shower unit  Bathroom Toilet: Standard  Bathroom Equipment: Grab bars in [address] [place]: Cane  ADL Assistance: [address]: Independent  Homemaking Responsibilities: Yes  Ambulation Assistance: Independent (uses no AD in apartment, mod I with cane in community)  Transfer Assistance: Independent  Active : Yes  Occupation: Full time employment  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 Pleasant and agreeable to evaluation. Vision: WFL (Glasses)  Hearing: WFL  Vitals: Stable vitals throughout session on room air    Body Systems and functions:  ROM: WNL all joints in BL UEs  Strength: 4+/5 MMT all major muscle groups BL UEs  Sensation: WFL in BL UEs (See PT evaluation for LE assessment)  Tone: Normal  Coordination: WNL  Perception: WNL    Activities of Daily Living (ADLs):  Feeding: Independent   Grooming: SBA (seated hand hygiene with wet wipes on BSC after toileting; not feasible to complete in standing this date due to Lt LE weakness/knee buckling)  UB bathing: SBA   LB bathing:  Mod A (reaching distal BL LEs; would likely benefit from long handled sponge)  UB dressing: SBA (donning clean robe seated EOB)  LB dressing: Dependent (donning BL socks; would likely benefit from LB AE)  Toileting: Max A (pt urinated while seated on BSC; assist required for clothing mgmt both directions, able to complete seated crystal care without assist using wet wipes)    Cognitive and Psychosocial Functioning:  Overall cognitive status: WNL  Affect: Normal     Balance:   Sitting: SBA in unsupported sitting EOB and on BSC   Standing: CGA to Min A with RW; majority of weight through Rt LE and BL UEs due to Lt LE weakness/knee buckling    Functional Mobility:  Bed Mobility: SBA supine to sitting EOB (HOB elevated to 40', significantly increased time/effort required and cues for use of bed rail)  Transfers: Min A sit to stand from bed, Min A stand to sit to bed, Max A stand pivot transfer from bed to [facility] and from [facility] to chair (mod cues for technique/safe hand placement with each transfer)  Ambulation: Unsafe/unable this date due to Lt LE weakness/knee buckling      AM-PAC 6 click short form for inpatient daily activity:   How much help from another person does the patient currently need. .. Unable  Dep A Lot  Max A A Lot   Mod A A Little  Min A A Little   CGA  SBA None   Mod I  Indep  Sup   1. Putting on and taking off regular lower body clothing? [x] 1    [] 2   [] 2   [] 3   [] 3   [] 4      2. Bathing (including washing, rinsing, drying)? [] 1   [] 2   [x] 2 [] 3 [] 3 [] 4   3. Toileting, which includes using toilet, bedpan, or urinal? [] 1    [x] 2   [] 2   [] 3   [] 3   [] 4     4. Putting on and taking off regular upper body clothing? [] 1   [] 2   [] 2   [] 3   [x] 3    [] 4      5. Taking care of personal grooming such as brushing teeth? [] 1   [] 2    [] 2 [] 3    [x] 3   [] 4      6. Eating meals?    [] 1   [] 2   [] 2   [] 3   [] 3   [x] 4      Raw Score:  15   [24=0% impaired(CH), 23=1-19%(CI), 20-22=20-39%(CJ), 15-19=40-59%(CK), 10-14=60-79%(CL), 7-9=80-99%(CM), 6=100%(CN)]     Treatment:  Self Care Training:   Cues were given for safety, sequence, UE/LE placement, visual cues, and balance. Activities performed today included UB/LB dressing tasks, toileting on BSC, hand hygiene with wet wipes    Safety Measures: Gait belt used, Left in Chair, Alarm in place    Assessment:  Pt is a 46year old female with a past medical history of Arthritis, Cancer ([facility] Diabetes ([facility] Endometrial cancer ([facility] H/O cardiovascular stress test, H/O echocardiogram, and Ovarian cancer ([facility] Pt admitted with Lt knee DJD and underwent elective Lt TKA on 1-5 in the AM. Pt lives at home alone and at baseline is independent with ADLs, IADLs, ambulates mod I with a cane, drives, and works full time as a . Pt currently presents with the above impairments, and was significantly below baseline this date. Recommend continued OT services in inpatient rehab setting at discharge. Complexity: Moderate  Prognosis: Good  Plan: 3x/week    Goals:  1. Pt will complete all aspects of bed mobility for EOB/OOB ADLs mod I with HOB flat  2. Pt will complete UB/LB bathing CGA with setup using long handled sponge PRN  3. Pt will complete all aspects of LB dressing CGA with setup using AE PRN  4. Pt will complete all functional transfers to and from bed, chair, toilet, shower chair CGA/good safety awareness  5. Pt will ambulate HH distance to bathroom for toileting min A with RW   6. Pt will complete all aspects of toileting task CGA on regular toilet   7. Pt will complete oral hygiene/grooming routine in standing at sink CGA with setup/no seated rest breaks  8.  Pt will complete ther ex/ther act with focus on UE strengthening and functional standing tolerance >6 minutes     Time:   Time in: 1455  Time out: 1521  Timed treatment minutes: 11  Total time: 26    Electronically signed by:    RADHA Guerra/L, [place], .[id number]

## 2023-01-05 NOTE — CONSULTS
V2.0  [facility] Consult Note      Name:  Micheal Eid /Age/Sex: 1970  (46 y.o. female)   MRN & CSN:  [id number] & [id number] Encounter Date/Time: 2023 5:15 PM EST   [facility]: Leopold Searle, MD     Attending:Chester Jefferson DO  Consulting Provider: Chaya Hammans, APRN - [id number] [facility] Day: 1    Assessment and Recommendations   Micheal Eid is a 46 y.o. female with pmh of total knee replacement who presents with History of total left knee replacement    Hospital Problems             Last Modified POA    * (Principal) History of total left knee replacement 2023 Yes       Recommendations:    Primary osteoarthritis of left knee  Chronic pain of left knee  Status post left knee arthroscopy   Patient status post left knee TKA on 1 5 in the AM PER dR Stevens  PT OT out of bed. Postoperative care per orthopedic surgery. Case management following. As needed pain meds. Discharge planning. Noninsulin-dependent type 2 diabetes mellitus   sliding scale with hypoglycemia protocol   -Hold home oral antihyperglycemics, check hemoglobin A1c      Mixed hyperlipidemia   Resume home atorvastatin  Diet ADULT DIET; Regular   DVT Prophylaxis [] Lovenox, []  Heparin, [] SCDs, [] Ambulation,  [] Eliquis, [] Xarelto   Code Status Prior   Surrogate Decision Maker/ POA       History Obtained From:    patient, electronic medical record    History of Present Illness:     Chief Complaint: Left knee pain    Micheal Eid is a 46 y.o. female who is seen today by the hospitalist team at the request of Dr. Cindi Fuentes, with a Chief Complaint of left knee pain. Patient status post left knee TKA 2022 per Dr. Cindi Fuentes. Patient seen and examined postoperatively in MedSurg unit. Patient ambulating with physical therapy. Reports pain well controlled. Denies current issues.       Review of Systems:    Review of Systems   Constitutional:  Negative for activity change, appetite change, diaphoresis and fatigue. HENT:  Negative for congestion and postnasal drip. Eyes:  Negative for redness and visual disturbance. Respiratory:  Negative for cough and shortness of breath. Cardiovascular:  Negative for chest pain and palpitations. Gastrointestinal:  Negative for diarrhea, nausea and vomiting. Endocrine: Negative for cold intolerance and heat intolerance. Genitourinary:  Negative for difficulty urinating and dysuria. Musculoskeletal:  Positive for arthralgias and gait problem. Negative for joint swelling and neck pain. Skin: Negative. Neurological:  Negative for dizziness and speech difficulty. Psychiatric/Behavioral:  Negative for agitation and confusion. Objective: Intake/Output Summary (Last 24 hours) at 1/5/2023 1817  Last data filed at 1/5/2023 1217  Gross per 24 hour   Intake 1300 ml   Output 250 ml   Net 1050 ml      Vitals:   Vitals:    01/05/23 1400 01/05/23 1415 01/05/23 1440 01/05/23 1515   BP: 137/77 129/66 137/75    Pulse: 88 91 88    Resp: 16 14 14    Temp:  98 °F (36.7 °C)     TempSrc:  Temporal     SpO2: 97% 95% 94% 97%   Weight:       Height:           Medications Prior to Admission     Prior to Admission medications    Medication Sig Start Date End Date Taking? Authorizing Provider   TRULICITY 4.30 RC/2.0YN SOPN  9/3/22   Historical Provider, MD   metFORMIN (GLUCOPHAGE) 850 MG tablet 2 times daily 9/3/22   Historical Provider, MD   atorvastatin (LIPITOR) 20 MG tablet Take 20 mg by mouth daily 2/23/22 2/23/23  Historical Provider, MD   SITagliptin (JANUVIA) 50 MG tablet Take 50 mg by mouth daily  Patient not taking: No sig reported    Historical Provider, MD   pantoprazole (PROTONIX) 40 MG injection Infuse 40 mg intravenously daily    Historical Provider, MD   zolpidem (AMBIEN) 10 MG tablet Take 10 mg by mouth nightly as needed.   Patient not taking: No sig reported 7/3/19   Historical Provider, MD   glyBURIDE (DIABETA) 5 MG tablet 10 mg 2 times daily (with meals) 2/14/14   Historical Provider, MD       Physical Exam: Need 8 Elements   General: NAD  Eyes: EOMI  ENT: neck supple  Cardiovascular: Regular rate. Respiratory: Clear to auscultation  Gastrointestinal: Soft, non tender  Genitourinary: no suprapubic tenderness  Musculoskeletal: No edema left knee brace   Skin: warm, dry  Neuro: Alert. Psych: Mood appropriate. Past Medical History:   PMHx   Past Medical History:   Diagnosis Date    Arthritis     Cancer ([facility] [address]     [facility] treatment Ovarian and endometrial CA    Diabetes ([facility] [address]     Endometrial cancer ([facility] [address]     H/O cardiovascular stress test 3/17/2014    EF58% normal study    H/O echocardiogram 03/17/2014    EF 55%, normal LV systolic function, mildly dilated left atrium, normal sized right ventricle, normal valves, mild mitral and tricuspid regurg. Ovarian cancer ([facility] [address]      PSHX:  has a past surgical history that includes Hysterectomy; Tonsillectomy; Colonoscopy (N/A, 1/9/2020); Upper gastrointestinal endoscopy (N/A, 1/9/2020); and Ovary removal.  Allergies: No Known Allergies  Fam HX: family history includes Breast Cancer in her mother; Cancer in her father and maternal grandmother; Diabetes in her father, mother, paternal grandmother, and sister; Heart Attack in her paternal grandfather; Heart Disease in her father; Hypertension in her father.   Soc HX:   Social History     Socioeconomic History    Marital status: Single     Spouse name: None    Number of children: None    Years of education: None    Highest education level: None   Tobacco Use    Smoking status: Never     Passive exposure: Yes    Smokeless tobacco: Never   Vaping Use    Vaping Use: Never used   Substance and Sexual Activity    Alcohol use: No     Comment: caffeine 2 cans/bottles daily    Drug use: No       Medications:   Medications:    sodium chloride flush  5-40 mL IntraVENous 2 times per day    acetaminophen  650 mg Oral Q6H    ceFAZolin (ANCEF) IVPB  3,000 mg IntraVENous Q8H    aspirin  325 mg Oral BID    insulin lispro  0-4 Units SubCUTAneous TID WC    insulin lispro  0-4 Units SubCUTAneous Nightly    [START ON 1/6/2023] atorvastatin  20 mg Oral Daily      Infusions:    lactated ringers 125 mL/hr at 01/05/23 1300    sodium chloride      dextrose       PRN Meds: sodium chloride flush, 5-40 mL, PRN  sodium chloride, , PRN  ketorolac, 15 mg, Q6H PRN  oxyCODONE, 5 mg, Q4H PRN   Or  oxyCODONE, 10 mg, Q4H PRN  glucose, 4 tablet, PRN  dextrose bolus, 125 mL, PRN   Or  dextrose bolus, 250 mL, PRN  glucagon (rDNA), 1 mg, PRN  dextrose, , Continuous PRN      Labs and Imaging   XR KNEE LEFT (1-2 VIEWS)    Result Date: 1/5/2023  EXAMINATION: TWO XRAY VIEWS OF THE LEFT KNEE 1/5/2023 12:55 pm COMPARISON: Left knee radiograph October 24, 2022; HISTORY: ORDERING SYSTEM PROVIDED HISTORY: S/P L TKA TECHNOLOGIST PROVIDED HISTORY: Of operative side while in recovery room. Reason for exam:->S/P L TKA Reason for Exam: s/p left TKA FINDINGS: Two views of the left knee demonstrate postoperative changes of left total knee arthroplasty. Alignment is anatomic. Expected postoperative soft tissue and intra-articular gas present. No fracture identified. 1. Postoperative changes of left total knee arthroplasty with no evidence for immediate postoperative complication. CBC: No results for input(s): WBC, HGB, PLT in the last 72 hours. BMP:  No results for input(s): NA, K, CL, CO2, BUN, CREATININE, GLUCOSE in the last 72 hours. Hepatic: No results for input(s): AST, ALT, ALB, BILITOT, ALKPHOS in the last 72 hours. Lipids: No results found for: CHOL, HDL, TRIG  Hemoglobin A1C: No results found for: LABA1C  TSH: No results found for: TSH  Troponin: No results found for: TROPONINT  Lactic Acid: No results for input(s): LACTA in the last 72 hours. BNP: No results for input(s): PROBNP in the last 72 hours.   UA:  Lab Results   Component Value Date/Time    NITRU POSITIVE 12/01/2017 04:20 PM    COLORU LATA 12/01/2017 04:20 PM    WBCUA 28 12/01/2017 04:20 PM    RBCUA 8 12/01/2017 04:20 PM    MUCUS MODERATE 12/01/2017 04:20 PM    TRICHOMONAS NONE SEEN 12/01/2017 04:20 PM    BACTERIA MANY 12/01/2017 04:20 PM    CLARITYU SLIGHTLY CLOUDY 12/01/2017 04:20 PM    SPECGRAV 1.028 12/01/2017 04:20 PM    LEUKOCYTESUR MODERATE 12/01/2017 04:20 PM    UROBILINOGEN NORMAL 12/01/2017 04:20 PM    BILIRUBINUR NEGATIVE 12/01/2017 04:20 PM    BLOODU LARGE 12/01/2017 04:20 PM    KETUA SMALL 12/01/2017 04:20 PM     Urine Cultures: No results found for: Jessica Santos  Blood Cultures: No results found for: BC  No results found for: BLOODCULT2  Organism:   Lab Results   Component Value Date/Time    ORG ECOL 12/01/2017 04:20 PM       Personally reviewed Lab Studies, Imaging, and discussed with Dr Marty Villela     Electronically signed by ADRIANE Hadley CNP on 1/5/2023 at 6:17 PM

## 2023-01-05 NOTE — H&P
Subjective:      Patient ID: Micheal Eid is a 46 y.o. female. Patient presents to the office today for evaluation of the left knee. Patient states pain has been ongoing for several years now. Pt states pain is worse with prolong walking or standing. Pt states she was getting steroid injection by Ovidio CRAIG last injection given on 22. Pt states the injection does not help with her pain. She comes in today for her first visit in the regards to her left knee pain. She states that she has been dealing with deep, aching and grinding pain globally in her left knee for several years. She states that now the pain is gotten to the point that she is having difficulty walking or performing her daily activities. She has been continuing to attempt to work on weight loss but she is having difficulty because of the pain. She is very active and visiting and touring historical war sites and she is having difficulty with trips to [place] because of the pain. Review of Systems   Constitutional:  Negative for activity change, chills and fever. HENT:  Negative for congestion and drooling. Eyes:  Negative for redness. Respiratory:  Negative for chest tightness. Cardiovascular:  Negative for chest pain. Gastrointestinal:  Negative for abdominal pain. Endocrine: Negative for cold intolerance and heat intolerance. Musculoskeletal:  Positive for arthralgias, gait problem, joint swelling and myalgias. Negative for back pain. Skin:  Negative for color change, pallor, rash and wound. Neurological:  Negative for weakness and numbness. Psychiatric/Behavioral:  Negative for confusion.        Past Medical History        Past Medical History:   Diagnosis Date    Arthritis      Cancer [facility] treatment Ovarian and endometrial CA    Diabetes ([facility] [address]      Endometrial cancer ([facility] [address]      H/O cardiovascular stress test 3/17/2014     EF58% normal study    H/O echocardiogram 03/17/2014     EF 55%, normal LV systolic function, mildly dilated left atrium, normal sized right ventricle, normal valves, mild mitral and tricuspid regurg. Ovarian cancer ([address]              No current facility-administered medications on file prior to encounter. Current Outpatient Medications on File Prior to Encounter   Medication Sig Dispense Refill    TRULICITY 5.65 JA/0.1UT SOPN       metFORMIN (GLUCOPHAGE) 850 MG tablet 2 times daily      atorvastatin (LIPITOR) 20 MG tablet Take 20 mg by mouth daily      SITagliptin (JANUVIA) 50 MG tablet Take 50 mg by mouth daily (Patient not taking: No sig reported)      pantoprazole (PROTONIX) 40 MG injection Infuse 40 mg intravenously daily      zolpidem (AMBIEN) 10 MG tablet Take 10 mg by mouth nightly as needed. (Patient not taking: No sig reported)      glyBURIDE (DIABETA) 5 MG tablet 10 mg 2 times daily (with meals)       No Known Allergies  Past Surgical History:   Procedure Laterality Date    COLONOSCOPY N/A 1/9/2020    COLONOSCOPY WITH BIOPSY performed by Claudean Radish, MD at [address]      12/22/2017    OVARY REMOVAL      TONSILLECTOMY      UPPER GASTROINTESTINAL ENDOSCOPY N/A 1/9/2020    EGD BIOPSY performed by Claudean Radish, MD at [facility] ENDOSCOPY     Social History     Tobacco Use    Smoking status: Never     Passive exposure: Yes    Smokeless tobacco: Never   Vaping Use    Vaping Use: Never used   Substance Use Topics    Alcohol use: No     Comment: caffeine 2 cans/bottles daily    Drug use: No     Family History   Problem Relation Age of Onset    Diabetes Mother     Breast Cancer Mother     Heart Disease Father     Diabetes Father     Hypertension Father     Cancer Father     Diabetes Sister     Cancer Maternal Grandmother     Diabetes Paternal Grandmother     Heart Attack Paternal Grandfather        Objective:   Physical Exam  Constitutional:       Appearance: She is well-developed.    HENT:      Head: Normocephalic. Nose: No congestion. Eyes:      Pupils: Pupils are equal, round, and reactive to light. Pulmonary:      Effort: Pulmonary effort is normal.   Musculoskeletal:         General: Swelling and tenderness present. No deformity. Normal range of motion. Cervical back: Normal range of motion. Right hip: Normal.      Left hip: Normal.      Right knee: No swelling, deformity, effusion, erythema, ecchymosis, lacerations or bony tenderness. Normal range of motion. No tenderness. No medial joint line, lateral joint line, MCL, LCL or patellar tendon tenderness. No LCL laxity or MCL laxity. Normal alignment and normal patellar mobility. Left knee: Swelling, bony tenderness and crepitus present. No deformity, effusion, erythema, ecchymosis or lacerations. Normal range of motion. Tenderness present over the medial joint line, lateral joint line and patellar tendon. No MCL or LCL tenderness. No LCL laxity or MCL laxity. Normal alignment and normal patellar mobility. Skin:     General: Skin is warm and dry. Capillary Refill: Capillary refill takes less than 2 seconds. Coloration: Skin is not pale. Findings: No erythema or rash. Neurological:      Mental Status: She is alert and oriented to person, place, and time. Sensory: No sensory deficit. Motor: No weakness. Left knee-Skin intact with no erythema, ecchymosis or lacerations present. 0-120     XR KNEE LEFT (3 VIEWS)     Result Date: 10/24/2022  XRAY X-ray 3 views of the left knee obtained and reviewed by me today in the office demonstrates age appropriate bone density throughout with severe degenerative changes with medial patellofemoral joint space collapse, moderate osteophyte formation all 3 compartments with mild osteophyte formation of the posterior compartment, normal tracking patella, no acute osseous abnormalities. Impression: Severe degenerative changes of left knee as above with no acute process. /72   Pulse 80   Temp (!) 96 °F (35.6 °C) (Temporal)   Resp 18   Ht 5' 4\" (1.626 m)   Wt (!) 350 lb (158.8 kg)   SpO2 98%   BMI 60.08 kg/m²     Assessment:   Left knee OA, severe                Plan:   I discussed with her today her x-ray findings. I explained to her that she does have severe arthritis in the left knee. At this point given her persistent worsening symptoms despite conservative treatment and with her x-ray findings I recommend surgical treatment. I had a lengthy discussion with her today in regards to her left total knee replacement surgery. I explained risks, benefits, possible complications of the procedure and answered all questions for the patient. I explained postoperative rehabilitation protocol and expectations with the patient today. The patient understands and consents to the procedure. Patient will follow up with their primary care physician prior to surgical treatment for preoperative clearance. We will schedule surgery at soonest convenience. Continue weight-bearing as tolerated. Continue range of motion exercises as instructed. Ice and elevate as needed. Tylenol or Motrin for pain. Follow up in 3 weeks postop. She would like to schedule her surgery for January. She would like to have her surgery at [facility] and would like to do this as an outpatient and she would likely be a press-fit candidate. The patient was counseled at length about the risks of kingsley Covid-19 during their perioperative period and any recovery window from their procedure. The patient was made aware that kingsley Covid-19  may worsen their prognosis for recovering from their procedure  and lend to a higher morbidity and/or mortality risk. All material risks, benefits, and reasonable alternatives including postponing the procedure were discussed. The patient does wish to proceed with the procedure at this time. Pt seen and examined, No change in H+P. Edy 97, DO

## 2023-01-05 NOTE — OP NOTE
DATE OF PROCEDURE: 1/5/2023    PREOPERATIVE DIAGNOSIS:  Left knee DJD. POSTOPERATIVE DIAGNOSIS:  Left knee DJD. PROCEDURE:  Left total knee arthroplasty using [employer] Press-fit MC knee with size 9 femoral component, size E tibial component, size 29 patellar component and size 12 tibial poly component. SURGEON:  DO Jose Farris Blanks was present for the entirety of the procedure and vital for the performance of the procedure. Jose Blanks assisted with positioning, prepping, draping of the patient before the procedure and instrument manipulation, extremity repositioning and camera operation during the procedure as well as wound closure, dressing application and brace application after the procedure. Please note that no intern, resident, or other hospital staff was available to assist during the surgery. ANESTHESIA: General with regional block. ESTIMATED BLOOD LOSS:  300 mL. TOTAL TOURNIQUET TIME:  30 minutes. FLUIDS:  1200 mL of crystalloids. INDICATIONS FOR PROCEDURE:  The patient is a 68-year-old female with  long-standing history of left knee pain. For this, she underwent  conservative treatment with no relief of her symptoms. X-rays revealed  severe arthritis in the left knee. Given her persistent symptoms  despite conservative treatment and with her x-ray findings, I  recommended surgical treatment. I explained the risks, benefits and  possible complications of the procedure to the patient and after  answering all of her questions, she consented to undergo the above  procedure. REPORT OF PROCEDURE:  The patient was seen and evaluated in the  preoperative holding area where the left lower extremity was signed in  her presence. At this point, care of the patient was turned over to  anesthesia team who performed a regional block to the left lower  extremity. She was then transported back to the operative suite.   General  anesthesia was performed and once adequate anesthesia was obtained, the  left lower extremity was prepped and draped in usual sterile fashion. Preoperative antibiotics were administered, 1 gm of TXA was administered  IV. At this point, a time-out was performed and all in attendance were  in agreement. I exsanguinated the left lower extremity with the use of an Esmarch and  tourniquet was inflated to 280 mmHg. I then made a standard anterior  midline incision overlying the left knee and carried sharp dissection  down into the knee joint through a medial parapatellar incision. I then  debrided a significant amount of intra-articular fat pad and  inflammatory synovium. I then elevated the soft tissue off the proximal  and medial tibia with the use of Bovie. I then everted the patella and  flexed the knee at 90 degrees. I then debrided soft tissue around the  patella. I then used a saw to perform a freehand cut of the patella and  the bone fragment was then elevated and discarded. I then aligned a  sizing guide and found this to be a size 29 patella. I then drilled the  three drill holes. I then aligned a size 29 patella trial component,  which fit nicely on the patella. Additional osteophytes were then  debrided around the patellar component. The trial component was then  removed. I then turned my attention for preparation of the distal femur. I  placed retractors along the medial and lateral aspect of the distal  femur to protect the soft tissue. I then inserted the intramedullary  drill into the femoral canal.  I then inserted the intramedullary distal  femoral cutting guide, which was malleted in place and pinned in place. I then used a saw to perform the distal femoral cut resecting 10 mm of  distal femoral bone. The bone fragments were then discarded. The  distal femoral cutting guide was then removed. I then aligned the  sizing guide over the distal femur and found this to be a size 9 distal  femoral component.   I then drilled guide holes through the sizing guide,  which was then removed. I then inserted the size 9 distal femoral  cutting block, which was malleted in place and pinned in place. I then  used a saw to perform the anterior, posterior and chamfer cuts and the  bone fragments were then elevated and discarded. The distal femoral  cutting guide was then removed. I then debrided the ACL and PCL out of  the femoral notch. I then turned my attention for preparation of the proximal tibia. I  placed a retractor along the posterior tibia, translating it anteriorly  as well as retractors along the medial and lateral aspect of the  proximal tibia to protect the soft tissue. I then removed the remnant  of the medial and lateral meniscus ensuring to protect the popliteal  tendon and medial collateral ligament. Once I had adequate exposure of  the proximal tibia, I aligned the proximal tibial cutting guide and once  the appropriate position was obtained, it was pinned in place. I then  used a saw to perform the proximal tibial cut and the bone fragment was  then elevated and discarded. I then placed a lamina  in the  lateral compartment of the knee and used a curved osteotome and curved  curette to remove moderate-sized osteophytes off the posterior aspect of  the medial femoral condyle. I then placed a lamina  in the  medial compartment of the knee and used a curved osteotome and curved  curette to remove small osteophytes off the posterior aspect of the  lateral femoral condyle. I then translated the proximal tibia  anteriorly again. I then aligned a size E tibial base plate and once  the appropriate position was obtained, it was pinned in place. I then  inserted a size 9 distal femoral trial component, which was malleted in  place until it was firmly seated. I then inserted a trial size 12 tibial poly component and a  size 29 patellar component.   With all trial components in place, I ran  the knee through full range of motion and found there to be excellent  tracking of the patella with no laxity to varus or valgus stressing. These were then selected for the final implant components. Given the excellent bone quality and his age I decided to proceed with press-fit components. The trial components were then removed leaving the tibial base plate in  place. I then drilled and punched the 2 pegs for the proximal tibia and  the tibial base plate was then removed. The  bone ends were then irrigated with pulse lavage using sterile normal  saline with gentamicin, the bone ends were then dried. The final size E press-fit tibial component was then malleted in place until it was firmly seated. The final size 9 press-fit distal femoral component was then  malleted in place until it was firmly seated. I then everted the patella,  which was irrigated with pulse lavage and then dried. I them inserted the final size 29 patellar component which was then clamped in place until it was firmly seated. The tourniquet was then deflated for a total of 30 minutes and adequate hemostasis was maintained. The joint was then irrigated further with pulse lavage. I then injected 20 mL of Exparel diluted in 20 mL of saline around the capsule. I  then inserted the final size 12 MC tibial poly component, which was locked  in place. With all final components in place, I ran the knee through a  full range of motion and found there to be excellent tracking of the  patella with no laxity to varus or valgus stressing. The joint was then irrigated further with pulse lavage. I then re-approximated the medial  parapatellar incision using #5 Ethibond suture and #1 Vicryl suture in  figure-of-eight fashion. I then closed subcutaneous tissue using 2-0  Vicryl suture followed by skin closure with stratafix and prineo. Adequate  hemostasis was maintained at all times.   I then applied a sterile soft  dressing to the left lower extremity. The patient was then awakened  from anesthesia and transported to PACU in stable condition. She  appeared to have tolerated the procedure well. PROGNOSIS:  At this point, she will be admitted to the hospital for  postoperative pain control, rehabilitation and medical monitoring. Hospitalist will be consulted for medical management and physical  therapy will be consulted for gait training and she could be  weightbearing as tolerated on the left leg. She will receive antibiotic  prophylaxis and DVT prophylaxis during her hospitalization. Following  her discharge, I will see her back in the office in 3 weeks and will continue to monitor her progress in the outpatient setting for resolution of her symptoms. Jose Blanks was present for the entirety of the procedure and vital for the performance of the procedure. Jose Blanks assisted with positioning, prepping, draping of the patient before the procedure and instrument manipulation, extremity repositioning and camera operation during the procedure as well as wound closure, dressing application and brace application after the procedure. Please note that no intern, resident, or other hospital staff was available to assist during the surgery.       Edy 97, DO

## 2023-01-05 NOTE — ANESTHESIA POSTPROCEDURE EVALUATION
Department of Anesthesiology  Postprocedure Note    Patient: Bubba Lyons  MRN: [id number]  YOB: 1970  Date of evaluation: 1/5/2023      Procedure Summary     Date: 01/05/23 Room / Location: [address]    Anesthesia Start: 1022 Anesthesia Stop: 1217    Procedure: LEFT KNEE TOTAL ARTHROPLASTY (Left: Knee) Diagnosis:       Primary osteoarthritis of left knee      Chronic pain of left knee      (Primary osteoarthritis of left knee [M17.12])      (Chronic pain of left knee [M25.562, G89.29])    Surgeons: Kerri Hill DO Responsible Provider: Albania Daly MD    Anesthesia Type: general, regional ASA Status: 3          Anesthesia Type: No value filed.     Peter Phase I: Peter Score: 10    Peter Phase II:        Anesthesia Post Evaluation    Patient location during evaluation: PACU  Patient participation: complete - patient participated  Level of consciousness: sleepy but conscious  Pain score: 1  Airway patency: patent  Nausea & Vomiting: no nausea and no vomiting  Complications: no  Cardiovascular status: hemodynamically stable  Respiratory status: acceptable  Hydration status: euvolemic

## 2023-01-05 NOTE — BRIEF OP NOTE
Brief Postoperative Note      Patient: Cain Cox  YOB: 1970  MRN: [id number]    Date of Procedure: 1/5/2023    Pre-Op Diagnosis: Primary osteoarthritis of left knee [M17.12]  Chronic pain of left knee [M25.562, G89.29]    Post-Op Diagnosis: Same       Procedure(s):  LEFT KNEE TOTAL ARTHROPLASTY    Surgeon(s):  Jayesh Kan DO    Assistant:  Physician Assistant: Bakari Penn PA-C    Anesthesia: Spinal    Estimated Blood Loss (mL): 931     Complications: None    Specimens:   * No specimens in log *    Implants:  Implant Name Type Inv.  Item Serial No.  Lot No. LRB No. Used Action   NEXGEN POROUS PATELLA 10MX 29MM - [id number]  NEXGEN POROUS PATELLA 10MX 29MM  [facility] [id number] Left 1 Implanted   PSN TIB POR 2 PEG SZ E L - [id number]  PSN TIB POR 2 PEG SZ E L  [employer]- [id number] Left 1 Implanted   PSN FEM CR POR CCR STD SZ9 L - [id number]  PSN FEM CR POR CCR STD SZ9 L  [employer]- [id number] Left 1 Implanted   PSN MC VE ASF L 12MM 8-11 EF - [id number]  PSN MC VE ASF L 12MM 8-11 EF  [employer]- [id number] Left 1 Implanted         Drains: * No LDAs found *    Findings: Left knee OA    Electronically signed by Keith Rausch DO on 1/5/2023 at 11:43 AM

## 2023-01-05 NOTE — PROGRESS NOTES
1255- Patient requesting glasses from mother in waiting area, this RN went to waiting area and retrieved glasses and updated family. 1330- Patient resting quietly in bed, ice chips given, denies additional needs. Phase 1 care complete  1345- Patient mother called PACU from waiting area requesting to see daughter, updated on patient condition and explained that with the acuity and volume of patient in PACU we are currently unable to bring her back. Mother hung up phone on this RN while trying to explain. Patient updated on situation. 1400- Patient resting quietly in bed, given ice chips and updated on plan of care  1420- Patient IP room ready and placed on transport list. Patient appears in no acute distress, A+Ox4, respirations equal and unlabored on room air, denies needs, belongings on bed, glasses on face. Patient denies further needs and taken to 1107 by transporter.

## 2023-01-05 NOTE — PROGRESS NOTES
723 updated patient that there was going to be a delay for emergent case, pt and family verbalized understanding

## 2023-01-05 NOTE — PROGRESS NOTES
1213: Pt arrived to PACU from OR. Monitors applied, alarms on. Surgical dressing clean and dry. Ice applied. Pt states full feeling to toes, able to move heather lower ext. Report obtained from [facility] and [address] [place] [address] [address]. 1230: Radiology at bedside for post op xray. 1240: Pt turned and repositioned in bed. Medicated for pain 9/10 per pain scale. 1250: Pt tolerating ice chips at this time. 1255: Room assignment for 1107. Waiting on room to be cleaned at this time. Report called to [address] [place] [address] for room 4505. PACU care resumed by Silvia Camp RN. Report given bedside. All of pt belongings with pt. Pt mother updated in waiting room.

## 2023-01-06 LAB
GLUCOSE BLD-MCNC: 224 MG/DL (ref 70–99)
GLUCOSE BLD-MCNC: 231 MG/DL (ref 70–99)
GLUCOSE BLD-MCNC: 269 MG/DL (ref 70–99)
GLUCOSE BLD-MCNC: 307 MG/DL (ref 70–99)
HCT VFR BLD CALC: 31.5 % (ref 37–47)
HEMOGLOBIN: 10.1 GM/DL (ref 12.5–16)

## 2023-01-06 PROCEDURE — 97535 SELF CARE MNGMENT TRAINING: CPT

## 2023-01-06 PROCEDURE — 97530 THERAPEUTIC ACTIVITIES: CPT

## 2023-01-06 PROCEDURE — 6370000000 HC RX 637 (ALT 250 FOR IP): Performed by: NURSE PRACTITIONER

## 2023-01-06 PROCEDURE — 6370000000 HC RX 637 (ALT 250 FOR IP): Performed by: ORTHOPAEDIC SURGERY

## 2023-01-06 PROCEDURE — 6360000002 HC RX W HCPCS: Performed by: ORTHOPAEDIC SURGERY

## 2023-01-06 PROCEDURE — 1200000000 HC SEMI PRIVATE

## 2023-01-06 PROCEDURE — 82962 GLUCOSE BLOOD TEST: CPT

## 2023-01-06 PROCEDURE — 94761 N-INVAS EAR/PLS OXIMETRY MLT: CPT

## 2023-01-06 PROCEDURE — 85014 HEMATOCRIT: CPT

## 2023-01-06 PROCEDURE — 36415 COLL VENOUS BLD VENIPUNCTURE: CPT

## 2023-01-06 PROCEDURE — 85018 HEMOGLOBIN: CPT

## 2023-01-06 PROCEDURE — 97110 THERAPEUTIC EXERCISES: CPT

## 2023-01-06 PROCEDURE — 2580000003 HC RX 258: Performed by: ORTHOPAEDIC SURGERY

## 2023-01-06 PROCEDURE — 94150 VITAL CAPACITY TEST: CPT

## 2023-01-06 PROCEDURE — 97116 GAIT TRAINING THERAPY: CPT

## 2023-01-06 RX ADMIN — INSULIN LISPRO 3 UNITS: 100 INJECTION, SOLUTION INTRAVENOUS; SUBCUTANEOUS at 11:48

## 2023-01-06 RX ADMIN — ACETAMINOPHEN 650 MG: 325 TABLET ORAL at 21:34

## 2023-01-06 RX ADMIN — ACETAMINOPHEN 650 MG: 325 TABLET ORAL at 15:14

## 2023-01-06 RX ADMIN — OXYCODONE HYDROCHLORIDE 10 MG: 10 TABLET ORAL at 21:37

## 2023-01-06 RX ADMIN — ACETAMINOPHEN 650 MG: 325 TABLET ORAL at 03:25

## 2023-01-06 RX ADMIN — SODIUM CHLORIDE, POTASSIUM CHLORIDE, SODIUM LACTATE AND CALCIUM CHLORIDE: 600; 310; 30; 20 INJECTION, SOLUTION INTRAVENOUS at 21:40

## 2023-01-06 RX ADMIN — ASPIRIN 325 MG: 325 TABLET, COATED ORAL at 21:34

## 2023-01-06 RX ADMIN — INSULIN LISPRO 1 UNITS: 100 INJECTION, SOLUTION INTRAVENOUS; SUBCUTANEOUS at 09:09

## 2023-01-06 RX ADMIN — ATORVASTATIN CALCIUM 20 MG: 10 TABLET, FILM COATED ORAL at 09:09

## 2023-01-06 RX ADMIN — OXYCODONE HYDROCHLORIDE 10 MG: 10 TABLET ORAL at 17:03

## 2023-01-06 RX ADMIN — SODIUM CHLORIDE, PRESERVATIVE FREE 10 ML: 5 INJECTION INTRAVENOUS at 21:36

## 2023-01-06 RX ADMIN — SODIUM CHLORIDE, PRESERVATIVE FREE 10 ML: 5 INJECTION INTRAVENOUS at 09:09

## 2023-01-06 RX ADMIN — ACETAMINOPHEN 650 MG: 325 TABLET ORAL at 09:08

## 2023-01-06 RX ADMIN — CEFAZOLIN 3000 MG: 10 INJECTION, POWDER, FOR SOLUTION INTRAVENOUS at 03:25

## 2023-01-06 RX ADMIN — INSULIN LISPRO 2 UNITS: 100 INJECTION, SOLUTION INTRAVENOUS; SUBCUTANEOUS at 16:59

## 2023-01-06 RX ADMIN — ASPIRIN 325 MG: 325 TABLET, COATED ORAL at 09:08

## 2023-01-06 ASSESSMENT — PAIN SCALES - GENERAL
PAINLEVEL_OUTOF10: 7
PAINLEVEL_OUTOF10: 4
PAINLEVEL_OUTOF10: 7
PAINLEVEL_OUTOF10: 3
PAINLEVEL_OUTOF10: 0
PAINLEVEL_OUTOF10: 4
PAINLEVEL_OUTOF10: 7
PAINLEVEL_OUTOF10: 7

## 2023-01-06 ASSESSMENT — PAIN - FUNCTIONAL ASSESSMENT: PAIN_FUNCTIONAL_ASSESSMENT: PREVENTS OR INTERFERES WITH ALL ACTIVE AND SOME PASSIVE ACTIVITIES

## 2023-01-06 ASSESSMENT — PAIN DESCRIPTION - LOCATION
LOCATION: KNEE

## 2023-01-06 ASSESSMENT — PAIN DESCRIPTION - ORIENTATION
ORIENTATION: LEFT

## 2023-01-06 ASSESSMENT — PAIN DESCRIPTION - DESCRIPTORS
DESCRIPTORS: ACHING;SORE;DISCOMFORT;SHARP
DESCRIPTORS: THROBBING;STABBING
DESCRIPTORS: STABBING;THROBBING
DESCRIPTORS: SORE

## 2023-01-06 NOTE — PROGRESS NOTES
Chas Orourke is a 46 y.o. female patient. 1. Pre-op testing      Past Medical History:   Diagnosis Date    Arthritis     Cancer ([facility] [address]     [place] [facility] treatment Ovarian and endometrial CA    Diabetes ([facility] [address]     Endometrial cancer ([facility] [address]     H/O cardiovascular stress test 3/17/2014    EF58% normal study    H/O echocardiogram 03/17/2014    EF 55%, normal LV systolic function, mildly dilated left atrium, normal sized right ventricle, normal valves, mild mitral and tricuspid regurg. Ovarian cancer ([facility] [address]      No past surgical history pertinent negatives on file. Scheduled Meds:   sodium chloride flush  5-40 mL IntraVENous 2 times per day    acetaminophen  650 mg Oral Q6H    ceFAZolin (ANCEF) IVPB  3,000 mg IntraVENous Q8H    aspirin  325 mg Oral BID    insulin lispro  0-4 Units SubCUTAneous TID WC    insulin lispro  0-4 Units SubCUTAneous Nightly    [START ON 1/6/2023] atorvastatin  20 mg Oral Daily     Continuous Infusions:   lactated ringers 125 mL/hr at 01/05/23 1300    sodium chloride      dextrose       PRN Meds:sodium chloride flush, sodium chloride, ketorolac, oxyCODONE **OR** oxyCODONE, glucose, dextrose bolus **OR** dextrose bolus, glucagon (rDNA), dextrose    No Known Allergies  Principal Problem:    History of total left knee replacement  Resolved Problems:    * No resolved hospital problems. *    Blood pressure 137/75, pulse 88, temperature 98 °F (36.7 °C), temperature source Temporal, resp. rate 14, height 5' 4\" (1.626 m), weight (!) 350 lb (158.8 kg), SpO2 97 %. Subjective  Patient seen and examined, resting in bed comfortably, pain controlled, no new complaints. Objective  LLE - Dressing clean, dry, intact, no calf TTP, compartments soft, neurovascularly intact distally. Assessment & Plan  POD #0 L TKA, Doing well postoperatively    Continue weight bearing as tolerated. Continue range of motion exercises. Pain control. DVT prophylaxis. Continue to ice and elevate.   Continue PT/OT. Discharge planning for home tomorrow.     Edy 97, DO  1/5/2023

## 2023-01-06 NOTE — PROGRESS NOTES
V2.0  [facility] Hospitalist Progress Note      Name:  Aleks Tran /Age/Sex: 1970  (46 y.o. female)   MRN & CSN:  [id number] & [id number] Encounter Date/Time: 2023 2:44 PM EST    Location:  [address] [place] [address] PCP: Suszanne Habermann, MD       Hospital Day: 2    Assessment and Plan:   Aleks Tran is a 46 y.o. female with OA L knee      Plan:    Primary osteoarthritis of left knee  Chronic pain of left knee  Status post left knee arthroscopy             Patient status post left knee TKA on 1 5 in the AM PER dR Stevens  PT OT out of bed. Postoperative care per orthopedic surgery. Case management following. As needed pain meds. Discharge planning. Noninsulin-dependent type 2 diabetes mellitus             sliding scale with hypoglycemia protocol             -Hold home oral antihyperglycemics, check hemoglobin A1c    Mixed hyperlipidemia              Resume home atorvastatin    Ppx: Lovenox  Dispo: inpatient one more night per ortho recommendations    Subjective:     Chief Complaint: No chief complaint on file. No complaints. Needs to mobilize. Otherwise doing ok post-operatively. Review of Systems:    Review of Systems    10 point ROS negative except as stated above in \"subjective\" section    Objective:   No intake or output data in the 24 hours ending 23 1444     Vitals:   Vitals:    23 0807   BP: 129/69   Pulse: 80   Resp: 18   Temp: 97.5 °F (36.4 °C)   SpO2: 99%       Physical Exam:     General: NAD  Eyes: EOMI  ENT: neck supple  Cardiovascular: Regular rate. Respiratory: Clear to auscultation  Gastrointestinal: Soft, non tender  Genitourinary: no suprapubic tenderness  Musculoskeletal: No edema, L knee s/p TKA  Skin: warm, dry  Neuro: Alert. Psych: Mood appropriate.      Medications:   Medications:    sodium chloride flush  5-40 mL IntraVENous 2 times per day    acetaminophen  650 mg Oral Q6H    aspirin  325 mg Oral BID    insulin lispro  0-4 Units SubCUTAneous TID WC insulin lispro  0-4 Units SubCUTAneous Nightly    atorvastatin  20 mg Oral Daily      Infusions:    lactated ringers Stopped (01/06/23 1220)    sodium chloride      dextrose       PRN Meds: sodium chloride flush, 5-40 mL, PRN  sodium chloride, , PRN  ketorolac, 15 mg, Q6H PRN  oxyCODONE, 5 mg, Q4H PRN   Or  oxyCODONE, 10 mg, Q4H PRN  glucose, 4 tablet, PRN  dextrose bolus, 125 mL, PRN   Or  dextrose bolus, 250 mL, PRN  glucagon (rDNA), 1 mg, PRN  dextrose, , Continuous PRN        Labs      Recent Results (from the past 24 hour(s))   Hemoglobin A1c    Collection Time: 01/05/23  7:28 PM   Result Value Ref Range    Hemoglobin A1C 7.0 (H) 4.2 - 6.3 %    eAG 154 mg/dL   POCT Glucose    Collection Time: 01/05/23  8:15 PM   Result Value Ref Range    POC Glucose 315 (H) 70 - 99 MG/DL   Hemoglobin and hematocrit, blood    Collection Time: 01/06/23  3:51 AM   Result Value Ref Range    Hemoglobin 10.1 (L) 12.5 - 16.0 GM/DL    Hematocrit 31.5 (L) 37 - 47 %   POCT Glucose    Collection Time: 01/06/23  6:45 AM   Result Value Ref Range    POC Glucose 231 (H) 70 - 99 MG/DL   POCT Glucose    Collection Time: 01/06/23 10:51 AM   Result Value Ref Range    POC Glucose 307 (H) 70 - 99 MG/DL        Imaging/Diagnostics Last 24 Hours   XR KNEE LEFT (1-2 VIEWS)    Result Date: 1/5/2023  EXAMINATION: TWO XRAY VIEWS OF THE LEFT KNEE 1/5/2023 12:55 pm COMPARISON: Left knee radiograph October 24, 2022; HISTORY: ORDERING SYSTEM PROVIDED HISTORY: S/P L TKA TECHNOLOGIST PROVIDED HISTORY: Of operative side while in recovery room. Reason for exam:->S/P L TKA Reason for Exam: s/p left TKA FINDINGS: Two views of the left knee demonstrate postoperative changes of left total knee arthroplasty. Alignment is anatomic. Expected postoperative soft tissue and intra-articular gas present. No fracture identified. 1. Postoperative changes of left total knee arthroplasty with no evidence for immediate postoperative complication.        Electronically signed by Guillermina Landis MD on 1/6/2023 at 2:44 PM

## 2023-01-06 NOTE — PROGRESS NOTES
Physical Therapy    Physical Therapy Treatment Note  Name: Louise Peoples MRN: [id number] :   1970   Date:  2023   Admission Date: 2023 Room:  [address] [place] [address]   Restrictions/Precautions:                WBAT L LE, general precautions, fall risk  admitted to the hospital for L TKA on 2023  Communication with other providers:  per chart ok to tx  Subjective:  Patient states:  motivated and agreeable to tx. Pt's mother stopped by and very supportive. Pain:   Location, Type, Intensity (0/10 to 10/10):  no c/o during tx session   Objective:    Observation:  alert and oriented    Treatment, including education/measures:  Sit<=>stand from chair sba/cga but needing increased time and effort. Amb with rw 112' with sba. Pt has slow but steady gt. Pt given TKA booklet and reviewed  Ex in sitting and long sitting with written copy:   Trunk stretches with deep breathing  10 reps aps  10 reps quad sets  10 reps glut sets  10 reps heel slides with leg  assist to increase ROM  10 reps saqs  10 reps laqs with leg  to increase ROM  Safety  Patient left safely in the chair, with call light/phone in reach with alarm applied. Gait belt was used for transfers and gait. Assessment / Impression:      Patient's tolerance of treatment:  good   Adverse Reaction: na  Significant change in status and impact:  na  Barriers to improvement:  strength and safety  Plan for Next Session:    Cont.  POC  Time in:  1400  Time out:  1440  Timed treatment minutes:  40  Total treatment time:  36    Previously filed items:  Social/Functional History  Lives With: Alone (mom lives next door and able to help)  Type of Home: Apartment  Home Layout: One level  Home Access: Level entry  Bathroom Shower/Tub: Tub/Shower unit  Bathroom Toilet: Standard  Bathroom Equipment: Grab bars in [address]: Cane (independent without AD inside, mod I with SPC in community)  ADL Assistance: [address] [place] [address] Avenue: Independent  Homemaking Responsibilities: Yes  Ambulation Assistance: Independent  Transfer Assistance: Independent  Active : Yes  Occupation: Full time employment  Type of Occupation: Special  at [employer]        Short Term Goals  Time Frame for Short Term Goals: 1 week  Short Term Goal 1: Pt to complete all bed mobility mod I  Short Term Goal 2: Pt to complete all STS transfers to/from bed, commode, and chair mod I  Short Term Goal 3: Pt to ambulate 48' with LRAD mod I    Electronically signed by:    Torin Vazquez PTA  1/6/2023, 1:00 PM

## 2023-01-06 NOTE — CARE COORDINATION
Spoke with Fredia Cushing OT [facility] . He stated that therapy has upgraded therapy recommendations to  home with [facility]. Spoke with patient regarding discharge planning . Patient is from home alone and is normally independent . Patient has PCP and insurance to assist with RX coverage . CM discussed home with [facility] and patient is agreeable and has no preference on the agency. Patient stated that she has a cane and walker at home and denied having any other discharge needs.  Referral made to [facility] via [facility]

## 2023-01-06 NOTE — PROGRESS NOTES
4 Eyes Skin Assessment     NAME:  Cain Cox  YOB: 1970  MEDICAL RECORD NUMBER:  [id number]    The patient is being assessed for  Post-Op Surgical    I agree that One RN have performed a thorough Head to Toe Skin Assessment on the patient. ALL assessment sites listed below have been assessed. Areas assessed by both nurses:    Head, Face, Ears, Shoulders, Back, Chest, Arms, Elbows, Hands, Sacrum. Buttock, Coccyx, Ischium, and Legs. Feet and Heels        Does the Patient have a Wound?  No noted wound(s)       Fredi Prevention initiated by RN: No   Wound Care Orders initiated by RN: No    Pressure Injury (Stage 3,4, Unstageable, DTI, NWPT, and Complex wounds) if present place referral order by RN under : No    New and Established Ostomies, if present place, referral order under : No      Nurse 1 eSignature: Electronically signed by Taz Montes De Oca RN on 1/5/23 at 7:02 PM EST    **SHARE this note so that the co-signing nurse is able to place an eSignature**    Nurse 2 eSignature: Electronically signed by Long Quach RN on 1/5/23 at 7:03 PM EST

## 2023-01-06 NOTE — PROGRESS NOTES
Physical Therapy    Physical Therapy Treatment Note  Name: Laura Francois MRN: [id number] :   1970   Date:  2023   Admission Date: 2023 Room:  [address] [place] [address]   Restrictions/Precautions:          WBAT L LE, general precautions, fall risk  admitted to the hospital for L TKA on 2023  Communication with other providers:    Subjective:  Patient states:  agreeable to tx  Pain:   Location, Type, Intensity (0/10 to 10/10):  denies need for pain meds at start of tx  Objective:    Observation:  alert and oriented  Objective Measures:    Treatment, including education/measures:  Sup to sit with HOB up using bed rail sba but needing increased time and effort. Sit<=>stand from bed, commode, and chair min assist needing increased time and effort. Pt with independent with crystal care in sitting. Amb with rw 10' x 2, 25' x 1 cga progressing to sba. Pt has slow but steady gt with decreased stepping length. Ex in sup and sitting:  Trunk stretches with deep breathing ex  10 reps aps  10 reps quad sets  10 reps glut sets  10 reps laqs  5 reps SLRs. Safety  Patient left safely in the chair, with call light/phone in reach . Gait belt was used for transfers and gait. Assessment / Impression:      Patient's tolerance of treatment:  good   Adverse Reaction: na  Significant change in status and impact:  na  Barriers to improvement:  strength and safety  Plan for Next Session:    Cont.  POC  Time in:  0940  Time out:  1030  Timed treatment minutes:  50  Total treatment time:  48    Previously filed items:  Social/Functional History  Lives With: Alone (mom lives next door and able to help)  Type of Home: Apartment  Home Layout: One level  Home Access: Level entry  Bathroom Shower/Tub: Tub/Shower unit  Bathroom Toilet: Standard  Bathroom Equipment: Grab bars in [address]: Cane (independent without AD inside, mod I with SPC in community)  ADL Assistance: [facility] [address]: Independent  Homemaking Responsibilities: Yes  Ambulation Assistance: Independent  Transfer Assistance: Independent  Active : Yes  Occupation: Full time employment  Type of Occupation: Special  at [employer]        Short Term Goals  Time Frame for Short Term Goals: 1 week  Short Term Goal 1: Pt to complete all bed mobility mod I  Short Term Goal 2: Pt to complete all STS transfers to/from bed, commode, and chair mod I  Short Term Goal 3: Pt to ambulate 48' with LRAD mod I    Electronically signed by:    Austyn Ho PTA  1/6/2023, 8:38 AM

## 2023-01-06 NOTE — PROGRESS NOTES
Philip Anderson is a 46 y.o. female patient. 1. Pre-op testing      Past Medical History:   Diagnosis Date    Arthritis     Cancer ([facility] [address]     [place] network treatment Ovarian and endometrial CA    Diabetes ([facility] [address]     Endometrial cancer ([facility] [address]     H/O cardiovascular stress test 3/17/2014    EF58% normal study    H/O echocardiogram 03/17/2014    EF 55%, normal LV systolic function, mildly dilated left atrium, normal sized right ventricle, normal valves, mild mitral and tricuspid regurg. Ovarian cancer ([facility] [address]      No past surgical history pertinent negatives on file. Scheduled Meds:   sodium chloride flush  5-40 mL IntraVENous 2 times per day    acetaminophen  650 mg Oral Q6H    aspirin  325 mg Oral BID    insulin lispro  0-4 Units SubCUTAneous TID WC    insulin lispro  0-4 Units SubCUTAneous Nightly    atorvastatin  20 mg Oral Daily     Continuous Infusions:   lactated ringers 125 mL/hr at 01/05/23 1300    sodium chloride      dextrose       PRN Meds:sodium chloride flush, sodium chloride, ketorolac, oxyCODONE **OR** oxyCODONE, glucose, dextrose bolus **OR** dextrose bolus, glucagon (rDNA), dextrose    No Known Allergies  Principal Problem:    History of total left knee replacement  Resolved Problems:    * No resolved hospital problems. *    Blood pressure 129/69, pulse 80, temperature 97.5 °F (36.4 °C), temperature source Oral, resp. rate 18, height 5' 4\" (1.626 m), weight (!) 350 lb (158.8 kg), SpO2 99 %. Subjective  Patient seen and examined, resting in bed comfortably, pain controlled, no new complaints. Not yet able to walk in the hallway, progressing slowly with PT.    Objective:  Vital signs (most recent): Blood pressure 129/69, pulse 80, temperature 97.5 °F (36.4 °C), temperature source Oral, resp. rate 18, height 5' 4\" (1.626 m), weight (!) 350 lb (158.8 kg), SpO2 99 %. LLE - Dressing removed, incision clean, dry, intact, no calf TTP, compartments soft, neurovascularly intact distally.     Assessment & Plan  POD #1 L TKA, Doing well postoperatively    We will hold off on discharge today, pending improvement with PT. Continue weight bearing as tolerated. Continue range of motion exercises. Pain control. DVT prophylaxis. Continue to ice and elevate. Continue PT/OT. Discharge planning for home tomorrow.     Edy 97, DO  1/6/2023

## 2023-01-06 NOTE — PROGRESS NOTES
Occupational Therapy      Occupational Therapy Treatment Note    Name: Tennille Arroyo MRN: [id number] :   1970   Date:  2023   Admission Date: 2023 Room:  [address] [place] [address]     Primary Problem: Lt knee DJD s/p Lt TKA    Restrictions/Precautions: General Precautions, Fall Risk, WBAT Lt LE, IV, Bed/chair alarm     Communication with other providers: [facility]    Subjective:  Patient states: \"My mom has one of those tub benches that's never even been opened! \"   Pain: Pt reported 4/10 surgical pain in Lt knee    Objective:    Observation: Pt received sitting in chair upon OT arrival. Pleasant and agreeable to treatment. Objective Measures: A & O x 4    Treatment, including education:  Therapeutic Activity Training:   Therapeutic activity training was instructed today. Cues were given for safety, sequence, UE/LE placement, awareness, and balance. Self Care Training:   Cues were given for safety, sequence, UE/LE placement, visual cues, and balance. Pt received sitting in chair upon OT arrival. Pt re-educated on role of OT, POC, WBAT status Lt LE, and importance of OOB activity. Pt provided LB AE including reacher, sock aid, and long handled sponge. Pt provided education on use of all devices. Pt trialed reacher and able to doff BL socks at chair level SBA with min cues for technique. Pt trialed sock aid and donned BL socks at chair level SBA with min cues for technique. Pt completed sit to stand transfer from chair CGA with min cues for safe hand placement. Pt ambulated household distance to bathroom sink for ADLs CGA with RW. Pt stood at sink, and completed oral hygiene tasks of brushing/rinsing and facial hygiene in standing at sink SBA with min cues for safe RW placement at sink. Following ADLs at sink, pt ambulated from sink to chair CGA with RW. Pt transferred stand to sit to chair CGA with min cues for extending Lt LE with controlled descent and reaching back with arms.  Pt educated on obtaining tub transfer bench (voiced her mother has this piece of DME) and discussed initial use of bariatric RW. Pt educated on appropriate height of device. Pt left positioned for comfort in chair with all lines intact, all needs within reach, and chair alarm on. Assessment / Impression:    Patient's tolerance of treatment: Well  Adverse Reaction: None  Significant change in status and impact: Significantly improved from initial evaluation  Barriers to improvement: None noted    Plan for Next Session:    Continue per OT POC.  Would update discharge recommendation to home with initial assist PRN, HH OT, and a bariatric RW    Time in: 1300  Time out: 1325  Timed treatment minutes: 25  Total treatment time: 25    Electronically signed by:    Theresa Hinkle OTR/L, [address] [facility], [id number]

## 2023-01-07 VITALS
BODY MASS INDEX: 50.02 KG/M2 | WEIGHT: 293 LBS | OXYGEN SATURATION: 99 % | HEART RATE: 87 BPM | DIASTOLIC BLOOD PRESSURE: 54 MMHG | RESPIRATION RATE: 16 BRPM | TEMPERATURE: 97.3 F | HEIGHT: 64 IN | SYSTOLIC BLOOD PRESSURE: 120 MMHG

## 2023-01-07 LAB — GLUCOSE BLD-MCNC: 189 MG/DL (ref 70–99)

## 2023-01-07 PROCEDURE — 97116 GAIT TRAINING THERAPY: CPT

## 2023-01-07 PROCEDURE — 97530 THERAPEUTIC ACTIVITIES: CPT

## 2023-01-07 PROCEDURE — 2580000003 HC RX 258: Performed by: ORTHOPAEDIC SURGERY

## 2023-01-07 PROCEDURE — 6370000000 HC RX 637 (ALT 250 FOR IP): Performed by: NURSE PRACTITIONER

## 2023-01-07 PROCEDURE — 97110 THERAPEUTIC EXERCISES: CPT

## 2023-01-07 PROCEDURE — 6370000000 HC RX 637 (ALT 250 FOR IP): Performed by: ORTHOPAEDIC SURGERY

## 2023-01-07 PROCEDURE — 82962 GLUCOSE BLOOD TEST: CPT

## 2023-01-07 PROCEDURE — 94761 N-INVAS EAR/PLS OXIMETRY MLT: CPT

## 2023-01-07 PROCEDURE — 94150 VITAL CAPACITY TEST: CPT

## 2023-01-07 RX ORDER — OXYCODONE HYDROCHLORIDE 5 MG/1
5 TABLET ORAL EVERY 6 HOURS PRN
Qty: 28 TABLET | Refills: 0 | Status: SHIPPED | OUTPATIENT
Start: 2023-01-07 | End: 2023-01-14

## 2023-01-07 RX ADMIN — SODIUM CHLORIDE, PRESERVATIVE FREE 10 ML: 5 INJECTION INTRAVENOUS at 08:43

## 2023-01-07 RX ADMIN — OXYCODONE HYDROCHLORIDE 10 MG: 10 TABLET ORAL at 02:41

## 2023-01-07 RX ADMIN — ATORVASTATIN CALCIUM 20 MG: 10 TABLET, FILM COATED ORAL at 08:24

## 2023-01-07 RX ADMIN — ASPIRIN 325 MG: 325 TABLET, COATED ORAL at 08:24

## 2023-01-07 RX ADMIN — ACETAMINOPHEN 650 MG: 325 TABLET ORAL at 08:24

## 2023-01-07 RX ADMIN — SODIUM CHLORIDE, POTASSIUM CHLORIDE, SODIUM LACTATE AND CALCIUM CHLORIDE: 600; 310; 30; 20 INJECTION, SOLUTION INTRAVENOUS at 02:45

## 2023-01-07 RX ADMIN — ACETAMINOPHEN 650 MG: 325 TABLET ORAL at 02:41

## 2023-01-07 RX ADMIN — OXYCODONE HYDROCHLORIDE 10 MG: 10 TABLET ORAL at 13:51

## 2023-01-07 RX ADMIN — OXYCODONE HYDROCHLORIDE 10 MG: 10 TABLET ORAL at 08:24

## 2023-01-07 ASSESSMENT — PAIN SCALES - GENERAL
PAINLEVEL_OUTOF10: 7
PAINLEVEL_OUTOF10: 7
PAINLEVEL_OUTOF10: 5
PAINLEVEL_OUTOF10: 7
PAINLEVEL_OUTOF10: 5

## 2023-01-07 ASSESSMENT — PAIN DESCRIPTION - LOCATION
LOCATION: KNEE

## 2023-01-07 ASSESSMENT — PAIN DESCRIPTION - DESCRIPTORS
DESCRIPTORS: SHARP
DESCRIPTORS: ACHING;DISCOMFORT
DESCRIPTORS: SORE;ACHING

## 2023-01-07 ASSESSMENT — PAIN DESCRIPTION - ORIENTATION
ORIENTATION: LEFT

## 2023-01-07 ASSESSMENT — PAIN - FUNCTIONAL ASSESSMENT: PAIN_FUNCTIONAL_ASSESSMENT: ACTIVITIES ARE NOT PREVENTED

## 2023-01-07 NOTE — PROGRESS NOTES
V2.0  [facility] [place] [place] Hospitalist Progress Note      Name:  Emily Anderson /Age/Sex: 1970  (46 y.o. female)   MRN & CSN:  [id number] & [id number] Encounter Date/Time: 2023 2:44 PM EST    Location:  [address] [place] [address] PCP: Varghese Shore MD       Hospital Day: 3    Assessment and Plan:   Emily Anderson is a 46 y.o. female with OA L knee      Plan: discharge patient    Primary osteoarthritis of left knee  Chronic pain of left knee  Status post left knee arthroscopy             Patient status post left knee TKA on 1 5 in the AM PER dR Stevens  PT OT out of bed. Postoperative care per orthopedic surgery. Case management following. As needed pain meds. Discharge planning. Noninsulin-dependent type 2 diabetes mellitus             sliding scale with hypoglycemia protocol             -Hold home oral antihyperglycemics, check hemoglobin A1c    Mixed hyperlipidemia              Resume home atorvastatin    Ppx: Lovenox  Dispo: inpatient one more night per ortho recommendations    Subjective:     Chief Complaint: No chief complaint on file. No complaints. Working with PT. Review of Systems:    Review of Systems    10 point ROS negative except as stated above in \"subjective\" section    Objective: Intake/Output Summary (Last 24 hours) at 2023 1118  Last data filed at 2023 0241  Gross per 24 hour   Intake 240 ml   Output --   Net 240 ml          Vitals:   Vitals:    23 0831   BP: (!) 120/54   Pulse: 87   Resp: 16   Temp: 97.3 °F (36.3 °C)   SpO2: 99%       Physical Exam:     General: NAD  Eyes: EOMI  ENT: neck supple  Cardiovascular: Regular rate. Respiratory: Clear to auscultation  Gastrointestinal: Soft, non tender  Genitourinary: no suprapubic tenderness  Musculoskeletal: No edema, L knee s/p TKA  Skin: warm, dry  Neuro: Alert. Psych: Mood appropriate.      Medications:   Medications:    sodium chloride flush  5-40 mL IntraVENous 2 times per day    acetaminophen  650 mg Oral Q6H aspirin  325 mg Oral BID    insulin lispro  0-4 Units SubCUTAneous TID WC    insulin lispro  0-4 Units SubCUTAneous Nightly    atorvastatin  20 mg Oral Daily      Infusions:    sodium chloride      dextrose       PRN Meds: sodium chloride flush, 5-40 mL, PRN  sodium chloride, , PRN  ketorolac, 15 mg, Q6H PRN  oxyCODONE, 5 mg, Q4H PRN   Or  oxyCODONE, 10 mg, Q4H PRN  glucose, 4 tablet, PRN  dextrose bolus, 125 mL, PRN   Or  dextrose bolus, 250 mL, PRN  glucagon (rDNA), 1 mg, PRN  dextrose, , Continuous PRN      Labs      Recent Results (from the past 24 hour(s))   POCT Glucose    Collection Time: 01/06/23  3:50 PM   Result Value Ref Range    POC Glucose 224 (H) 70 - 99 MG/DL   POCT Glucose    Collection Time: 01/06/23  8:45 PM   Result Value Ref Range    POC Glucose 269 (H) 70 - 99 MG/DL   POCT Glucose    Collection Time: 01/07/23  6:19 AM   Result Value Ref Range    POC Glucose 189 (H) 70 - 99 MG/DL        Imaging/Diagnostics Last 24 Hours   XR KNEE LEFT (1-2 VIEWS)    Result Date: 1/5/2023  EXAMINATION: TWO XRAY VIEWS OF THE LEFT KNEE 1/5/2023 12:55 pm COMPARISON: Left knee radiograph October 24, 2022; HISTORY: ORDERING SYSTEM PROVIDED HISTORY: S/P L TKA TECHNOLOGIST PROVIDED HISTORY: Of operative side while in recovery room. Reason for exam:->S/P L TKA Reason for Exam: s/p left TKA FINDINGS: Two views of the left knee demonstrate postoperative changes of left total knee arthroplasty. Alignment is anatomic. Expected postoperative soft tissue and intra-articular gas present. No fracture identified. 1. Postoperative changes of left total knee arthroplasty with no evidence for immediate postoperative complication.        Electronically signed by Alejandra Manriquez MD on 1/7/2023 at 11:18 AM

## 2023-01-07 NOTE — PROGRESS NOTES
Physical Therapy    Physical Therapy Treatment Note  Name: Haroldo Gooden MRN: [id number] :   1970   Date:  2023   Admission Date: 2023 Room:  [address] [place] [address]   Restrictions/Precautions:        WBAT L LE, general precautions, fall risk  admitted to the hospital for L TKA on 2023  Communication with other providers:  per nurse pt is ok to treat  Subjective:  Patient states:  her Knee was sore when she walked to the bathroom with nursing earlier  Pain:   Location, Type, Intensity (0/10 to 10/10):  6/10 nursing just medicated pt  Objective:    Observation:  pt was in the bed with nursing taking her VS's  Treatment, including education/measures:  AROM of L knee in supine is 0-30 degrees  But in sitting after her walk was 0-65  Supine Exercises:pt needed extra time to complete  Ankle pumps 4 x 10  Quad sets x 10  Glute sets x 10  Heel slides x 10  SAQ's x 10  Sitting Exercises:  LAQ's x 10  Marching x 10   Therapeutic Exercise:  Therapeutic exercises were instructed today. Cues were given for technique, safety, recruitment, and rationale. Cues were verbal and/or tactile. Transfers  Rolling :SBA with time  Supine to sit : SBA with time  Scooting :SBA with time  Sit to stand :CGA to min A of 1  Stand to sit :CGA of 1  Gait:  Pt amb with HDRW for 70 ft with SBA  Pt needed VC's for PLB, rest as needed, proper gt sequence  Safety  Patient left safely in the chair, with call light/phone in reach with alarm applied. Gait belt was used for transfers and gait.   Assessment / Impression:     Patient's tolerance of treatment:  good pt did not need the leg  for ex or trans out of bed   Adverse Reaction: none  Significant change in status and impact:  improved distance for gt  Barriers to improvement:  pain and body habitus  Plan for Next Session:    Will cont to work towards pt's goals per her tolerance  Time in:  830  Time out:  930  Timed treatment minutes:  60  Total treatment time:  60  Previously filed items:  Social/Functional History  Lives With: Alone (mom lives next door and able to help)  Type of Home: Apartment  Home Layout: One level  Home Access: Level entry  Bathroom Shower/Tub: Tub/Shower unit  Bathroom Toilet: Standard  Bathroom Equipment: Grab bars in [address] (independent without AD inside, mod I with SPC in community)  ADL Assistance: Independent  Homemaking Assistance: Independent  Homemaking Responsibilities: Yes  Ambulation Assistance: Independent  Transfer Assistance: Independent  Active : Yes  Occupation: Full time employment  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

## 2023-01-07 NOTE — PROGRESS NOTES
Susan Carvajal is a 46 y.o. female patient. 1. Pre-op testing      Past Medical History:   Diagnosis Date    Arthritis     Cancer ([facility] [address]     [place] network treatment Ovarian and endometrial CA    Diabetes ([facility] [address]     Endometrial cancer ([facility] [address]     H/O cardiovascular stress test 3/17/2014    EF58% normal study    H/O echocardiogram 03/17/2014    EF 55%, normal LV systolic function, mildly dilated left atrium, normal sized right ventricle, normal valves, mild mitral and tricuspid regurg. Ovarian cancer ([facility] [address]      No past surgical history pertinent negatives on file. Scheduled Meds:   sodium chloride flush  5-40 mL IntraVENous 2 times per day    acetaminophen  650 mg Oral Q6H    aspirin  325 mg Oral BID    insulin lispro  0-4 Units SubCUTAneous TID WC    insulin lispro  0-4 Units SubCUTAneous Nightly    atorvastatin  20 mg Oral Daily     Continuous Infusions:   sodium chloride      dextrose       PRN Meds:sodium chloride flush, sodium chloride, ketorolac, oxyCODONE **OR** oxyCODONE, glucose, dextrose bolus **OR** dextrose bolus, glucagon (rDNA), dextrose    No Known Allergies  Principal Problem:    History of total left knee replacement  Resolved Problems:    * No resolved hospital problems. *    Blood pressure (!) 120/54, pulse 87, temperature 97.3 °F (36.3 °C), temperature source Oral, resp. rate 16, height 5' 4\" (1.626 m), weight (!) 350 lb (158.8 kg), SpO2 99 %. Subjective  Patient seen and examined, resting in bed comfortably, pain controlled, no new complaints. Doing much better with PT today, able to walk in the hallway, doing well. Objective:  Vital signs (most recent): Blood pressure (!) 120/54, pulse 87, temperature 97.3 °F (36.3 °C), temperature source Oral, resp. rate 16, height 5' 4\" (1.626 m), weight (!) 350 lb (158.8 kg), SpO2 99 %. LLE - Incision clean, dry, intact, no calf TTP, compartments soft, neurovascularly intact distally.     Assessment & Plan  POD #2 L TKA, Doing well postoperatively    Continue weight bearing as tolerated. Continue range of motion exercises. Pain control. DVT prophylaxis. Continue to ice and elevate. Continue PT/OT. Discharge home today.     Edy Charlton, DO  1/7/2023

## 2023-01-07 NOTE — PROGRESS NOTES
Physical Therapy  Name: Jonnie Mcarthur MRN: [id number] :   1970   Date:  2023   Admission Date: 2023 Room:  [address] [place] [address]   Restrictions/Precautions:         WBAT L LE, general precautions, fall risk  admitted to the hospital for L TKA on 2023  Communication with other providers:   RN states pt is ok to see for therapy  Subjective:  Patient states:  she is being picked up at 2 pm and needs to get dressed  Pain:   Location, Type, Intensity (0/10 to 10/10):  did not rate  Objective:    Observation:  pt was in the chair with her clothes on the bed and call light on  Treatment, including education/measures:  Assisted pt is using the reacher to get dressed with min A of 1 and VC's for technique  Transfers   Scooting :SBA  Sit to stand :SBA  Pt was able to pull up her underware and pants once standing. Pt cont to don her bra and top with SBA and good balance  Stand to sit :SBA  Nursing tech came in and helped pt pack  Safety  Patient left safely in the chair, with call light/phone in reach with nursing in the room.  Gait belt was used for transfers   Assessment / Impression:     Patient's tolerance of treatment:  good   Adverse Reaction: none  Significant change in status and impact:  none  Barriers to improvement:  pain  Plan for Next Session:    Will cont to work towards pt's goals per her tolerance  Time in:  1320  Time out:  1345  Timed treatment minutes:  25  Total treatment time:  25  Previously filed items:  Social/Functional History  Lives With: Alone (mom lives next door and able to help)  Type of Home: Apartment  Home Layout: One level  Home Access: Level entry  Bathroom Shower/Tub: Tub/Shower unit  Bathroom Toilet: Standard  Bathroom Equipment: Grab bars in [address] (independent without AD inside, mod I with SPC in community)  ADL Assistance: [address] [place] [address] Avenue: Independent  Homemaking Responsibilities: Yes  Ambulation Assistance: Independent  Transfer Assistance: Independent  Active : Yes  Occupation: Full time employment  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

## 2023-01-07 NOTE — PROGRESS NOTES
Called and faxed to [facility] [phone]) to notify pt was discharged. Also, [address] fax number is [phone]. Fax the AVS, d/c Summary if available & Inpatient Home Health Needs order to the above number. Fax was successful.

## 2023-01-07 NOTE — DISCHARGE SUMMARY
ADMIT DATE: 1/5/2023    DISCHARGE DATE: 1/7/2023    PROVIDER:     Jefry Minaya DO                      ADMISSION DIAGNOSIS:  Left knee DJD. DISCHARGE DIAGNOSIS:  Left knee DJD. PROCEDURE:  Left total knee arthroplasty on 1/5/2023     HOSPITAL COURSE:  The patient is a 46year-old female with a  longstanding history of left knee pain. For this, she was brought to  [facility] on 1/5/2023 where she underwent left   total knee arthroplasty. She was admitted postoperatively  for pain control, rehabilitation, and medical monitoring. Hospitalist  was consulted for medical management. Physical Therapy was consulted  for gait training. She did receive antibiotic prophylaxis and DVT  prophylaxis during her hospitalization. Throughout her hospital  course, clinically, she remained stable with no apparent medical  complications. She was progressing well with physical therapy and her  pain was well controlled with oral medications.  was  consulted for discharge planning. Given that clinically she was  stable, she was discharged to home on 1/7/2023. DISCHARGE MEDICATIONS:    1. Oxycodone 5 mg one p.o. q.6 h. p.r.n. Pain. 2.   mg p.o. b.i.d. for 14 days  3. Other medications per the [facility] DISCHARGE INSTRUCTIONS:    She is to have physical therapy for gait training and range of motion, and can be weightbearing as tolerated on the left leg. Her incision is to be kept clean, dry, and intact at all times. She is to ice and elevate the left lower extremity for pain and swelling. She is to contact my office if she develops increased pain, swelling, numbness, tingling, redness, fevers, or chills. She is to follow up in my office in 3 weeks at her prescheduled appointment.            Edy Charlton DO

## 2023-01-07 NOTE — DISCHARGE INSTRUCTIONS
DISCHARGE INSTRUCTIONS:    She is to have physical therapy for gait training and range of motion, and can be weightbearing as tolerated on the left leg. Her incision is to be kept clean, dry, and intact at all times. She is to ice and elevate the left lower extremity for pain and swelling. She is to contact my office if she develops increased pain, swelling, numbness, tingling, redness, fevers, or chills. She is to follow up in my office in 3 weeks at her prescheduled appointment.

## 2023-01-16 ENCOUNTER — HOSPITAL ENCOUNTER (OUTPATIENT)
Age: 53
Setting detail: SPECIMEN
Discharge: HOME | End: 2023-01-16
Payer: COMMERCIAL

## 2023-01-16 ENCOUNTER — TELEPHONE (OUTPATIENT)
Dept: ORTHOPEDIC SURGERY | Age: 53
End: 2023-01-16

## 2023-01-16 LAB
ALBUMIN SERPL-MCNC: 4.1 GM/DL (ref 3.4–5)
ALP BLD-CCNC: 124 IU/L (ref 40–128)
ALT SERPL-CCNC: 11 U/L (ref 10–40)
ANION GAP SERPL CALCULATED.3IONS-SCNC: 14 MMOL/L (ref 4–16)
AST SERPL-CCNC: 15 IU/L (ref 15–37)
BILIRUB SERPL-MCNC: 0.4 MG/DL (ref 0–1)
BUN BLDV-MCNC: 15 MG/DL (ref 6–23)
CALCIUM SERPL-MCNC: 9.5 MG/DL (ref 8.3–10.6)
CHLORIDE BLD-SCNC: 103 MMOL/L (ref 99–110)
CHOLESTEROL: 144 MG/DL
CO2: 26 MMOL/L (ref 21–32)
CREAT SERPL-MCNC: 0.9 MG/DL (ref 0.6–1.1)
ESTIMATED AVERAGE GLUCOSE: 154 MG/DL
GFR SERPL CREATININE-BSD FRML MDRD: >60 ML/MIN/1.73M2
GLUCOSE BLD-MCNC: 93 MG/DL (ref 70–99)
HBA1C MFR BLD: 7 % (ref 4.2–6.3)
HDLC SERPL-MCNC: 49 MG/DL
LDL CHOLESTEROL CALCULATED: 71 MG/DL
POTASSIUM SERPL-SCNC: 4 MMOL/L (ref 3.5–5.1)
SODIUM BLD-SCNC: 143 MMOL/L (ref 135–145)
TOTAL PROTEIN: 6.5 GM/DL (ref 6.4–8.2)
TRIGL SERPL-MCNC: 121 MG/DL

## 2023-01-16 PROCEDURE — 80061 LIPID PANEL: CPT

## 2023-01-16 PROCEDURE — 80053 COMPREHEN METABOLIC PANEL: CPT

## 2023-01-16 PROCEDURE — 83036 HEMOGLOBIN GLYCOSYLATED A1C: CPT

## 2023-01-16 NOTE — TELEPHONE ENCOUNTER
Spoke with the patient today. She overall is doing great with her healing. Pt was sent home with an ice man machine that did not have a plug in. I have emailed candy at [facility] to reach out to the patient so she can get a cord.

## 2023-01-26 ENCOUNTER — OFFICE VISIT (OUTPATIENT)
Dept: ORTHOPEDIC SURGERY | Age: 53
End: 2023-01-26

## 2023-01-26 ENCOUNTER — TELEPHONE (OUTPATIENT)
Dept: ORTHOPEDIC SURGERY | Age: 53
End: 2023-01-26

## 2023-01-26 VITALS
RESPIRATION RATE: 16 BRPM | TEMPERATURE: 97.6 F | HEART RATE: 102 BPM | BODY MASS INDEX: 50.02 KG/M2 | WEIGHT: 293 LBS | OXYGEN SATURATION: 97 % | HEIGHT: 64 IN

## 2023-01-26 DIAGNOSIS — Z96.652 S/P TOTAL KNEE ARTHROPLASTY, LEFT: Primary | ICD-10-CM

## 2023-01-26 PROCEDURE — 99024 POSTOP FOLLOW-UP VISIT: CPT | Performed by: PHYSICIAN ASSISTANT

## 2023-01-26 NOTE — TELEPHONE ENCOUNTER
Patient called by [facility] Nurse today, patient states she is doing well with recovery but still having pain. Patient states she still has and is taking her pain medication Rx'd after surgery. She is also elevating and icing, Ice Man sent new plug for ice machine because original plug was left at hospital. Patient states she has no concerns with incision and it seems to be healing well. Patient education on incision care, need for RICE, and the need to do exercises/PT and move about as tolerated post op'ly. Patient verbalized understanding of education. [facility] Nurse role explained and patient verbalized understanding of explanation.

## 2023-01-26 NOTE — PROGRESS NOTES
Date of surgery 1/5/2023  Surgeon: Dr. Helena Katz    History:  Ms. Emily Anderson is here in follow up regarding her left total knee arthroplasty. She is doing well and not complaining of any shortness of breath or chest pain. She has been doing home physical therapy and doing fairly well although still missing about 4 or 5 degrees of full extension. Physical:  Vitals:    01/26/23 1412   Pulse: (!) 102   Resp: 16   Temp: 97.6 °F (36.4 °C)   TempSrc: Infrared   SpO2: 97%   Weight: (!) 346 lb (156.9 kg)   Height: 5' 4\" (1.626 m)     Left knee incision is healed, no erythema or active drainage coming from the incision. Mild generalized edema in the left lower extremity  Calf is soft and mildly tender to palpation  Range of motion 5-95  No varus or valgus instability. Impression: Status post above, doing well       Plan:   Outpatient physical therapy order put in. Patient Instructions   If you have any question post operatively. Please contact office to speak with Jakub Guzman. Continue doing physical therapy  Continue using walker or cane  Continue weight bear as tolerated  Continue range of motion and exercises as instruction  Ice and elevate as needed  Tylenol or Motrin for pain  Follow up in 3 weeks with Dr. Aleida Osullivan. We are committed to providing you the best care possible. If you receive a survey after visiting one of our offices, please take time to share your experience concerning your physician office visit. These surveys are confidential and no health information about you is shared. We are eager to improve for you and we are counting on your feedback to help make that happen.

## 2023-01-26 NOTE — PATIENT INSTRUCTIONS
If you have any question post operatively. Please contact office to speak with Jakub 159. Continue doing physical therapy  Continue using walker or cane  Continue weight bear as tolerated  Continue range of motion and exercises as instruction  Ice and elevate as needed  Tylenol or Motrin for pain  Follow up in 3 weeks with Dr. Chanel Haskins. We are committed to providing you the best care possible. If you receive a survey after visiting one of our offices, please take time to share your experience concerning your physician office visit. These surveys are confidential and no health information about you is shared. We are eager to improve for you and we are counting on your feedback to help make that happen.

## 2023-02-10 ENCOUNTER — TELEPHONE (OUTPATIENT)
Dept: ORTHOPEDIC SURGERY | Age: 53
End: 2023-02-10

## 2023-02-10 NOTE — TELEPHONE ENCOUNTER
[facility] Nurse called and spoke with patient. Patient denies having any questions or concerns at this time. Explanation of [facility] Nurse role, patient verbalized understanding of explanation. Patient reports pain is controlled, rated 0/10. Pain education given, patient verbalized understanding of RICE, pain medication regime, and when to call office. Patient reports incision is clean, dry, and intact without discharge. Incision care education given and patient verbalized understanding of education. Post op appts reviewed, patient verbalized confirmation. Patient encouragement given for recovery. Patient is recovering at home. Patient denies any DME needs at this time. Patient is not in need of any medications being filled at this time. Patient reports doing home exercises x 1 a day. Post op PT discussed with patient, doing home health PT at this time, starts in pt PT on 2/21/2023. Infection and DVT prevention and monitoring education provided to patient, patient verbalized understanding of education. Fall prevention discussed with patient and patient verbalized understanding.

## 2023-02-13 ENCOUNTER — OFFICE VISIT (OUTPATIENT)
Dept: ORTHOPEDIC SURGERY | Age: 53
End: 2023-02-13

## 2023-02-13 VITALS
OXYGEN SATURATION: 99 % | WEIGHT: 293 LBS | HEART RATE: 79 BPM | HEIGHT: 64 IN | BODY MASS INDEX: 50.02 KG/M2 | RESPIRATION RATE: 15 BRPM

## 2023-02-13 DIAGNOSIS — Z96.652 S/P TOTAL KNEE ARTHROPLASTY, LEFT: Primary | ICD-10-CM

## 2023-02-13 ASSESSMENT — ENCOUNTER SYMPTOMS
COLOR CHANGE: 0
BACK PAIN: 0
ABDOMINAL PAIN: 0
CHEST TIGHTNESS: 0
EYE REDNESS: 0

## 2023-02-13 NOTE — PROGRESS NOTES
Patient returns to the office today for FU of the left TKA DOS 1/5/23. Pt states pain today is a 1/10. She will start formal therapy next week. Pt states she does continue to do HEP's at home.

## 2023-02-13 NOTE — PROGRESS NOTES
Subjective:      Patient ID: Bhupendra Roach is a 46 y.o. female. Patient returns to the office today for FU of the left TKA DOS 1/5/23. Pt states pain today is a 1/10. She will start formal therapy next week. Pt states she does continue to do HEP's at home. She comes in today for her 6-week postop recheck. Overall she states that she is feeling great with her left knee and is not having much pain and she is very happy with her progress with physical therapy at this point. Patient denies any new injury to the involved extremity/ joint, denies numbness or tingling in the involved extremity and denies fever or chills. She is continuing to have deep, aching grinding pain globally in her right knee. She states that she would like to consider proceeding with her right knee replacement in about 6 weeks from now. Review of Systems   Constitutional:  Negative for activity change, chills and fever. HENT:  Negative for congestion and drooling. Eyes:  Negative for redness. Respiratory:  Negative for chest tightness. Cardiovascular:  Negative for chest pain. Gastrointestinal:  Negative for abdominal pain. Endocrine: Negative for cold intolerance and heat intolerance. Musculoskeletal:  Positive for arthralgias, gait problem, joint swelling and myalgias. Negative for back pain. Skin:  Negative for color change, pallor, rash and wound. Neurological:  Negative for weakness and numbness. Psychiatric/Behavioral:  Negative for confusion. Past Medical History:   Diagnosis Date    Arthritis     Cancer [facility] treatment Ovarian and endometrial CA    Diabetes ([facility] [address]     Endometrial cancer ([facility] [address]     H/O cardiovascular stress test 3/17/2014    EF58% normal study    H/O echocardiogram 03/17/2014    EF 55%, normal LV systolic function, mildly dilated left atrium, normal sized right ventricle, normal valves, mild mitral and tricuspid regurg.     Ovarian cancer ([address] Objective:   Physical Exam  Constitutional:       Appearance: She is well-developed. HENT:      Head: Normocephalic. Nose: No congestion. Eyes:      Pupils: Pupils are equal, round, and reactive to light. Pulmonary:      Effort: Pulmonary effort is normal.   Musculoskeletal:         General: Swelling and tenderness present. No deformity. Normal range of motion. Cervical back: Normal range of motion. Right hip: Normal.      Left hip: Normal.      Right knee: Swelling and bony tenderness present. No deformity, effusion, erythema, ecchymosis or lacerations. Normal range of motion. Tenderness present over the medial joint line, lateral joint line and patellar tendon. No MCL or LCL tenderness. No LCL laxity or MCL laxity. Normal alignment and normal patellar mobility. Left knee: No swelling, deformity, effusion, erythema, ecchymosis, lacerations, bony tenderness or crepitus. Normal range of motion. No tenderness. No medial joint line, lateral joint line, MCL or LCL tenderness. No LCL laxity or MCL laxity. Normal alignment and normal patellar mobility. Skin:     General: Skin is warm and dry. Capillary Refill: Capillary refill takes less than 2 seconds. Coloration: Skin is not pale. Findings: No erythema or rash. Neurological:      Mental Status: She is alert and oriented to person, place, and time. Sensory: No sensory deficit. Motor: No weakness. Left knee-Incision clean, dry, intact, with no erythema, no drainage, and no signs of infection. 0-120    Right knee-Skin intact with no erythema, ecchymosis or lacerations present.   0-120    XR KNEE LEFT (3 VIEWS)    Result Date: 2/13/2023  XRAY X-ray 3 views of the left knee obtained and reviewed by me today in the office demonstrates age appropriate bone density throughout with well-positioned left total knee arthroplasty, there has been no change in position of components compared to prior x-rays, normal tracking of the patella, no acute osseous abnormalities. Impression: Stable left total knee arthroplasty with no acute process. Assessment:      Left TKA, 6 weeks      Plan:      I discussed with her today her x-ray findings. I explained to her that her implants are stable and remain in good alignment. I discussed with her today that she is continuing to progress extremely well. I discussed with the patient today antibiotic prophylaxis for procedures. I discussed with the patient today that their are symptoms are normal and should improve with time. Continue weight-bearing as tolerated. Continue range of motion exercises as instructed. Ice and elevate as needed. Tylenol or Motrin for pain. Follow up in 6 weeks for recheck with x-rays of the left knee, we will also x-ray the right knee and discuss proceeding with right knee replacement.             Edy 97, DO

## 2023-02-21 ENCOUNTER — HOSPITAL ENCOUNTER (OUTPATIENT)
Dept: PHYSICAL THERAPY | Age: 53
Setting detail: THERAPIES SERIES
Discharge: HOME | End: 2023-02-21
Payer: COMMERCIAL

## 2023-02-21 PROCEDURE — 97164 PT RE-EVAL EST PLAN CARE: CPT

## 2023-02-21 PROCEDURE — 97530 THERAPEUTIC ACTIVITIES: CPT

## 2023-02-21 PROCEDURE — 97110 THERAPEUTIC EXERCISES: CPT

## 2023-02-21 NOTE — PLAN OF CARE
Outpatient Physical Therapy           [place]           [x] Phone: [phone]   Fax: [phone]  [facility]           [] Phone: [phone]   Fax: [phone]     To:    Richard Hi PA-C  From: Ciaran Crockett, PT, DPT, OCS      Patient: Bianka Branch       : 1970  Diagnosis:   L TKA  23  Treatment Diagnosis:   L LE weakness, gait dysfunction, min pain   Date: 2023    Physical Therapy Certification/Re-Certification Form  Dear Richard Hi PA-C  The following patient has been evaluated for physical therapy services and for therapy to continue, insurance requires physician review of the treatment plan initially and every 90 days. Please review the attached evaluation and/or summary of the patient's plan of care, and verify that you agree therapy should continue by signing the attached document and sending it back to our office. Assessment:      Pt is 46year old female with L TKA 23. Pt completed therapy home care prior to start of outpatient therapy. Pt now has difficulties completing stairs, stepping onto a curb, prolonged walking, community activity and ADLs. Pt demo deficits this date that include R knee stiffness, weakness and min pain. Pt will benefit with PT services with progression of strength/ROM, manual and modalities to return to PLOF. Pt prior to onset of current condition had min/no pain with able to complete full ADLs and work activities. Patient received education on their current pathology and how their condition effects them with their functional activities. Patient understood discussion and questions were answered. Patient understands their activity limitations and understands rational for treatment progression.         Plan of Care/Treatment to date:  [x] Therapeutic Exercise  [x] Modalities:  [x] Therapeutic Activity     [x] Ultrasound  [x] Electrical Stimulation  [x] Gait Training      [] Cervical Traction [] Lumbar Traction  [x] Neuromuscular Re-education    [x] Cold/hotpack [] Iontophoresis   [x] Instruction in HEP      [x] Vasopneumatic    [] Dry Needling  [x] Manual Therapy               [] Aquatic Therapy       Other:          Frequency/Duration:  # Days per week: [] 1 day # Weeks: [] 1 week [x] 5 weeks     [x] 2 days   [] 2 weeks [] 6 weeks     [] 3 days   [] 3 weeks [] 7 weeks     [] 4 days   [] 4 weeks [] 8 weeks         [] 9 weeks [] 10 weeks         [] 11 weeks [] 12 weeks    Rehab Potential/Progress: [] Excellent [x] Good [] Fair  [] Poor     Goals:         Patient goals:  improve ease with mobility. Short term goals  Time Frame for Short term goals:       Long Term Goals  Time Frame for Long Term Goals:     Long Term Goals: 5 weeks  4/1/23  Long Term Goal 1: Pt will demo I with HEP/symptom management. Long Term Goal 2: Pt will demo normal gait mechanics with min/no deficits to ease community mobility. Long Term Goal 3: Pt will demo 0-120 deg knee A/PROM to ease transfers. Long Term Goal 4: Pt will completed TUG <14 sec to demo improved mobility. Long Term Goal 5: Pt will demo <5/28 improvement per KOOS to demo improved functional mobility. Long Term Goal 6: Pt will demo 5x sit to stand <16 sec to demo improved LE strength and ease with transfers. Electronically signed by:  Ramon Sosa, PT, DPT, OCS  2/21/2023, 7:46 AM    2/21/2023 2:38 PM       If you have any questions or concerns, please don't hesitate to call.   Thank you for your referral.      Physician Signature:________________________________Date:_________ TIME: _____  By signing above, therapists plan is approved by physician

## 2023-02-21 NOTE — FLOWSHEET NOTE
Outpatient Physical Therapy  [place]           [x] Phone: [phone]   Fax: [phone]  [facility]           [] Phone: [phone]   Fax: [phone]        Physical Therapy Daily Treatment Note  Date:  2023    Patient Name:  Marjorie Shaw    :  1970  MRN: [id number]  Restrictions/Precautions: n/a  Diagnosis:      L TKA  Date of Injury/Surgery: 23  Treatment Diagnosis:   L LE weakness, gait dysfunction, min pain   Insurance/Certification information:  [facility]  39 y   Referring Physician:     Joy Ponce PA-C  Next Doctor Visit:    Plan of care signed (Y/N):  sent   Outcome Measure: KOOS:  disability   Visit# / total visits:   1/10  Pain level: 0/10   Goals:     Patient goals:  improve mobility   Short term goals  Time Frame for Short term goals:            Long Term Goals  Time Frame for Long Term Goals:        Long Term Goals: 5 weeks  23  Long Term Goal 1: Pt will demo I with HEP/symptom management. Long Term Goal 2: Pt will demo normal gait mechanics with min/no deficits to ease community mobility. Long Term Goal 3: Pt will demo 0-120 deg knee A/PROM to ease transfers. Long Term Goal 4: Pt will completed TUG <14 sec to demo improved mobility. Long Term Goal 5: Pt will demo <5/28 improvement per KOOS to demo improved functional mobility. Long Term Goal 6: Pt will demo 5x sit to stand <16 sec to demo improved LE strength and ease with transfers. Summary of Evaluation:   Pt is 46year old female with L TKA 23. Pt completed therapy home care prior to start of outpatient therapy. Pt now has difficulties completing stairs, stepping onto a curb, prolonged walking, community activity and ADLs. Pt demo deficits this date that include R knee stiffness, open/closed chain weakness and min pain. Pt will benefit with PT services with progression of strength/ROM, manual and modalities to return to PLOF.  Pt prior to onset of current condition had min/no pain with able to complete full ADLs and work activities. Patient received education on their current pathology and how their condition effects them with their functional activities. Patient understood discussion and questions were answered. Patient understands their activity limitations and understands rational for treatment progression. Subjective:  Return back in couple of weeks to Dr. Juan Smith. Walking intermittent without use of cane. Completing HEP but limited due to family death and helping mom with moving. Worst pain: 1-2/10   Best: 0/10 pain majority of the time. Ground floor apartment. 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 Feels 75% at max function. Any changes in Ambulatory Summary Sheet? None        Objective:     Entered with SPC with short step length, hesitant with initial step upon initial weightbearing. R knee: 0- 110 deg knee ext-flexion   L knee: 0-114 deg knee ext-flexion     L LE:    4+/5  hip flexion, ext/ABD/ADD   4+/5 knee ext    5/5 knee flexion     6MWT: 614ft without rest break with SPC. TU.86 sec with SPC      22.36 sec without SPC  5x sit to stand: 21.38 sec with thigh push off   KOOS:         Exercises: (No more than 4 columns)   Exercise/Equipment 22 #1 23  #2 Date           WARM UP         Sci fit             TABLE      *Heel slide X10, 5\" Sitting with self OP 10x  5\"    Quad set X10, 3\"     SLR x10     SAQ X10, 3\"                                                        STANDING      *Anterior step ups   10x2    *STS  Thigh push off 5x2    *UE supported marches   20x2                                 PROPRIOCEPTION                                    MODALITIES                      Other Therapeutic Activities/Education:  HEP and importance of completion, POC and goals, anatomy and physiology related to condition      Home Exercise Program:  *HO issued, reviewed and discussed with patient. All questions answered. Pt agreed to comply.       Manual Treatments: none      Modalities:  none      Communication with other providers:  POC sent 2/21/23       Assessment: Pt is 46year old female with L TKA 1/5/23. Pt completed therapy home care prior to start of outpatient therapy. Pt now has difficulties completing stairs, stepping onto a curb, prolonged walking, community activity and ADLs. Pt demo deficits this date that include R knee stiffness, weakness and min pain. Pt will benefit with PT services with progression of strength/ROM, manual and modalities to return to [facility]. Pt prior to onset of current condition had min/no pain with able to complete full ADLs and work activities. Patient received education on their current pathology and how their condition effects them with their functional activities. Patient understood discussion and questions were answered. Patient understands their activity limitations and understands rational for treatment progression. Plan for Next Session:   Specific Instructions for Next Treatment: functional progression of closed chain activities, gait training, neuro re-ed to maximize function.      Time In / Time Out:  1345 - 1430        Timed Code/Total Treatment Minutes:  25'/45'             10' TA, 15' TE, 1 Ree-sulema       Next Progress Note due:  re-eval 2/21/23      Plan of Care Interventions:  [x] Therapeutic Exercise  [x] Modalities:  [x] Therapeutic Activity     [] Ultrasound  [x] Estim  [x] Gait Training      [] Cervical Traction [] Lumbar Traction  [x] Neuromuscular Re-education    [x] Cold/hotpack [] Iontophoresis   [x] Instruction in HEP      [x] Vasopneumatic   [] Dry Needling    [x] Manual Therapy               [] Aquatic Therapy              Electronically signed by:  Tierra Suárez, PT, DPT, OCS     2/21/2023 7:46 AM

## 2023-02-23 ENCOUNTER — HOSPITAL ENCOUNTER (OUTPATIENT)
Dept: PHYSICAL THERAPY | Age: 53
Setting detail: THERAPIES SERIES
Discharge: HOME | End: 2023-02-23
Payer: COMMERCIAL

## 2023-02-23 PROCEDURE — 97110 THERAPEUTIC EXERCISES: CPT

## 2023-02-23 NOTE — FLOWSHEET NOTE
Outpatient Physical Therapy  [facility]           [x] Phone: [phone]   Fax: [phone]  [facility]           [] Phone: [phone]   Fax: [phone]        Physical Therapy Daily Treatment Note  Date:  2023    Patient Name:  Tyshawn Coon    :  1970  MRN: [id number]  Restrictions/Precautions: n/a  Diagnosis:      L TKA  Date of Injury/Surgery: 23  Treatment Diagnosis:   L LE weakness, gait dysfunction, min pain   Insurance/Certification information:  [facility]  39 pcy   Referring Physician:     Michael Connor PA-C  Next Doctor Visit:    Plan of care signed (Y/N):  Y  Outcome Measure: KOOS:  disability   Visit# / total visits:   2/10  Pain level: 1/10       Goals:     Patient goals:  improve mobility   Short term goals  Time Frame for Short term goals:      Long Term Goals  Time Frame for Long Term Goals:        Long Term Goals: 5 weeks  23  Long Term Goal 1: Pt will demo I with HEP/symptom management. Long Term Goal 2: Pt will demo normal gait mechanics with min/no deficits to ease community mobility. Long Term Goal 3: Pt will demo 0-120 deg knee A/PROM to ease transfers. Long Term Goal 4: Pt will completed TUG <14 sec to demo improved mobility. Long Term Goal 5: Pt will demo < improvement per KOOS to demo improved functional mobility. Long Term Goal 6: Pt will demo 5x sit to stand <16 sec to demo improved LE strength and ease with transfers. Summary of Evaluation:   Pt is 46year old female with L TKA 23. Pt completed therapy home care prior to start of outpatient therapy. Pt now has difficulties completing stairs, stepping onto a curb, prolonged walking, community activity and ADLs. Pt demo deficits this date that include R knee stiffness, open/closed chain weakness and min pain. Pt will benefit with PT services with progression of strength/ROM, manual and modalities to return to PLOF.  Pt prior to onset of current condition had min/no pain with able to complete full ADLs and work activities. Patient received education on their current pathology and how their condition effects them with their functional activities. Patient understood discussion and questions were answered. Patient understands their activity limitations and understands rational for treatment progression. Subjective:  Millie Hannon arrives to therapy stating that the knee is just a little uncomfortable, stiff. Was able to do some of her exercises but not all, was sick yesterday. Any changes in Ambulatory Summary Sheet? None        Objective: COVID screening questions were asked and patient attested that there had been no contact or symptoms     Limited some by pain in R knee. B UE assist needed for step ups. Exercises: (No more than 4 columns)   Exercise/Equipment 12/19/22 #1 2/21/23  #2 2/23/2023 #3           WARM UP      Sci fit S15/A6   L1 5'         TABLE      *Heel slide X10, 5\" Sitting with self OP 10x  5\" Sitting with self OP 10*5\"    Quad set X10, 3\"  20*5\"   SLR x10  10*   SAQ X10, 3\"  2*10   LAQ    0# 10*  3# 10*                                                STANDING      *Anterior step ups   10x2 6\" 10*   *STS  Thigh push off 5x2 2*10 from elevated mat table no UE use    *UE supported marches   20x2 2*20   Lunge to step for flexion stretch    12\" 10*3\"                           PROPRIOCEPTION      Wobble board a/p m/l balance   Next                            MODALITIES                      Other Therapeutic Activities/Education:      Home Exercise Program:  *HO issued, reviewed and discussed with patient. All questions answered. Pt agreed to comply. Manual Treatments:  none      Modalities:  none      Communication with other providers:  POC sent 2/21/23       Assessment: Millie Hannon is doing quite well for stage of healing. She has good ROM and good activation of her quads. She is functionally weak in her quads with difficulties with STS and stairs.   End session pain: 0/10    Plan for Next Session:  functional progression of closed chain activities, gait training, neuro re-ed to maximize function.      Time In / Time Out:   1300/1340      Timed Code/Total Treatment Minutes:  36' 3 TE       Next Progress Note due:  re-eval 2/21/23      Plan of Care Interventions:  [x] Therapeutic Exercise  [x] Modalities:  [x] Therapeutic Activity     [] Ultrasound  [x] Estim  [x] Gait Training      [] Cervical Traction [] Lumbar Traction  [x] Neuromuscular Re-education    [x] Cold/hotpack [] Iontophoresis   [x] Instruction in HEP      [x] Vasopneumatic   [] Dry Needling    [x] Manual Therapy               [] Aquatic Therapy              Electronically signed by:  Arnaldo Cruz PTA        2/23/2023 8:39 AM

## 2023-03-01 ENCOUNTER — HOSPITAL ENCOUNTER (OUTPATIENT)
Dept: PHYSICAL THERAPY | Age: 53
Setting detail: THERAPIES SERIES
Discharge: HOME | End: 2023-03-01
Payer: COMMERCIAL

## 2023-03-01 PROCEDURE — 97110 THERAPEUTIC EXERCISES: CPT

## 2023-03-01 PROCEDURE — 97530 THERAPEUTIC ACTIVITIES: CPT

## 2023-03-01 PROCEDURE — 97016 VASOPNEUMATIC DEVICE THERAPY: CPT

## 2023-03-01 NOTE — FLOWSHEET NOTE
Outpatient Physical Therapy  Tr           [x] Phone: [phone]   Fax: [phone]  Cherylle Lefort           [] Phone: [phone]   Fax: [phone]        Physical Therapy Daily Treatment Note  Date:  3/1/2023    Patient Name:  Anatoly Doherty    :  1970  MRN: [id number]  Restrictions/Precautions: n/a  Diagnosis:      L TKA  Date of Injury/Surgery: 23  Treatment Diagnosis:   L LE weakness, gait dysfunction, min pain   Insurance/Certification information:  [facility]  39 y   Referring Physician:     Josette Benavides PA-C  Next Doctor Visit:  April (first week)  Plan of care signed (Y/N):  Y  Outcome Measure: KOOS:  disability   Visit# / total visits:   4/10  Pain level: 0/10 pain stiffness       Goals:     Patient goals:  improve mobility   Short term goals  Time Frame for Short term goals:      Long Term Goals  Time Frame for Long Term Goals:        Long Term Goals: 5 weeks  23  Long Term Goal 1: Pt will demo I with HEP/symptom management. Long Term Goal 2: Pt will demo normal gait mechanics with min/no deficits to ease community mobility. Long Term Goal 3: Pt will demo 0-120 deg knee A/PROM to ease transfers. Long Term Goal 4: Pt will completed TUG <14 sec to demo improved mobility. Long Term Goal 5: Pt will demo <5/28 improvement per KOOS to demo improved functional mobility. Long Term Goal 6: Pt will demo 5x sit to stand <16 sec to demo improved LE strength and ease with transfers. Summary of Evaluation:   Pt is 46year old female with L TKA 23. Pt completed therapy home care prior to start of outpatient therapy. Pt now has difficulties completing stairs, stepping onto a curb, prolonged walking, community activity and ADLs. Pt demo deficits this date that include R knee stiffness, open/closed chain weakness and min pain. Pt will benefit with PT services with progression of strength/ROM, manual and modalities to return to PLOF.  Pt prior to onset of current condition had min/no pain with able to complete full ADLs and work activities. Patient received education on their current pathology and how their condition effects them with their functional activities. Patient understood discussion and questions were answered. Patient understands their activity limitations and understands rational for treatment progression. Subjective:  Pt stated she hasn't been doing her HEP. She has been walking and Sunday she was sick for the day. Any changes in Ambulatory Summary Sheet? None        Objective: COVID screening questions were asked and patient attested that there had been no contact or symptoms    At arrival ambulation with SPC on R side with antalgic gait mod lateral sway decreased heel strike and toe off. R LE resting position is in ER  No quad lag with SLR noted quad fatigue second set    AROM/PROM:  Flexion 120°  Ext 0°    B>1 UE assist needed for step ups.        Exercises: (No more than 4 columns)   Exercise/Equipment 12/19/22 #1 2/21/23  #2 2/23/2023 #3 3/1/23 #4         Week 8 post op   WARM UP       Sci fit S15/A6   L1 5' L3 5'          TABLE       *Heel slide X10, 5\" Sitting with self OP 10x  5\" Sitting with self OP 10*5\"  X 10 AROM  X 10 AROM with OP   Quad set X10, 3\"  20*5\" 20* 5\"   SLR x10  10* 2*10   SAQ X10, 3\"  2*10 2*10 1#   LAQ    0# 10*  3# 10*                                                        STANDING       *Anterior step ups   10x2 6\" 10* 6\" 10*   *STS  Thigh push off 5x2 2*10 from elevated mat table no UE use  10* 20\"   10* 19\"  No UE  Excessive fwd flexion   *UE supported marches   20x2 2*20 20*   Lunge to step for flexion stretch    12\" 10*3\"     Shuttle    10* ea.  2c DL   1c LLE   Lunge    10*               PROPRIOCEPTION       Wobble board a/p m/l balance    Next                                MODALITIES       vaso    10'              Other Therapeutic Activities/Education:      Home Exercise Program:  *HO issued, reviewed and discussed with patient. All questions answered. Pt agreed to comply. Manual Treatments:  none      Modalities:  Patient received vasocompression on their L knee for pain and inflammation for 10 min on low pressure. Patient had negative skin reaction afterwards. Girth measurements around extremity were pre:40 cm/ post 39 cm  See subjective and assessment for pre and post treatment patient reported pain levels. Communication with other providers:  POC sent 2/21/23       Assessment: Pt demonstrated good tolerance to tx with progressed exercises. Reviewed importance of HEP. Noted  functional mobility and gait deficits with decreased quad activation and posture neuromuscular engagement down the chain. Pt would continue to benefit from skilled therapy interventions to address remaining impairments, improve mobility and strength and progress toward goal completion while reducing risk for re-injury or further decline. End session pain: 0/10    Plan for Next Session:  functional progression of closed chain activities, gait training, neuro re-ed to maximize function.      Time In / Time Out:  0845/0940      Timed Code/Total Treatment Minutes:  45'/45' 2 TE 1 TA 1 vaso      Next Progress Note due:  re-eval 2/21/23      Plan of Care Interventions:  [x] Therapeutic Exercise  [x] Modalities:  [x] Therapeutic Activity     [] Ultrasound  [x] Estim  [x] Gait Training      [] Cervical Traction [] Lumbar Traction  [x] Neuromuscular Re-education    [x] Cold/hotpack [] Iontophoresis   [x] Instruction in HEP      [x] Vasopneumatic   [] Dry Needling    [x] Manual Therapy               [] Aquatic Therapy              Electronically signed by:  Floyd Rai PTA, CLT 3/1/2023 8:41 AM

## 2023-03-03 ENCOUNTER — HOSPITAL ENCOUNTER (OUTPATIENT)
Dept: PHYSICAL THERAPY | Age: 53
Setting detail: THERAPIES SERIES
Discharge: HOME | End: 2023-03-03
Payer: COMMERCIAL

## 2023-03-03 PROCEDURE — 97110 THERAPEUTIC EXERCISES: CPT

## 2023-03-03 PROCEDURE — 97530 THERAPEUTIC ACTIVITIES: CPT

## 2023-03-03 PROCEDURE — 97016 VASOPNEUMATIC DEVICE THERAPY: CPT

## 2023-03-03 NOTE — FLOWSHEET NOTE
Outpatient Physical Therapy  Tr           [x] Phone: [phone]   Fax: [phone]  Cheli teran           [] Phone: [phone]   Fax: [phone]        Physical Therapy Daily Treatment Note  Date:  3/3/2023    Patient Name:  Anatoly Doherty    :  1970  MRN: [id number]  Restrictions/Precautions: n/a  Diagnosis:      L TKA  Date of Injury/Surgery: 23  Treatment Diagnosis:   L LE weakness, gait dysfunction, min pain   Insurance/Certification information:  [facility]  39 pcy   Referring Physician:     Josette Benavides PA-C  Next Doctor Visit:  April (first week)  Plan of care signed (Y/N):  Y  Outcome Measure: KOOS:  disability   Visit# / total visits:   5/10  Pain level: 1/10 pain stinging 1/10 R knee pain       Goals:     Patient goals:  improve mobility   Short term goals  Time Frame for Short term goals:      Long Term Goals  Time Frame for Long Term Goals:        Long Term Goals: 5 weeks  23  Long Term Goal 1: Pt will demo I with HEP/symptom management. Long Term Goal 2: Pt will demo normal gait mechanics with min/no deficits to ease community mobility. Long Term Goal 3: Pt will demo 0-120 deg knee A/PROM to ease transfers. Long Term Goal 4: Pt will completed TUG <14 sec to demo improved mobility. Long Term Goal 5: Pt will demo < improvement per KOOS to demo improved functional mobility. Long Term Goal 6: Pt will demo 5x sit to stand <16 sec to demo improved LE strength and ease with transfers. Summary of Evaluation:   Pt is 46year old female with L TKA 23. Pt completed therapy home care prior to start of outpatient therapy. Pt now has difficulties completing stairs, stepping onto a curb, prolonged walking, community activity and ADLs. Pt demo deficits this date that include R knee stiffness, open/closed chain weakness and min pain. Pt will benefit with PT services with progression of strength/ROM, manual and modalities to return to PLOF.  Pt prior to onset of current condition had min/no pain with able to complete full ADLs and work activities. Patient received education on their current pathology and how their condition effects them with their functional activities. Patient understood discussion and questions were answered. Patient understands their activity limitations and understands rational for treatment progression.         Subjective:  Pt stated her R knee pain, the right seat has been giving her limitations on doing her HEP.       Any changes in Ambulatory Summary Sheet?  None        Objective: COVID screening questions were asked and patient attested that there had been no contact or symptoms    At arrival ambulation with SPC on R side with antalgic gait min lateral sway decreased heel strike and toe off.   Able to ambulate short distances without AD with antalgic gait  Some R knee pain limitation  Lower tolerance to standing activities.      AROM/PROM:  Flexion 125°/135°  Ext 0°    B>1 UE assist needed for step ups.       Exercises: (No more than 4 columns)   Exercise/Equipment 12/19/22 #1 2/21/23  #2 2/23/2023 #3 3/1/23 #4 3/3/23 #5         Week 8 post op    WARM UP        Sci fit S15/A6   L1 5' L3 5' L3 5'           TABLE        *Heel slide X10, 5\" Sitting with self OP 10x  5\" Sitting with self OP 10*5\"  X 10 AROM  X 10 AROM with OP X 10 AROM with OP   Quad set X10, 3\"  20*5\" 20* 5\"    SLR x10  10* 2*10    SAQ X10, 3\"  2*10 2*10 1# 3# 10*2   LAQ    0# 10*  3# 10*  3# 10*2                                                              STANDING        *Anterior step ups   10x2 6\" 10* 6\" 10* 6\" 2 x 10 BUE   Lat step up     4\" x 10 BUE   *STS  Thigh push off 5x2 2*10 from elevated mat table no UE use  10* 20\"   10* 19\"  No UE  Excessive fwd flexion 10*2 19\"  No UE  Excessive fwd flexion   *UE supported marches   20x2 2*20 20* Airex 20x   Lunge to step for flexion stretch    12\" 10*3\"      Shuttle    10* ea.  2c DL   1c LLE 10* 2 2c DL   10*2  1c LLE   Lunge     10* 10*                PROPRIOCEPTION        Wobble board a/p m/l balance    Next  1' ea. MODALITIES        vaso    10' 10'               Other Therapeutic Activities/Education:      Home Exercise Program:  *HO issued, reviewed and discussed with patient. All questions answered. Pt agreed to comply. Manual Treatments:  none      Modalities:  Patient received vasocompression on their L knee for pain and inflammation for 10 min on low pressure. Patient had negative skin reaction afterwards. Girth measurements around extremity were pre: 40 cm/ post 39 cm  See subjective and assessment for pre and post treatment patient reported pain levels. Communication with other providers:  POC sent 2/21/23       Assessment: Pt demonstrated good tolerance to tx with progressed exercises. Reviewed importance of HEP. Noted  functional mobility and gait deficits with decreased quad activation and posture neuromuscular engagement down the chain. Pt would continue to benefit from skilled therapy interventions to address remaining impairments, improve mobility and strength and progress toward goal completion while reducing risk for re-injury or further decline. End session pain: 0/10    Plan for Next Session:  functional progression of closed chain activities, gait training, neuro re-ed to maximize function.      Time In / Time Out:  1300/1345      Timed Code/Total Treatment Minutes:  45'/45' 2 TE 1 TA 1 vaso      Next Progress Note due:  re-eval 2/21/23      Plan of Care Interventions:  [x] Therapeutic Exercise  [x] Modalities:  [x] Therapeutic Activity     [] Ultrasound  [x] Estim  [x] Gait Training      [] Cervical Traction [] Lumbar Traction  [x] Neuromuscular Re-education    [x] Cold/hotpack [] Iontophoresis   [x] Instruction in HEP      [x] Vasopneumatic   [] Dry Needling    [x] Manual Therapy               [] Aquatic Therapy              Electronically signed by:  Denise Molina PTA, GIGI 3/3/2023 12:58 PM

## 2023-03-07 ENCOUNTER — HOSPITAL ENCOUNTER (OUTPATIENT)
Dept: PHYSICAL THERAPY | Age: 53
Setting detail: THERAPIES SERIES
Discharge: HOME | End: 2023-03-07
Payer: COMMERCIAL

## 2023-03-07 PROCEDURE — 97530 THERAPEUTIC ACTIVITIES: CPT

## 2023-03-07 PROCEDURE — 97110 THERAPEUTIC EXERCISES: CPT

## 2023-03-07 NOTE — FLOWSHEET NOTE
Outpatient Physical Therapy  Tr           [x] Phone: [phone]   Fax: [phone]  [facility]li park           [] Phone: [phone]   Fax: [phone]        Physical Therapy Daily Treatment Note  Date:  3/7/2023    Patient Name:  Ted Pedro    :  1970  MRN: [id number]  Restrictions/Precautions: n/a  Diagnosis:      L TKA  Date of Injury/Surgery: 23  Treatment Diagnosis:   L LE weakness, gait dysfunction, min pain   Insurance/Certification information:  [facility]  39 y   Referring Physician:     Sushil Macias PA-C  Next Doctor Visit:  April (first week)  Plan of care signed (Y/N):  Y  Outcome Measure: KOOS:  disability   Visit# / total visits:   6/10  Pain level:  1/10 pain stinging 10 R knee pain       Goals:     Patient goals:  improve mobility   Short term goals  Time Frame for Short term goals:      Long Term Goals  Time Frame for Long Term Goals:        Long Term Goals: 5 weeks  23  Long Term Goal 1: Pt will demo I with HEP/symptom management. Long Term Goal 2: Pt will demo normal gait mechanics with min/no deficits to ease community mobility. Long Term Goal 3: Pt will demo 0-120 deg knee A/PROM to ease transfers. MET   Long Term Goal 4: Pt will completed TUG <14 sec to demo improved mobility. Partially MET   16.39 sec 3/7/23   Long Term Goal 5: Pt will demo <5/28 improvement per KOOS to demo improved functional mobility. Long Term Goal 6: Pt will demo 5x sit to stand <16 sec to demo improved LE strength and ease with transfers. Summary of Evaluation:   Pt is 46year old female with L TKA 23. Pt completed therapy home care prior to start of outpatient therapy. Pt now has difficulties completing stairs, stepping onto a curb, prolonged walking, community activity and ADLs. Pt demo deficits this date that include R knee stiffness, open/closed chain weakness and min pain.  Pt will benefit with PT services with progression of strength/ROM, manual and modalities to return to PLOF. Pt prior to onset of current condition had min/no pain with able to complete full ADLs and work activities. Patient received education on their current pathology and how their condition effects them with their functional activities. Patient understood discussion and questions were answered. Patient understands their activity limitations and understands rational for treatment progression. Subjective:  Pt stated her R knee pain 2/10 pain. No issues after last visit. Walking more without cane. Any changes in Ambulatory Summary Sheet? None        Objective: COVID screening questions were asked and patient attested that there had been no contact or symptoms      Able to ambulate short distances without AD with min antalgic gait  Some R knee pain limitation in reps secondary to pain with step ups   Full TKE with SLR with good technique.        AROM/PROM:  Flexion 125°   Ext 0°    TU.39 sec without AD      Exercises: (No more than 4 columns)   Exercise/Equipment 3/1/23 #4 3/3/23 #5 3/7/23  #6      Week 8 post op     WARM UP      Sci fit S15/A6 L3 5' L3 5' Lv   5'          TABLE      *Heel slide X 10 AROM  X 10 AROM with OP X 10 AROM with OP GSB 10x2 with OP   Quad set 20* 5\"     SLR 2*10  2# 10x2   SAQ 2*10 1# 3# 10*2    LAQ   3# 10*2 5# 10x2   Bridge    GSB 10x2                                          STANDING      *Anterior step ups  6\" 10* 6\" 2 x 10 BUE 5x2  6in    Lat step up  4\" x 10 BUE    *STS 10* 20\"   10* 19\"  No UE  Excessive fwd flexion 10*2 19\"  No UE  Excessive fwd flexion    *UE supported marches  20* Airex 20x    Lunge to step for flexion stretch       Shuttle 10* ea.  2c DL   1c LLE 10* 2 2c DL   10*2  1c LLE L LE 2c 10x2   Lunge 10* 10*               PROPRIOCEPTION      Wobble board a/p m/l balance Next  1' ea.  30\"x2   Steps taps on airex    1 finger 20x2                     MODALITIES      vaso 10' 10' --             Other Therapeutic Activities/Education:      Home Exercise Program:  *HO issued, reviewed and discussed with patient. All questions answered. Pt agreed to comply. Manual Treatments:  none      Modalities:         Communication with other providers:  POC sent 2/21/23       Assessment: Pt demonstrated good tolerance to tx with progressed exercises. R LE is uninvolved LE and worse than surgical knee. Improving quad activation with closed chain on L with limitations on reps with R knee with planned R TKA in future. Progressing well with ROM and functionally. Anticipate finishing POC with expected discharge at that time. Pt would continue to benefit from skilled therapy interventions to address remaining impairments, improve mobility and strength and progress toward goal completion while reducing risk for re-injury or further decline. End session pain: 1/10    Plan for Next Session:  functional progression of closed chain activities, gait training, neuro re-ed to maximize function.      Time In / Time Out:  2335-7930      Timed Code/Total Treatment Minutes:  40/40'       2 TE 30'   1 TA   10'       Next Progress Note due:  re-eval 2/21/23      Plan of Care Interventions:  [x] Therapeutic Exercise  [x] Modalities:  [x] Therapeutic Activity     [] Ultrasound  [x] Estim  [x] Gait Training      [] Cervical Traction [] Lumbar Traction  [x] Neuromuscular Re-education    [x] Cold/hotpack [] Iontophoresis   [x] Instruction in HEP      [x] Vasopneumatic   [] Dry Needling    [x] Manual Therapy               [] Aquatic Therapy              Electronically signed by:  Philip Frazier, PT, DPT, OCS .    3/7/2023 7:56 AM

## 2023-03-10 ENCOUNTER — TELEPHONE (OUTPATIENT)
Dept: ORTHOPEDIC SURGERY | Age: 53
End: 2023-03-10

## 2023-03-10 ENCOUNTER — HOSPITAL ENCOUNTER (OUTPATIENT)
Dept: PHYSICAL THERAPY | Age: 53
Setting detail: THERAPIES SERIES
Discharge: HOME | End: 2023-03-10
Payer: COMMERCIAL

## 2023-03-10 PROCEDURE — 97110 THERAPEUTIC EXERCISES: CPT

## 2023-03-10 PROCEDURE — 97112 NEUROMUSCULAR REEDUCATION: CPT

## 2023-03-10 PROCEDURE — 97016 VASOPNEUMATIC DEVICE THERAPY: CPT

## 2023-03-10 PROCEDURE — 97530 THERAPEUTIC ACTIVITIES: CPT

## 2023-03-10 NOTE — TELEPHONE ENCOUNTER
[facility]tho [facility] Nurse called and left message for patient. Explanation of [facility]o [facility] Nurse role, left on message. Office phone number left for patient should patient have any questions or concerns.

## 2023-03-10 NOTE — FLOWSHEET NOTE
Outpatient Physical Therapy  Tr           [x] Phone: [phone]   Fax: [phone]  Shannan Lomas           [] Phone: [phone]   Fax: [phone]        Physical Therapy Daily Treatment Note  Date:  3/10/2023    Patient Name:  Andrea Jack    :  1970  MRN: [id number]  Restrictions/Precautions: n/a  Diagnosis:      L TKA  Date of Injury/Surgery: 23  Treatment Diagnosis:   L LE weakness, gait dysfunction, min pain   Insurance/Certification information:  [address]   Referring Physician:     Yomi Grover PA-C  Next Doctor Visit:  April (first week)  Plan of care signed (Y/N):  Y  Outcome Measure: KOOS:  disability   Visit# / total visits:  7/10  Pain level: 2/10 pain stinging 0/10 R knee pain       Goals:     Patient goals:  improve mobility   Short term goals  Time Frame for Short term goals:      Long Term Goals  Time Frame for Long Term Goals:        Long Term Goals: 5 weeks  23  Long Term Goal 1: Pt will demo I with HEP/symptom management. Long Term Goal 2: Pt will demo normal gait mechanics with min/no deficits to ease community mobility. Long Term Goal 3: Pt will demo 0-120 deg knee A/PROM to ease transfers. MET   Long Term Goal 4: Pt will completed TUG <14 sec to demo improved mobility. Partially MET   16.39 sec 3/7/23   Long Term Goal 5: Pt will demo <5/28 improvement per KOOS to demo improved functional mobility. Long Term Goal 6: Pt will demo 5x sit to stand <16 sec to demo improved LE strength and ease with transfers. Summary of Evaluation:   Pt is 46year old female with L TKA 23. Pt completed therapy home care prior to start of outpatient therapy. Pt now has difficulties completing stairs, stepping onto a curb, prolonged walking, community activity and ADLs. Pt demo deficits this date that include R knee stiffness, open/closed chain weakness and min pain.  Pt will benefit with PT services with progression of strength/ROM, manual and modalities to return to PLOF. Pt prior to onset of current condition had min/no pain with able to complete full ADLs and work activities. Patient received education on their current pathology and how their condition effects them with their functional activities. Patient understood discussion and questions were answered. Patient understands their activity limitations and understands rational for treatment progression. Subjective:  Pt stated her L knee pain 2/10 pain. More soreness after last visit. Not sleeping well due to carpal tunnel on L hand. Any changes in Ambulatory Summary Sheet? None        Objective: COVID screening questions were asked and patient attested that there had been no contact or symptoms      min antalgic gait with SPC with greater lateral lean than normal   Full TKE with SLR with good technique. Min increase in L ankle pain after fitter LE press. Min challenge with step ups with L LE only. Not completed on R LE for pt comfort. Full AR OM with all exercises.          Exercises: (No more than 4 columns)   Exercise/Equipment 3/1/23 #4 3/3/23 #5 3/7/23  #6 3/10/23  #7      Week 8 post op      WARM UP       Sci fit S15/A6 L3 5' L3 5' Lv   5'  5'           TABLE       *Heel slide X 10 AROM  X 10 AROM with OP X 10 AROM with OP GSB 10x2 with OP GSB 10x2 with OP   Quad set 20* 5\"      SLR 2*10  2# 10x2 2# 10x2   SAQ 2*10 1# 3# 10*2     LAQ   3# 10*2 5# 10x2 5# 10x2   Bridge    GSB 10x2 GSB 10x2   Fitter LE press    All cords 10x2   Resisted hamstring curls    RTB 10x2                                  STANDING       *Anterior step ups  6\" 10* 6\" 2 x 10 BUE 5x2  6in  10x2  6in  L LE    Lat step up  4\" x 10 BUE     *STS 10* 20\"   10* 19\"  No UE  Excessive fwd flexion 10*2 19\"  No UE  Excessive fwd flexion     *UE supported marches  20* Airex 20x     Lunge to step for flexion stretch        Shuttle 10* ea.  2c DL   1c LLE 10* 2 2c DL   10*2  1c LLE L LE 2c 10x2 L LE 2c 10x2   Lunge 10* 10* PROPRIOCEPTION       Wobble board a/p m/l balance Next  1' ea.  30\"x2 30\"x2 each dir    Steps taps on airex    1 finger 20x2    EC on airex     20\"x2   Airex marches     20x2   Perturbations on airex     20\"x3                        MODALITIES       vaso 10' 10' -- 10'              Other Therapeutic Activities/Education:      Home Exercise Program:  *HO issued, reviewed and discussed with patient. All questions answered. Pt agreed to comply. Manual Treatments:  none      Modalities:         Communication with other providers:  POC sent 2/21/23       Assessment: Pt demonstrated good tolerance to tx with progressed exercises. L LE focused quad open/closed chain with fatigue. Improving quad activation with closed chain on L with limitations on reps with R knee with planned R TKA in future. Progressing well with ROM and functionally. Anticipate d/c in 3 visits with focus on strengthening. Pt would continue to benefit from skilled therapy interventions to address remaining impairments, improve mobility and strength and progress toward goal completion while reducing risk for re-injury or further decline. End session pain: 1/10    Plan for Next Session:  functional progression of closed chain activities, gait training, neuro re-ed to maximize function.      Time In / Time Out:  7336-6373      Timed Code/Total Treatment Minutes:   38/48'      1 TE 18'   1 TA   10'   Neuro 10'    vaso 10'       Next Progress Note due:  re-eval 2/21/23      Plan of Care Interventions:  [x] Therapeutic Exercise  [x] Modalities:  [x] Therapeutic Activity     [] Ultrasound  [x] Estim  [x] Gait Training      [] Cervical Traction [] Lumbar Traction  [x] Neuromuscular Re-education    [x] Cold/hotpack [] Iontophoresis   [x] Instruction in HEP      [x] Vasopneumatic   [] Dry Needling    [x] Manual Therapy               [] Aquatic Therapy              Electronically signed by:  Brie Mazariegos, PT, DPT, OCS .    3/10/2023 7:53 AM

## 2023-03-13 ENCOUNTER — OFFICE VISIT (OUTPATIENT)
Dept: ORTHOPEDIC SURGERY | Age: 53
End: 2023-03-13
Payer: COMMERCIAL

## 2023-03-13 VITALS
RESPIRATION RATE: 15 BRPM | BODY MASS INDEX: 50.02 KG/M2 | WEIGHT: 293 LBS | DIASTOLIC BLOOD PRESSURE: 82 MMHG | HEIGHT: 64 IN | SYSTOLIC BLOOD PRESSURE: 142 MMHG

## 2023-03-13 DIAGNOSIS — G56.02 LEFT CARPAL TUNNEL SYNDROME: Primary | ICD-10-CM

## 2023-03-13 DIAGNOSIS — G56.01 RIGHT CARPAL TUNNEL SYNDROME: ICD-10-CM

## 2023-03-13 PROCEDURE — 1036F TOBACCO NON-USER: CPT | Performed by: ORTHOPAEDIC SURGERY

## 2023-03-13 PROCEDURE — G8484 FLU IMMUNIZE NO ADMIN: HCPCS | Performed by: ORTHOPAEDIC SURGERY

## 2023-03-13 PROCEDURE — G8427 DOCREV CUR MEDS BY ELIG CLIN: HCPCS | Performed by: ORTHOPAEDIC SURGERY

## 2023-03-13 PROCEDURE — 3074F SYST BP LT 130 MM HG: CPT | Performed by: ORTHOPAEDIC SURGERY

## 2023-03-13 PROCEDURE — 3017F COLORECTAL CA SCREEN DOC REV: CPT | Performed by: ORTHOPAEDIC SURGERY

## 2023-03-13 PROCEDURE — 99214 OFFICE O/P EST MOD 30 MIN: CPT | Performed by: ORTHOPAEDIC SURGERY

## 2023-03-13 PROCEDURE — G8417 CALC BMI ABV UP PARAM F/U: HCPCS | Performed by: ORTHOPAEDIC SURGERY

## 2023-03-13 PROCEDURE — 3078F DIAST BP <80 MM HG: CPT | Performed by: ORTHOPAEDIC SURGERY

## 2023-03-13 NOTE — PROGRESS NOTES
Patient present to the office today for evaluation of the bilateral hand/wrist. Pt states L>R. Pt states she does have throbbing in the left wrist. Pt has tried braces with no relief     IMPRESSION:                  1.  Mildly abnormal EMG. 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 2.  No evidence of a concurrent cervical spinal nerve root injury (radiculopathy), plexopathy, generalized neuropathy or primary muscle disease.

## 2023-03-13 NOTE — PATIENT INSTRUCTIONS
We will schedule surgery at soonest convenience. If you have any questions regarding your surgery please call our office and ask to speak with Johana Childers [phone]     We are committed to providing you the best care possible. If you receive a survey after visiting one of our offices, please take time to share your experience concerning your physician office visit. These surveys are confidential and no health information about you is shared. We are eager to improve for you and we are counting on your feedback to help make that happen.

## 2023-03-14 ENCOUNTER — HOSPITAL ENCOUNTER (OUTPATIENT)
Dept: PHYSICAL THERAPY | Age: 53
Setting detail: THERAPIES SERIES
Discharge: HOME | End: 2023-03-14
Payer: COMMERCIAL

## 2023-03-14 ENCOUNTER — TELEPHONE (OUTPATIENT)
Dept: ORTHOPEDIC SURGERY | Age: 53
End: 2023-03-14

## 2023-03-14 PROCEDURE — 97110 THERAPEUTIC EXERCISES: CPT

## 2023-03-14 PROCEDURE — 97016 VASOPNEUMATIC DEVICE THERAPY: CPT

## 2023-03-14 PROCEDURE — 97530 THERAPEUTIC ACTIVITIES: CPT

## 2023-03-14 PROCEDURE — 97112 NEUROMUSCULAR REEDUCATION: CPT

## 2023-03-14 ASSESSMENT — ENCOUNTER SYMPTOMS
COLOR CHANGE: 0
SHORTNESS OF BREATH: 0
ABDOMINAL PAIN: 0
EYE REDNESS: 0

## 2023-03-14 NOTE — FLOWSHEET NOTE
Outpatient Physical Therapy  [place]           [x] Phone: [phone]   Fax: [phone]  [facility]           [] Phone: [phone]   Fax: [phone]        Physical Therapy Daily Treatment Note  Date:  3/14/2023    Patient Name:  Molina Jin    :  1970  MRN: [id number]  Restrictions/Precautions: n/a  Diagnosis:      L TKA  Date of Injury/Surgery: 23  Treatment Diagnosis:   L LE weakness, gait dysfunction, min pain   Insurance/Certification information:  [facility]  [address]   Referring Physician:     Kosta Nolan PA-C  Next Doctor Visit:  April (first week)  Plan of care signed (Y/N):  Y  Outcome Measure: KOOS:  disability   Visit# / total visits:  7/10  Pain level: 2/10 pain stinging 0/10 R knee pain       Goals:     Patient goals:  improve mobility   Short term goals  Time Frame for Short term goals:      Long Term Goals  Time Frame for Long Term Goals:        Long Term Goals: 5 weeks  23  Long Term Goal 1: Pt will demo I with HEP/symptom management. Long Term Goal 2: Pt will demo normal gait mechanics with min/no deficits to ease community mobility. Long Term Goal 3: Pt will demo 0-120 deg knee A/PROM to ease transfers. MET   Long Term Goal 4: Pt will completed TUG <14 sec to demo improved mobility. Partially MET   16.39 sec 3/7/23   Long Term Goal 5: Pt will demo <5/28 improvement per KOOS to demo improved functional mobility. Long Term Goal 6: Pt will demo 5x sit to stand <16 sec to demo improved LE strength and ease with transfers. Summary of Evaluation:   Pt is 46year old female with L TKA 23. Pt completed therapy home care prior to start of outpatient therapy. Pt now has difficulties completing stairs, stepping onto a curb, prolonged walking, community activity and ADLs. Pt demo deficits this date that include R knee stiffness, open/closed chain weakness and min pain.  Pt will benefit with PT services with progression of strength/ROM, manual and modalities to return to PLOF. Pt prior to onset of current condition had min/no pain with able to complete full ADLs and work activities. Patient received education on their current pathology and how their condition effects them with their functional activities. Patient understood discussion and questions were answered. Patient understands their activity limitations and understands rational for treatment progression. Subjective:  Pt stated her L knee pain 0/10 pain. She has surgery on April 14 th for her carpal tunnel. She didn't do HEP yesterday because she had a lot of doctor's appointment. Any changes in Ambulatory Summary Sheet? None        Objective: COVID screening questions were asked and patient attested that there had been no contact or symptoms      min antalgic gait with SPC with greater lateral lean  Full TKE with SLR with good technique. No pain L ankle after fitter LE press. Min challenge with step ups with L LE only Need for UE support    Full AROM with all exercises.        Exercises: (No more than 4 columns)   Exercise/Equipment 3/3/23 #5 3/7/23  #6 3/10/23  #7 3/14/23 #8         Week 9.5   WARM UP       Sci fit S15/A6 L3 5' Lv   5'  5'  5' L3          TABLE       *Heel slide X 10 AROM with OP GSB 10x2 with OP GSB 10x2 with OP    Quad set       SLR  2# 10x2 2# 10x2 2# 15x2   SAQ 3# 10*2      LAQ  3# 10*2 5# 10x2 5# 10x2 5# 15x2   Bridge   GSB 10x2 GSB 10x2    Fitter LE press   All cords 10x2 All cords 10x2   Resisted hamstring curls   RTB 10x2 RTB 15x2                                  STANDING       *Anterior step ups  6\" 2 x 10 BUE 5x2  6in  10x2  6in  L LE  10x2 6\" L LE   Lat step up 4\" x 10 BUE   10x2 6\" L LE   *STS 10*2 19\"  No UE  Excessive fwd flexion      *UE supported marches  Airex 20x      Lunge to step for flexion stretch        Shuttle 10* 2 2c DL   10*2  1c LLE L LE 2c 10x2 L LE 2c 10x2 L LE 2c 15x2   Lunge 10*                  PROPRIOCEPTION       Wobble board a/p m/l balance 1' ea. 30\"x2 30\"x2 each dir  1' ea. Steps taps on airex   1 finger 20x2     EC on airex    20\"x2 30s x 2   Airex marches    20x2 20x2   Perturbations on airex    20\"x3 20\"x3                        MODALITIES       vaso 10' -- 10' 10'              Other Therapeutic Activities/Education:      Home Exercise Program:  *HO issued, reviewed and discussed with patient. All questions answered. Pt agreed to comply. Manual Treatments:  none      Modalities:    Patient received vasocompression on their L knee for pain and inflammation for 10 min on low pressure. Patient had negative skin reaction afterwards. Girth measurements around extremity were pre: 40 cm/ post 39 cm  See subjective and assessment for pre and post treatment patient reported pain levels. Communication with other providers:  POC sent 2/21/23       Assessment: Pt demonstrated good tolerance to tx with increased reps today. Improving quad activation. Anticipate d/c in 2 visits with focus on strengthening. Pt would continue to benefit from skilled therapy interventions to address remaining impairments, improve mobility and strength and progress toward goal completion while reducing risk for re-injury or further decline. End session pain: 1/10    Plan for Next Session:  functional progression of closed chain activities, gait training, neuro re-ed to maximize function.      Time In / Time Out:  8562-9359      Timed Code/Total Treatment Minutes:   43/53'      1 TE 1 TA  1 Neuro     vaso 10'       Next Progress Note due:  re-eval 2/21/23      Plan of Care Interventions:  [x] Therapeutic Exercise  [x] Modalities:  [x] Therapeutic Activity     [] Ultrasound  [x] Estim  [x] Gait Training      [] Cervical Traction [] Lumbar Traction  [x] Neuromuscular Re-education    [x] Cold/hotpack [] Iontophoresis   [x] Instruction in HEP      [x] Vasopneumatic   [] Dry Needling    [x] Manual Therapy               [] Aquatic Therapy              Electronically signed by:  Lyndsay Walton PTA, CLT 3/14/2023 1:49 PM

## 2023-03-14 NOTE — PROGRESS NOTES
Subjective:      Patient ID: Alvarado Marte is a 46 y.o. female. Patient present to the office today for evaluation of the bilateral hand/wrist. Pt states L>R. Pt states she does have throbbing in the left wrist. Pt has tried braces with no relief      IMPRESSION:                  1.  Mildly abnormal EMG. 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 2.  No evidence of a concurrent cervical spinal nerve root injury (radiculopathy), plexopathy, generalized neuropathy or primary muscle disease    She comes in today for evaluation of her bilateral hand pain, left worse than right. She states that since her knee replacement on the left she has been doing very well and she is scheduled to have her right knee replaced however her hands are bothering her worse so she needs to get them fixed first.  She states that she has been dealing with worsening burning, tingling pain as well as aching pain in her left thumb and the fingers of her left hand. She is also been dealing with tingling and burning pain in her right hand but is not as bad as her left. She denies any recent injury to her wrists and denies any fever or chills. Review of Systems   Constitutional:  Negative for activity change, chills and fever. HENT:  Negative for congestion and drooling. Eyes:  Negative for redness. Respiratory:  Negative for shortness of breath. Cardiovascular:  Negative for chest pain. Gastrointestinal:  Negative for abdominal pain. Endocrine: Negative for cold intolerance and heat intolerance. Musculoskeletal:  Positive for arthralgias and myalgias. Negative for gait problem and joint swelling. Skin:  Negative for color change, pallor, rash and wound. Neurological:  Positive for weakness and numbness. Psychiatric/Behavioral:  Negative for confusion.       Past Medical History:   Diagnosis Date    Arthritis     Cancer [facility] network treatment Ovarian and endometrial CA    Diabetes ([facility] [address]     Endometrial cancer ([facility] [address]     H/O cardiovascular stress test 3/17/2014    EF58% normal study    H/O echocardiogram 03/17/2014    EF 55%, normal LV systolic function, mildly dilated left atrium, normal sized right ventricle, normal valves, mild mitral and tricuspid regurg. Ovarian cancer ([facility] [address]        Objective:   Physical Exam  Constitutional:       Appearance: She is well-developed. HENT:      Head: Normocephalic and atraumatic. Nose: No congestion. Eyes:      Pupils: Pupils are equal, round, and reactive to light. Pulmonary:      Effort: Pulmonary effort is normal.   Musculoskeletal:         General: No tenderness or deformity. Normal range of motion. Right wrist: Normal.      Left wrist: Normal.      Right hand: No swelling, deformity, lacerations, tenderness or bony tenderness. Normal range of motion. Decreased strength. Decreased sensation. Normal sensation of the ulnar distribution, median distribution and radial distribution. There is no disruption of two-point discrimination. Normal capillary refill. Left hand: No swelling, deformity, lacerations, tenderness or bony tenderness. Normal range of motion. Decreased strength. Decreased sensation. Normal sensation of the ulnar distribution, median distribution and radial distribution. There is no disruption of two-point discrimination. Normal capillary refill. Cervical back: Normal range of motion. Skin:     General: Skin is warm and dry. Capillary Refill: Capillary refill takes less than 2 seconds. Coloration: Skin is not pale. Findings: No erythema or rash. Neurological:      Mental Status: She is alert and oriented to person, place, and time. Sensory: Sensory deficit present. Left hand-Skin intact with no erythema, ecchymosis or lacerations present.  strength 4/5. Right hand-Skin intact with no erythema, ecchymosis or lacerations present.  strength 5/5.     XRAY  X-ray 3 views of the left wrist from September 15, 2022 reviewed by me today in the office demonstrates age appropriate bone density throughout with normal joint space and normal overall alignment, no subluxation or dislocation noted, no acute osseous abnormalities. Assessment:      Left carpal tunnel syndrome, mild  Right carpal tunnel syndrome, mild      Plan:      I discussed with her today her x-ray and EMG findings. I explained to her that she does have mild bilateral carpal tunnel syndrome. At this point given her worsening symptoms I recommend surgical treatment beginning with her more symptomatic left hand first.  I discussed with her today performing left carpal tunnel release. I explained risks, benefits, possible complications of the procedure and answered all questions for the patient. I explained postoperative rehabilitation protocol and expectations with the patient today. The patient understands and consents to the procedure. Patient will follow up with their primary care physician prior to surgical treatment for preoperative clearance. We will schedule surgery at soonest convenience. Continue weight-bearing as tolerated. Continue range of motion exercises as instructed. Ice and elevate as needed. Tylenol or Motrin for pain. Follow up in 2 weeks postop. We will monitor her right hand since she is having minimal symptoms on that side.             Edy [address], DO

## 2023-03-14 NOTE — TELEPHONE ENCOUNTER
Patient scheduled for    Left Carpal Tunnel Release   Date: 4/13/23  Facility: [facility]  Surgeon: Holly Gomez D.O    Surgery Request faxed 3/14/23  PCP Clearance faxed to Dr. Darrian Alfaro 3/14/23  Cardiac Clearance faxed to Dr. Keli Warner 3/14/23  Onc Clearance faxed to Dr. Allie Weber 3/14/23    Pre Op Appt 3/13/23    [employer] via portal on 3/14/23  Status: Approved CPT 00352 ICD G56.02 for outpatient  Auth# [id number]  Date span: 4/13/23-7/12/23    Phone PAT

## 2023-03-16 ENCOUNTER — TELEPHONE (OUTPATIENT)
Dept: CARDIOLOGY CLINIC | Age: 53
End: 2023-03-16

## 2023-03-16 NOTE — TELEPHONE ENCOUNTER
Cardiologist: Dr. Graciela Hwang  Surgeon: Dr. Gideon Canavan  Surgery: Left Carpal Tunnel Release/  Anesthesia: [address]  Date: 4/13/2023  FAX# [phone]  # [phone]    Last OV 12/21/2022 w/Rosy         Hypertension  Blood pressure generally well controlled currently not on any medication she is on Januvia. Dyslipidemia  All available lab work was reviewed. Patient was advised to repeat lab work before next visit. Necessary orders were placed , instructions given by myself      Obesity  BMI 60  Encouraged to exercise as ahe is able, even chair exercises. She is hoping knee replacement will alow her to be more mobile. Last EKG- 11/29/2022      NM-12/9/2022   ECG portion of stress test is negative for ischemia by diagnostic criteria. Normal EF 68 % with normal ventricular contractility. No infarct or ischemia    noted. Anterior wall defect consistent with breast artifact    No ischemia , Normal stress test         Echo- 12/9/2022   Left ventricular function and size is normal, EF is estimated at 55-60%. Mild left ventricular hypertrophy. Grade I diastolic dysfunction. Mildly dilated left atrium. Mitral annular calcification is present. No significant valvular regurgitation noted. No evidence of pericardial effusion.       Aspirin

## 2023-03-16 NOTE — TELEPHONE ENCOUNTER
Proceed with surgery no further testing needed     Low   risk for surgery  Hold aspirin for procedure

## 2023-03-17 ENCOUNTER — HOSPITAL ENCOUNTER (OUTPATIENT)
Dept: PHYSICAL THERAPY | Age: 53
Setting detail: THERAPIES SERIES
Discharge: HOME | End: 2023-03-17
Payer: COMMERCIAL

## 2023-03-17 PROCEDURE — 97112 NEUROMUSCULAR REEDUCATION: CPT

## 2023-03-17 PROCEDURE — 97110 THERAPEUTIC EXERCISES: CPT

## 2023-03-17 PROCEDURE — 97530 THERAPEUTIC ACTIVITIES: CPT

## 2023-03-17 NOTE — FLOWSHEET NOTE
Outpatient Physical Therapy  [place]           [x] Phone: [phone]   Fax: [phone]  [facility]           [] Phone: [phone]   Fax: [phone]        Physical Therapy Daily Treatment Note  Date:  3/17/2023    Patient Name:  Jaylan Wahl    :  1970  MRN: [id number]  Restrictions/Precautions: n/a  Diagnosis:      L TKA  Date of Injury/Surgery: 23  Treatment Diagnosis:   L LE weakness, gait dysfunction, min pain   Insurance/Certification information:  [facility]  39 y   Referring Physician:     Peyton Barba PA-C  Next Doctor Visit:  April (first week)  Plan of care signed (Y/N):  Y  Outcome Measure: KOOS:  disability   Visit# / total visits:  8/10  Pain level:  0/10 pain stinging 0/10 R knee pain       Goals:     Patient goals:  improve mobility   Short term goals  Time Frame for Short term goals:      Long Term Goals  Time Frame for Long Term Goals:        Long Term Goals: 5 weeks  23  Long Term Goal 1: Pt will demo I with HEP/symptom management. Long Term Goal 2: Pt will demo normal gait mechanics with min/no deficits to ease community mobility. Long Term Goal 3: Pt will demo 0-120 deg knee A/PROM to ease transfers. MET   Long Term Goal 4: Pt will completed TUG <14 sec to demo improved mobility. Partially MET   16.39 sec 3/7/23   Long Term Goal 5: Pt will demo <5/28 improvement per KOOS to demo improved functional mobility. Long Term Goal 6: Pt will demo 5x sit to stand <16 sec to demo improved LE strength and ease with transfers. Summary of Evaluation:   Pt is 46year old female with L TKA 23. Pt completed therapy home care prior to start of outpatient therapy. Pt now has difficulties completing stairs, stepping onto a curb, prolonged walking, community activity and ADLs. Pt demo deficits this date that include R knee stiffness, open/closed chain weakness and min pain.  Pt will benefit with PT services with progression of strength/ROM, manual and modalities to return to PLOF. Pt prior to onset of current condition had min/no pain with able to complete full ADLs and work activities. Patient received education on their current pathology and how their condition effects them with their functional activities. Patient understood discussion and questions were answered. Patient understands their activity limitations and understands rational for treatment progression. Subjective:  Pt stated her L knee pain 0/10 pain. She has surgery on April 14 th for her carpal tunnel. She didn't do HEP yesterday because she had a lot of doctor's appointment. Any changes in Ambulatory Summary Sheet? None        Objective: COVID screening questions were asked and patient attested that there had been no contact or symptoms      min antalgic gait without SPC with greater lateral lean  Full TKE with SLR with good technique. Min challenge with step ups with L LE only Need for UE support  Full AROM with all exercises.        Exercises: (No more than 4 columns)   Exercise/Equipment 3/10/23  #7 3/14/23 #8 3/17/23  #9       Week 9.5    WARM UP      Sci fit S15/A6 5'  5' L3 5' L3         TABLE      *Heel slide GSB 10x2 with OP     Quad set      SLR 2# 10x2 2# 15x2 2# 15x2   SAQ      LAQ  5# 10x2 5# 15x2 7# 15x2   Bridge  GSB 10x2  GSB 10x2   Fitter LE press All cords 10x2 All cords 10x2    Resisted hamstring curls RTB 10x2 RTB 15x2 GTB 10x2                              STANDING      *Anterior step ups  10x2  6in  L LE  10x2 6\" L LE    Lat step up  10x2 6\" L LE 10x2 6\" L LE   *STS      *UE supported marches       Lunge to step for flexion stretch       Shuttle L LE 2c 10x2 L LE 2c 15x2 L LE 2c 10x  3c 10x   Lunge                 PROPRIOCEPTION      Wobble board a/p m/l balance 30\"x2 each dir  1' ea. 30\"x3   Steps taps on airex    6in 10x2   EC on airex  20\"x2 30s x 2    Airex marches  20x2 20x2    Perturbations on airex  20\"x3 20\"x3 20\"x3   DLS on BOSU   04\"E3               MODALITIES vaso 10' 10' 10'             Other Therapeutic Activities/Education:      Home Exercise Program:  *HO issued, reviewed and discussed with patient. All questions answered. Pt agreed to comply. Manual Treatments:  none      Modalities:    Patient received vasocompression on their L knee for pain and inflammation for 10 min on low pressure. Patient had negative skin reaction afterwards. Girth measurements around extremity were pre: 42 cm/ post 40 cm  See subjective and assessment for pre and post treatment patient reported pain levels. Communication with other providers:  POC sent 2/21/23       Assessment: Pt demonstrated good tolerance to tx with increased reps today. Improving quad activation and closed chain tolerance. Anticipate d/c next visit with home program with focus on strengthening. Pt would continue to benefit from skilled therapy interventions to address remaining impairments, improve mobility and strength and progress toward goal completion while reducing risk for re-injury or further decline. End session pain: 1/10    Plan for Next Session:  functional progression of closed chain activities, gait training, neuro re-ed to maximize function.      Time In / Time Out:  3770-2154      Timed Code/Total Treatment Minutes:   41/51'       19'  TE 10' TA    12' neuro    vaso 10'       Next Progress Note due:  re-eval 2/21/23      Plan of Care Interventions:  [x] Therapeutic Exercise  [x] Modalities:  [x] Therapeutic Activity     [] Ultrasound  [x] Estim  [x] Gait Training      [] Cervical Traction [] Lumbar Traction  [x] Neuromuscular Re-education    [x] Cold/hotpack [] Iontophoresis   [x] Instruction in HEP      [x] Vasopneumatic   [] Dry Needling    [x] Manual Therapy               [] Aquatic Therapy              Electronically signed by:  Deborah Lang, PT, DPT, OCS     3/17/2023 8:13 AM

## 2023-03-20 ENCOUNTER — HOSPITAL ENCOUNTER (OUTPATIENT)
Dept: CT IMAGING | Age: 53
Discharge: HOME | End: 2023-03-20
Payer: COMMERCIAL

## 2023-03-20 DIAGNOSIS — N28.89 KIDNEY MASS: ICD-10-CM

## 2023-03-20 DIAGNOSIS — N28.89 OTHER SPECIFIED DISORDERS OF KIDNEY AND URETER: ICD-10-CM

## 2023-03-20 LAB
EGFR, POC: >60 ML/MIN/1.73M2
POC CREATININE: 0.8 MG/DL (ref 0.6–1.1)

## 2023-03-20 PROCEDURE — 82565 ASSAY OF CREATININE: CPT

## 2023-03-20 PROCEDURE — 6360000004 HC RX CONTRAST MEDICATION: Performed by: SPECIALIST

## 2023-03-20 PROCEDURE — 74170 CT ABD WO CNTRST FLWD CNTRST: CPT

## 2023-03-20 RX ORDER — SODIUM CHLORIDE 0.9 % (FLUSH) 0.9 %
5-40 SYRINGE (ML) INJECTION 2 TIMES DAILY
Status: DISCONTINUED | OUTPATIENT
Start: 2023-03-20 | End: 2023-03-21 | Stop reason: HOSPADM

## 2023-03-20 RX ADMIN — IOPAMIDOL 75 ML: 755 INJECTION, SOLUTION INTRAVENOUS at 12:50

## 2023-03-22 ENCOUNTER — HOSPITAL ENCOUNTER (OUTPATIENT)
Dept: PHYSICAL THERAPY | Age: 53
Setting detail: THERAPIES SERIES
Discharge: HOME | End: 2023-03-22
Payer: COMMERCIAL

## 2023-03-22 PROCEDURE — 97110 THERAPEUTIC EXERCISES: CPT

## 2023-03-22 PROCEDURE — 97530 THERAPEUTIC ACTIVITIES: CPT

## 2023-03-22 NOTE — DISCHARGE SUMMARY
with fear limiting stair negotiation. Pt demo improvements that include full knee AR OM, min weakness, low pain/disability. Pt demo independence with home program and will continue to address deficits with home program. Pt agreed to this plan. Goal Status:  [x] Achieved [] Partially Achieved  [] Not Achieved     Patient goals:  improve mobility MET   Short term goals  Time Frame for Short term goals:       Long Term Goals  Time Frame for Long Term Goals:     Long Term Goals: 5 weeks  4/1/23  Long Term Goal 1: Pt will demo I with HEP/symptom management. Long Term Goal 2: Pt will demo normal gait mechanics with min/no deficits to ease community mobility. Mostly MET   Long Term Goal 3: Pt will demo 0-120 deg knee A/PROM to ease transfers. MET   Long Term Goal 4: Pt will completed TUG <14 sec to demo improved mobility. MET  Long Term Goal 5: Pt will demo <5/28 improvement per KOOS to demo improved functional mobility. Mostly MET   Long Term Goal 6: Pt will demo 5x sit to stand <16 sec to demo improved LE strength and ease with transfers. MET            Patient Status: [] Continue per initial plan of Care     [x] Patient now discharged     [] Additional visits requested, Please re-certify for additional visits: If we are requesting more visits, we fully anticipate the patient's condition is expected to improve within the treatment timeframe we are requesting. Electronically signed by:  Rosenda Barrera, PT, DPT, OCS  3/22/2023, 12:56 PM    3/22/2023 12:56 PM   If you have any questions or concerns, please don't hesitate to call.   Thank you for your referral.    Physician Signature:______________________ Date:______ Time: ________  By signing above, therapists plan is approved by physician

## 2023-03-22 NOTE — FLOWSHEET NOTE
Outpatient Physical Therapy  [place]           [x] Phone: [phone]   Fax: [phone]  [facility]           [] Phone: [phone]   Fax: [phone]        Physical Therapy Daily Treatment Note  Date:  3/22/2023    Patient Name:  Freda Mcgee    :  1970  MRN: [id number]  Restrictions/Precautions: n/a  Diagnosis:      L TKA  Date of Injury/Surgery: 23  Treatment Diagnosis:   L LE weakness, gait dysfunction, min pain   Insurance/Certification information:  [facility]  [facility]   Referring Physician:     Gabriel Torres PA-C  Next Doctor Visit:  April (first week)  Plan of care signed (Y/N):  Y  Outcome Measure: KOOS:  disability   Visit# / total visits:  10/10  Pain level:  1 /10 pain       Goals:     Patient goals:  improve mobility MET   Short term goals  Time Frame for Short term goals:      Long Term Goals  Time Frame for Long Term Goals:        Long Term Goals: 5 weeks  23  Long Term Goal 1: Pt will demo I with HEP/symptom management. Long Term Goal 2: Pt will demo normal gait mechanics with min/no deficits to ease community mobility. Mostly MET   Long Term Goal 3: Pt will demo 0-120 deg knee A/PROM to ease transfers. MET   Long Term Goal 4: Pt will completed TUG <14 sec to demo improved mobility. MET  Long Term Goal 5: Pt will demo <5/28 improvement per KOOS to demo improved functional mobility. Mostly MET   Long Term Goal 6: Pt will demo 5x sit to stand <16 sec to demo improved LE strength and ease with transfers. MET              Summary of Evaluation:   Pt is 46year old female with L TKA 23. Pt completed therapy home care prior to start of outpatient therapy. Pt now has difficulties completing stairs, stepping onto a curb, prolonged walking, community activity and ADLs. Pt demo deficits this date that include R knee stiffness, open/closed chain weakness and min pain. Pt will benefit with PT services with progression of strength/ROM, manual and modalities to return to PLOF.  Pt

## 2023-04-21 ENCOUNTER — OFFICE VISIT (OUTPATIENT)
Dept: ORTHOPEDIC SURGERY | Age: 53
End: 2023-04-21

## 2023-04-21 VITALS — TEMPERATURE: 97.8 F | RESPIRATION RATE: 16 BRPM

## 2023-04-21 DIAGNOSIS — Z09 POSTOP CHECK: Primary | ICD-10-CM

## 2023-04-21 NOTE — PROGRESS NOTES
Date of surgery:   4/13/2023  Surgeon: Dr. Nathanael Cross    History:  Ms. Daisy Balbuena is here in follow up regarding her left carpal tunnel release. She states that she is not having any symptoms of carpal tunnel syndrome but she does still have soreness of course after the surgery around the incision. Physical:  Vitals:    04/21/23 1108   Resp: 16   Temp: 97.8 °F (36.6 °C)     Left carpal tunnel incision is well-healed. Mild ecchymosis noted in the palmar aspect of the left hand and wrist.  Sensation intact light touch. Impression: Status post left carpal tunnel release-doing well    Plan:  Follow-up with Dr. Nathanael Cross to discuss right carpal tunnel release. Patient Instructions   Sutures removed. Weight bearing as tolerated.

## 2023-04-21 NOTE — PROGRESS NOTES
Patient is here today for her 2 wk post op check for her left CTR completed 4/13/23. She states that she is doing well post op. Pain level 4/10.

## 2023-05-02 ENCOUNTER — TELEPHONE (OUTPATIENT)
Dept: ORTHOPEDIC SURGERY | Age: 53
End: 2023-05-02

## 2023-05-02 NOTE — TELEPHONE ENCOUNTER
Patient scheduled for    Right Total Knee Arthroplasty  Date: 7/13/23  Facility: [place]  Surgeon: Lenore Burciaga D.O  Product: Danica    Surgery Request and Pathway Orders faxed 4/11/23  PCP Clearance faxed to Dr. Helen Navarro 4/11/23  Cardiac Clearance faxed to Dr. Dagmar nAdrade 4/11/23  Onc Clearance faxed to Dr. Ben Moreno 4/11/23    Pre Op Appt 4/10/23    [address]  Contacted 5/2/23 via online portal with clinicals uploaded  Status: Pending CPT 42991 ICD M17.11/M25.561 for outpatient  Auth# [id number]     [facility] PAT    Pt requesting home health care/therapy upon d/c from hospital

## 2023-05-03 ENCOUNTER — OFFICE VISIT (OUTPATIENT)
Dept: ORTHOPEDIC SURGERY | Age: 53
End: 2023-05-03

## 2023-05-03 VITALS
HEIGHT: 64 IN | DIASTOLIC BLOOD PRESSURE: 84 MMHG | SYSTOLIC BLOOD PRESSURE: 122 MMHG | RESPIRATION RATE: 15 BRPM | BODY MASS INDEX: 50.02 KG/M2 | WEIGHT: 293 LBS

## 2023-05-03 DIAGNOSIS — Z98.890 S/P CARPAL TUNNEL RELEASE: Primary | ICD-10-CM

## 2023-05-03 DIAGNOSIS — G56.01 RIGHT CARPAL TUNNEL SYNDROME: ICD-10-CM

## 2023-05-03 ASSESSMENT — ENCOUNTER SYMPTOMS
ABDOMINAL PAIN: 0
COLOR CHANGE: 0
EYE REDNESS: 0
SHORTNESS OF BREATH: 0

## 2023-05-03 NOTE — PATIENT INSTRUCTIONS
We will schedule surgery at soonest convenience.  If you have any questions regarding your surgery please call our office and ask to speak with Isis Tilley [phone]

## 2023-05-03 NOTE — PROGRESS NOTES
Patient returns to the office today for FU of the right CTS. Pt states she is having pain mainly in the right thumb and will drop things. Pt states she does have some sharp stabbing pain in the right hand at times.  Pt states she is ready to discuss CTR on the right hand
no erythema, no drainage, and no signs of infection.  strength 5/5. Right hand-Skin intact with no erythema, ecchymosis or lacerations present.  strength 5/5. XR WRIST LEFT (MIN 3 VIEWS)    Result Date: 5/3/2023  XRAY X-ray   3 views of the left wrist obtained and reviewed by me today in the office demonstrates age appropriate bone density throughout with normal spaces and normal overall alignment, no subluxation or dislocation noted, no acute osseous abnormalities. Impression: Normal left wrist with no acute process. XR WRIST RIGHT (MIN 3 VIEWS)    Result Date: 5/3/2023  XRAY X-ray   3 views of the right wrist obtained and reviewed by me today in the office demonstrates age appropriate bone density throughout with normal spaces and normal overall alignment, no subluxation or dislocation noted, no acute osseous abnormalities. Impression: Normal right wrist with no acute process. Assessment:      Left carpal tunnel release, 4 weeks  Right carpal tunnel syndrome, mild      Plan:      I discussed with her today her x-ray and EMG findings. I discussed with her today in regards to her left hand that she is continuing to progress extremely well. I did discuss scar massage with her today. She can resume all activities with no restrictions with the left hand. In regards to her right hand given her persistent symptoms I recommend surgical treatment. I discussed with her today performing right carpal tunnel release. I explained risks, benefits, possible complications of the procedure and answered all questions for the patient. I explained postoperative rehabilitation protocol and expectations with the patient today. The patient understands and consents to the procedure. Patient will follow up with their primary care physician prior to surgical treatment for preoperative clearance. We will schedule surgery at soonest convenience. Continue weight-bearing as tolerated.   Continue

## 2023-05-04 ENCOUNTER — TELEPHONE (OUTPATIENT)
Dept: ORTHOPEDIC SURGERY | Age: 53
End: 2023-05-04

## 2023-05-04 NOTE — TELEPHONE ENCOUNTER
Patient scheduled for    Right Carpal Tunnel Release  Date: 5/30/23  Facility: [facility]  Surgeon: Adeola Oscar D.O    Surgery Request faxed 5/4/23  PCP Clearance from Dr. Radha Uribe from previous LT CTR  Cardiac Clearance from Dr. Bree Currie from previous LT [facility] 5/3/23    [address] 5/4/23 via online portal   Status: Approved CPT 29493 ICD G56.01 for outpatient  Auth# [id number]  Date Span: 5/30/23-8/28/23    Phone PAT

## 2023-05-04 NOTE — TELEPHONE ENCOUNTER
Received response from [facility] regarding RT TKA on 7/13/23    Status: Approved CPT 51308 for outpatient  Auth# [id number]  Date Span: 7/13/23-10/11/23  Scanned into media.

## 2023-05-22 ENCOUNTER — ANESTHESIA EVENT (OUTPATIENT)
Dept: OPERATING ROOM | Age: 53
End: 2023-05-22
Payer: COMMERCIAL

## 2023-05-30 ENCOUNTER — ANESTHESIA (OUTPATIENT)
Dept: OPERATING ROOM | Age: 53
End: 2023-05-30
Payer: COMMERCIAL

## 2023-05-30 ENCOUNTER — HOSPITAL ENCOUNTER (OUTPATIENT)
Age: 53
Setting detail: OUTPATIENT SURGERY
Discharge: HOME | End: 2023-05-30
Attending: ORTHOPAEDIC SURGERY | Admitting: ORTHOPAEDIC SURGERY
Payer: COMMERCIAL

## 2023-05-30 VITALS
TEMPERATURE: 97 F | DIASTOLIC BLOOD PRESSURE: 67 MMHG | HEIGHT: 64 IN | BODY MASS INDEX: 50.02 KG/M2 | HEART RATE: 73 BPM | WEIGHT: 293 LBS | RESPIRATION RATE: 16 BRPM | OXYGEN SATURATION: 98 % | SYSTOLIC BLOOD PRESSURE: 111 MMHG

## 2023-05-30 DIAGNOSIS — Z98.890 S/P CARPAL TUNNEL RELEASE: Primary | ICD-10-CM

## 2023-05-30 PROBLEM — G56.01 CARPAL TUNNEL SYNDROME ON RIGHT: Status: ACTIVE | Noted: 2023-05-30

## 2023-05-30 LAB — GLUCOSE BLD-MCNC: 125 MG/DL (ref 70–99)

## 2023-05-30 PROCEDURE — 6360000002 HC RX W HCPCS: Performed by: ORTHOPAEDIC SURGERY

## 2023-05-30 PROCEDURE — 6370000000 HC RX 637 (ALT 250 FOR IP): Performed by: ORTHOPAEDIC SURGERY

## 2023-05-30 PROCEDURE — 2709999900 HC NON-CHARGEABLE SUPPLY: Performed by: ORTHOPAEDIC SURGERY

## 2023-05-30 PROCEDURE — 2500000003 HC RX 250 WO HCPCS: Performed by: ORTHOPAEDIC SURGERY

## 2023-05-30 PROCEDURE — 7100000010 HC PHASE II RECOVERY - FIRST 15 MIN: Performed by: ORTHOPAEDIC SURGERY

## 2023-05-30 PROCEDURE — 3700000000 HC ANESTHESIA ATTENDED CARE: Performed by: ORTHOPAEDIC SURGERY

## 2023-05-30 PROCEDURE — 3600000012 HC SURGERY LEVEL 2 ADDTL 15MIN: Performed by: ORTHOPAEDIC SURGERY

## 2023-05-30 PROCEDURE — 7100000011 HC PHASE II RECOVERY - ADDTL 15 MIN: Performed by: ORTHOPAEDIC SURGERY

## 2023-05-30 PROCEDURE — 2580000003 HC RX 258: Performed by: ORTHOPAEDIC SURGERY

## 2023-05-30 PROCEDURE — 82962 GLUCOSE BLOOD TEST: CPT

## 2023-05-30 PROCEDURE — 3700000001 HC ADD 15 MINUTES (ANESTHESIA): Performed by: ORTHOPAEDIC SURGERY

## 2023-05-30 PROCEDURE — 6360000002 HC RX W HCPCS

## 2023-05-30 PROCEDURE — 3600000002 HC SURGERY LEVEL 2 BASE: Performed by: ORTHOPAEDIC SURGERY

## 2023-05-30 RX ORDER — KETOROLAC TROMETHAMINE 30 MG/ML
30 INJECTION, SOLUTION INTRAMUSCULAR; INTRAVENOUS EVERY 6 HOURS
Status: CANCELLED | OUTPATIENT
Start: 2023-05-30 | End: 2023-06-02

## 2023-05-30 RX ORDER — SODIUM CHLORIDE 0.9 % (FLUSH) 0.9 %
5-40 SYRINGE (ML) INJECTION PRN
Status: CANCELLED | OUTPATIENT
Start: 2023-05-30

## 2023-05-30 RX ORDER — ONDANSETRON 2 MG/ML
4 INJECTION INTRAMUSCULAR; INTRAVENOUS EVERY 6 HOURS PRN
Status: CANCELLED | OUTPATIENT
Start: 2023-05-30

## 2023-05-30 RX ORDER — FENTANYL CITRATE 50 UG/ML
INJECTION, SOLUTION INTRAMUSCULAR; INTRAVENOUS PRN
Status: DISCONTINUED | OUTPATIENT
Start: 2023-05-30 | End: 2023-05-30 | Stop reason: SDUPTHER

## 2023-05-30 RX ORDER — MEPERIDINE HYDROCHLORIDE 25 MG/ML
12.5 INJECTION INTRAMUSCULAR; INTRAVENOUS; SUBCUTANEOUS EVERY 5 MIN PRN
Status: CANCELLED | OUTPATIENT
Start: 2023-05-30

## 2023-05-30 RX ORDER — IPRATROPIUM BROMIDE AND ALBUTEROL SULFATE 2.5; .5 MG/3ML; MG/3ML
1 SOLUTION RESPIRATORY (INHALATION)
Status: CANCELLED | OUTPATIENT
Start: 2023-05-30 | End: 2023-05-31

## 2023-05-30 RX ORDER — OXYCODONE HYDROCHLORIDE 5 MG/1
5 TABLET ORAL
Status: CANCELLED | OUTPATIENT
Start: 2023-05-30

## 2023-05-30 RX ORDER — SODIUM CHLORIDE 9 MG/ML
INJECTION, SOLUTION INTRAVENOUS PRN
Status: CANCELLED | OUTPATIENT
Start: 2023-05-30

## 2023-05-30 RX ORDER — SODIUM CHLORIDE, SODIUM LACTATE, POTASSIUM CHLORIDE, CALCIUM CHLORIDE 600; 310; 30; 20 MG/100ML; MG/100ML; MG/100ML; MG/100ML
INJECTION, SOLUTION INTRAVENOUS CONTINUOUS
Status: DISCONTINUED | OUTPATIENT
Start: 2023-05-30 | End: 2023-05-30 | Stop reason: HOSPADM

## 2023-05-30 RX ORDER — OXYCODONE HYDROCHLORIDE 5 MG/1
5 TABLET ORAL EVERY 4 HOURS PRN
Status: CANCELLED | OUTPATIENT
Start: 2023-05-30

## 2023-05-30 RX ORDER — PROPOFOL 10 MG/ML
INJECTION, EMULSION INTRAVENOUS PRN
Status: DISCONTINUED | OUTPATIENT
Start: 2023-05-30 | End: 2023-05-30 | Stop reason: SDUPTHER

## 2023-05-30 RX ORDER — MIDAZOLAM HYDROCHLORIDE 1 MG/ML
INJECTION INTRAMUSCULAR; INTRAVENOUS PRN
Status: DISCONTINUED | OUTPATIENT
Start: 2023-05-30 | End: 2023-05-30 | Stop reason: SDUPTHER

## 2023-05-30 RX ORDER — SODIUM CHLORIDE 9 MG/ML
INJECTION, SOLUTION INTRAVENOUS PRN
Status: DISCONTINUED | OUTPATIENT
Start: 2023-05-30 | End: 2023-05-30 | Stop reason: HOSPADM

## 2023-05-30 RX ORDER — LIDOCAINE HYDROCHLORIDE 20 MG/ML
INJECTION, SOLUTION INTRAVENOUS PRN
Status: DISCONTINUED | OUTPATIENT
Start: 2023-05-30 | End: 2023-05-30 | Stop reason: SDUPTHER

## 2023-05-30 RX ORDER — SODIUM CHLORIDE 0.9 % (FLUSH) 0.9 %
5-40 SYRINGE (ML) INJECTION EVERY 12 HOURS SCHEDULED
Status: CANCELLED | OUTPATIENT
Start: 2023-05-30

## 2023-05-30 RX ORDER — ONDANSETRON 4 MG/1
4 TABLET, ORALLY DISINTEGRATING ORAL EVERY 8 HOURS PRN
Status: CANCELLED | OUTPATIENT
Start: 2023-05-30

## 2023-05-30 RX ORDER — LIDOCAINE HYDROCHLORIDE 10 MG/ML
INJECTION, SOLUTION INFILTRATION; PERINEURAL
Status: COMPLETED | OUTPATIENT
Start: 2023-05-30 | End: 2023-05-30

## 2023-05-30 RX ORDER — PANTOPRAZOLE SODIUM 40 MG/1
40 TABLET, DELAYED RELEASE ORAL DAILY
COMMUNITY

## 2023-05-30 RX ORDER — HYDROCODONE BITARTRATE AND ACETAMINOPHEN 5; 325 MG/1; MG/1
1 TABLET ORAL EVERY 6 HOURS PRN
Qty: 5 TABLET | Refills: 0 | Status: SHIPPED | OUTPATIENT
Start: 2023-05-30 | End: 2023-06-02

## 2023-05-30 RX ORDER — SODIUM CHLORIDE, SODIUM LACTATE, POTASSIUM CHLORIDE, CALCIUM CHLORIDE 600; 310; 30; 20 MG/100ML; MG/100ML; MG/100ML; MG/100ML
INJECTION, SOLUTION INTRAVENOUS CONTINUOUS
Status: CANCELLED | OUTPATIENT
Start: 2023-05-30

## 2023-05-30 RX ORDER — OXYCODONE HYDROCHLORIDE 5 MG/1
10 TABLET ORAL EVERY 4 HOURS PRN
Status: CANCELLED | OUTPATIENT
Start: 2023-05-30

## 2023-05-30 RX ORDER — CEPHALEXIN 250 MG/1
250 CAPSULE ORAL 4 TIMES DAILY
Qty: 4 CAPSULE | Refills: 0 | Status: SHIPPED | OUTPATIENT
Start: 2023-05-30 | End: 2023-05-31

## 2023-05-30 RX ORDER — SODIUM CHLORIDE 0.9 % (FLUSH) 0.9 %
5-40 SYRINGE (ML) INJECTION EVERY 12 HOURS SCHEDULED
Status: DISCONTINUED | OUTPATIENT
Start: 2023-05-30 | End: 2023-05-30 | Stop reason: HOSPADM

## 2023-05-30 RX ORDER — SODIUM CHLORIDE 0.9 % (FLUSH) 0.9 %
5-40 SYRINGE (ML) INJECTION PRN
Status: DISCONTINUED | OUTPATIENT
Start: 2023-05-30 | End: 2023-05-30 | Stop reason: HOSPADM

## 2023-05-30 RX ORDER — ACETAMINOPHEN 500 MG
1000 TABLET ORAL ONCE
Status: COMPLETED | OUTPATIENT
Start: 2023-05-30 | End: 2023-05-30

## 2023-05-30 RX ORDER — OXYCODONE HYDROCHLORIDE 5 MG/1
10 TABLET ORAL PRN
Status: CANCELLED | OUTPATIENT
Start: 2023-05-30

## 2023-05-30 RX ADMIN — PROPOFOL 150 MG: 10 INJECTION, EMULSION INTRAVENOUS at 07:35

## 2023-05-30 RX ADMIN — FENTANYL CITRATE 50 MCG: 50 INJECTION, SOLUTION INTRAMUSCULAR; INTRAVENOUS at 07:35

## 2023-05-30 RX ADMIN — ACETAMINOPHEN 1000 MG: 500 TABLET ORAL at 06:30

## 2023-05-30 RX ADMIN — FENTANYL CITRATE 50 MCG: 50 INJECTION, SOLUTION INTRAMUSCULAR; INTRAVENOUS at 07:41

## 2023-05-30 RX ADMIN — LIDOCAINE HYDROCHLORIDE 50 MG: 20 INJECTION, SOLUTION INTRAVENOUS at 07:35

## 2023-05-30 RX ADMIN — SODIUM CHLORIDE, POTASSIUM CHLORIDE, SODIUM LACTATE AND CALCIUM CHLORIDE: 600; 310; 30; 20 INJECTION, SOLUTION INTRAVENOUS at 06:25

## 2023-05-30 RX ADMIN — MIDAZOLAM 2 MG: 1 INJECTION INTRAMUSCULAR; INTRAVENOUS at 07:26

## 2023-05-30 RX ADMIN — CEFAZOLIN SODIUM 3000 MG: 10 INJECTION, POWDER, FOR SOLUTION INTRAVENOUS at 07:34

## 2023-05-30 ASSESSMENT — PAIN SCALES - GENERAL
PAINLEVEL_OUTOF10: 0
PAINLEVEL_OUTOF10: 0
PAINLEVEL_OUTOF10: 2

## 2023-05-30 ASSESSMENT — PAIN DESCRIPTION - LOCATION: LOCATION: HAND

## 2023-05-30 ASSESSMENT — PAIN - FUNCTIONAL ASSESSMENT
PAIN_FUNCTIONAL_ASSESSMENT: 0-10
PAIN_FUNCTIONAL_ASSESSMENT: ACTIVITIES ARE NOT PREVENTED

## 2023-05-30 ASSESSMENT — PAIN DESCRIPTION - DESCRIPTORS
DESCRIPTORS: SORE
DESCRIPTORS: SORE

## 2023-05-30 ASSESSMENT — PAIN DESCRIPTION - ORIENTATION: ORIENTATION: RIGHT

## 2023-05-30 NOTE — BRIEF OP NOTE
Brief Postoperative Note      Patient: Bethena Simmonds  YOB: 1970  MRN: [id number]    Date of Procedure: 5/30/2023    Pre-Op Diagnosis Codes:     * Carpal tunnel syndrome on right [G56.01]    Post-Op Diagnosis: Same       Procedure(s):  RIGHT CARPAL TUNNEL RELEASE    Surgeon(s):  Sergio Alfredo DO    Assistant:  First Assistant: Mingo Ly PA-C    Anesthesia: Monitor Anesthesia Care    Estimated Blood Loss (mL): Minimal    Complications: None    Specimens:   * No specimens in log *    Implants:  * No implants in log *      Drains: * No LDAs found *    Findings: R CTS      Electronically signed by Marily Yu DO on 5/30/2023 at 7:54 AM

## 2023-05-30 NOTE — ANESTHESIA POSTPROCEDURE EVALUATION
Department of Anesthesiology  Postprocedure Note    Patient: Lazarus Bame  MRN: [id number]  YOB: 1970  Date of evaluation: 5/30/2023      Procedure Summary     Date: 05/30/23 Room / Location: [address]    Anesthesia Start: 0730 Anesthesia Stop: 0802    Procedure: RIGHT CARPAL TUNNEL RELEASE (Right: Wrist) Diagnosis:       Carpal tunnel syndrome on right      (Carpal tunnel syndrome on right [G56.01])    Surgeons: Gerald Smith DO Responsible Provider: Jonah Narvaez MD    Anesthesia Type: MAC ASA Status: 3          Anesthesia Type: No value filed.     Peter Phase I: Peter Score: 10    Peter Phase II:        Anesthesia Post Evaluation    Patient location during evaluation: bedside  Patient participation: complete - patient participated  Level of consciousness: awake and awake and alert  Pain score: 0  Airway patency: patent  Nausea & Vomiting: no nausea and no vomiting  Complications: no  Cardiovascular status: blood pressure returned to baseline and hemodynamically stable  Respiratory status: acceptable, spontaneous ventilation and room air  Hydration status: euvolemic

## 2023-05-30 NOTE — H&P
Subjective:      Patient ID: Aba Figueroa is a 46 y.o. female. Patient returns to the office today for FU of the right CTS. Pt states she is having pain mainly in the right thumb and will drop things. Pt states she does have some sharp stabbing pain in the right hand at times. Pt states she is ready to discuss CTR on the right hand     IMPRESSION:                  1.  Mildly abnormal EMG. 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 2.  No evidence of a concurrent cervical spinal nerve root injury (radiculopathy), plexopathy, generalized neuropathy or primary muscle disease     She comes in today for her 4-week postop recheck after left carpal tunnel release. She states that overall she is feeling much better and is no longer having numbness and tingling in the left hand. She does continue to have some tenderness over the scar but this is getting better. At this point her main complaint is persistent and worsening numbness and tingling in her right hand. She has not been dropping things with her right hand and is having weakness with  strength in the right hand. She denies any new injury to the right hand. She would like to discuss proceeding with surgical treatment for her right carpal tunnel syndrome. Review of Systems   Constitutional:  Negative for activity change, chills and fever. HENT:  Negative for congestion and drooling. Eyes:  Negative for redness. Respiratory:  Negative for shortness of breath. Cardiovascular:  Negative for chest pain. Gastrointestinal:  Negative for abdominal pain. Endocrine: Negative for cold intolerance and heat intolerance. Musculoskeletal:  Positive for arthralgias and myalgias. Negative for gait problem and joint swelling. Skin:  Negative for color change, pallor, rash and wound. Neurological:  Positive for weakness and numbness. Psychiatric/Behavioral:  Negative for confusion.        Past Medical

## 2023-05-30 NOTE — DISCHARGE INSTRUCTIONS
[facility]  [phone]    Do not drive, work around [address] [place], [place] [address]th  or use equipment. Do not drink any alcoholic beverages. Do not smoke while alone. Avoid making important decisions. Plan to spend a quiet, relaxed evening @ home. Resume normal activities as you begin to feel better. Eat lightly for your first meal, then gradually increase your diet to what is normal for you. In case of nausea, avoid food and drink only clear liquids. Resume food as nausea ceases. Notify your surgeon if you experience fever, chills, large amount of bleeding, difficulty breathing, persistent nausea and vomiting or any other disturbing problem. Call for a follow-up appointment with your surgeon.

## 2023-05-30 NOTE — ANESTHESIA PRE PROCEDURE
Department of Anesthesiology  Preprocedure Note       Name:  Delia Haines   Age:  46 y.o.  :  1970                                          MRN:  [id number]         Date:  2023      Surgeon: Awais Espitia):  Racheal Ibanez DO    Procedure: RIGHT CARPAL TUNNEL RELEASE (Right)    Medications prior to admission:   Prior to Admission medications    Medication Sig Start Date End Date Taking? Authorizing Provider   pantoprazole (PROTONIX) 40 MG tablet Take 1 tablet by mouth daily    Historical Provider, MD   TRULICITY 6.97 IA/8.0LN SOPN 0.75 mg once a week Takes on Friday 9/3/22   Historical Provider, MD   metFORMIN (GLUCOPHAGE) 850 MG tablet 2 times daily 9/3/22   Historical Provider, MD   atorvastatin (LIPITOR) 20 MG tablet Take 1 tablet by mouth daily 22  Historical Provider, MD   glyBURIDE (DIABETA) 5 MG tablet 2 tablets 2 times daily (with meals) 14   Historical Provider, MD       Current medications:    No current facility-administered medications for this visit. No current outpatient medications on file.      Facility-Administered Medications Ordered in Other Visits   Medication Dose Route Frequency Provider Last Rate Last Admin    lactated ringers IV soln infusion   IntraVENous Continuous Tariq Mccormick MD        lactated ringers IV soln infusion   IntraVENous Continuous Racheal Ibanez DO        sodium chloride flush 0.9 % injection 5-40 mL  5-40 mL IntraVENous 2 times per day Racheal Ibanez, DO        sodium chloride flush 0.9 % injection 5-40 mL  5-40 mL IntraVENous PRN Racheal Ibanez, DO        0.9 % sodium chloride infusion   IntraVENous PRN Racheal Ibanez, DO        ceFAZolin (ANCEF) 3,000 mg in sodium chloride 0.9 % 100 mL IVPB  3,000 mg IntraVENous On Call to mason Ceasar 10, DO           Allergies:  No Known Allergies    Problem List:    Patient Active Problem List   Diagnosis Code    Chest pain R07.9    Abnormal EKG R94.31    FH: CAD (coronary artery

## 2023-05-30 NOTE — OP NOTE
DATE OF PROCEDURE:  5/30/2023    PREOPERATIVE DIAGNOSES:  1. Right carpal tunnel syndrome. POSTOPERATIVE DIAGNOSES:  1. Right carpal tunnel syndrome. PROCEDURES:  1. Right carpal tunnel release. ATTENDING SURGEON:  Sergio Alfredo DO    FIRST ASSISTANT: KIARA Ramos    ANESTHESIA:  MAC with local.    ESTIMATED BLOOD LOSS:  1 mL. TOTAL TOURNIQUET TIME:  9 minutes. FLUIDS:  300 mL of crystalloids. INDICATIONS FOR PROCEDURE:  The patient is a 45-year-old female with a  long-standing history of worsening, painful numbness and tingling in her  right hand. For that, she underwent conservative treatment with no relief of  symptoms. EMG was subsequently obtained, which showed evidence of  carpal tunnel syndrome. Given her persistent symptoms despite conservative treatment and with her EMG findings,   I recommended surgical treatment. I explained the risks, benefits and possible complications of the procedure  to the patient and after answering all of her questions, she consented to  undergo the above procedure. DESCRIPTION OF PROCEDURE:  The patient was seen and evaluated in the  preoperative holding area where the right upper extremity was signed in her  presence. At this point, care of the patient was turned over to anesthesia  team, was transported back to the operative suite. She was placed supine  on the operating table with the right upper extremity on arm board. MAC  anesthesia was applied and once adequate anesthesia was obtained, the right  upper extremity was prepped and draped in usual sterile fashion. Preoperative antibiotics were administered. At this point, a time-out was  performed and all in attendance were in agreement. I marked out the planned surgical incision overlying the volar aspect of  the right wrist along the radial border of the ring finger proximal to  Levine, Susan. \[facility].\"" cardinal line.     I then injected approximately 2 mL of 1% plain lidocaine around the   carpal

## 2023-05-30 NOTE — PROGRESS NOTES
0802 Pt. Brought back to unit, bedside report received from [facility]. Pt. Is A&O, VSS, able to wiggle fingers to R hand and warm to touch. Pt. Denies pain to R hand. Pt. Provided with crackers and beverage, call light in reach. Family at bedside, call light in reach. 0820 DC instructions reviewed with pt and family/friend. Pt. Expresses understanding. 0835Pt. To DC home in private vehicle.

## 2023-05-31 DIAGNOSIS — Z98.890 S/P CARPAL TUNNEL RELEASE: Primary | ICD-10-CM

## 2023-05-31 RX ORDER — HYDROCODONE BITARTRATE AND ACETAMINOPHEN 5; 325 MG/1; MG/1
1 TABLET ORAL EVERY 6 HOURS PRN
Qty: 5 TABLET | Refills: 0 | Status: SHIPPED | OUTPATIENT
Start: 2023-05-31 | End: 2023-06-07

## 2023-05-31 NOTE — TELEPHONE ENCOUNTER
Please advise patient is wanting the prescription sent into [facility] on [facility]. MARK Ninajasmeet Chris is currently out of the medication.

## 2023-06-07 ENCOUNTER — OFFICE VISIT (OUTPATIENT)
Dept: ORTHOPEDIC SURGERY | Age: 53
End: 2023-06-07

## 2023-06-07 VITALS
OXYGEN SATURATION: 98 % | HEIGHT: 64 IN | TEMPERATURE: 97.7 F | WEIGHT: 293 LBS | HEART RATE: 83 BPM | RESPIRATION RATE: 16 BRPM | BODY MASS INDEX: 50.02 KG/M2

## 2023-06-07 DIAGNOSIS — Z98.890 S/P CARPAL TUNNEL RELEASE: Primary | ICD-10-CM

## 2023-06-07 PROCEDURE — 99024 POSTOP FOLLOW-UP VISIT: CPT | Performed by: PHYSICIAN ASSISTANT

## 2023-06-07 NOTE — PATIENT INSTRUCTIONS
Sutures/Stitches removed in office on Friday. Come back on Friday in [place] for a Nurse Visit  Avoid any direct pressure down onto the palm of the hand  Gentle massage the incision to help reduce scar tissue build up  Use a stress ball or a pair of matched socks to help with exercises of the hand and fingers  Ice and elevate as needed  Use Tylenol and Motrin as needed for pain  Follow up in 4-5 weeks with Dr. Lizz Crawford    We are committed to providing you the best care possible. If you receive a survey after visiting one of our offices, please take time to share your experience concerning your physician office visit. These surveys are confidential and no health information about you is shared. We are eager to improve for you and we are counting on your feedback to help make that happen.
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
## 2023-06-29 ENCOUNTER — HOSPITAL ENCOUNTER (OUTPATIENT)
Age: 53
Discharge: HOME | End: 2023-06-29
Payer: COMMERCIAL

## 2023-06-29 DIAGNOSIS — Z01.818 PRE-OP TESTING: ICD-10-CM

## 2023-06-29 LAB
ALBUMIN SERPL-MCNC: 4 GM/DL (ref 3.4–5)
ALP BLD-CCNC: 139 IU/L (ref 40–128)
ALT SERPL-CCNC: 15 U/L (ref 10–40)
ANION GAP SERPL CALCULATED.3IONS-SCNC: 15 MMOL/L (ref 4–16)
AST SERPL-CCNC: 14 IU/L (ref 15–37)
BASOPHILS ABSOLUTE: 0 K/CU MM
BASOPHILS RELATIVE PERCENT: 0.6 % (ref 0–1)
BILIRUB SERPL-MCNC: 0.3 MG/DL (ref 0–1)
BILIRUBIN URINE: NEGATIVE MG/DL
BLOOD, URINE: NEGATIVE
BUN SERPL-MCNC: 15 MG/DL (ref 6–23)
CALCIUM SERPL-MCNC: 9.3 MG/DL (ref 8.3–10.6)
CHLORIDE BLD-SCNC: 101 MMOL/L (ref 99–110)
CLARITY: CLEAR
CO2: 24 MMOL/L (ref 21–32)
COLOR: YELLOW
COMMENT UA: ABNORMAL
CREAT SERPL-MCNC: 0.8 MG/DL (ref 0.6–1.1)
DIFFERENTIAL TYPE: ABNORMAL
EKG ATRIAL RATE: 78 BPM
EKG DIAGNOSIS: NORMAL
EKG P AXIS: 22 DEGREES
EKG P-R INTERVAL: 154 MS
EKG Q-T INTERVAL: 392 MS
EKG QRS DURATION: 92 MS
EKG QTC CALCULATION (BAZETT): 446 MS
EKG R AXIS: -4 DEGREES
EKG T AXIS: 36 DEGREES
EKG VENTRICULAR RATE: 78 BPM
EOSINOPHILS ABSOLUTE: 0.1 K/CU MM
EOSINOPHILS RELATIVE PERCENT: 2.3 % (ref 0–3)
ERYTHROCYTE SEDIMENTATION RATE: 20 MM/HR (ref 0–30)
GFR SERPL CREATININE-BSD FRML MDRD: >60 ML/MIN/1.73M2
GLUCOSE SERPL-MCNC: 89 MG/DL (ref 70–99)
GLUCOSE, URINE: NEGATIVE MG/DL
HCT VFR BLD CALC: 37.6 % (ref 37–47)
HEMOGLOBIN: 11.7 GM/DL (ref 12.5–16)
IMMATURE NEUTROPHIL %: 0.4 % (ref 0–0.43)
KETONES, URINE: ABNORMAL MG/DL
LEUKOCYTE ESTERASE, URINE: NEGATIVE
LYMPHOCYTES ABSOLUTE: 1.1 K/CU MM
LYMPHOCYTES RELATIVE PERCENT: 21.9 % (ref 24–44)
MCH RBC QN AUTO: 27.3 PG (ref 27–31)
MCHC RBC AUTO-ENTMCNC: 31.1 % (ref 32–36)
MCV RBC AUTO: 87.9 FL (ref 78–100)
MONOCYTES ABSOLUTE: 0.3 K/CU MM
MONOCYTES RELATIVE PERCENT: 6.2 % (ref 0–4)
NITRITE URINE, QUANTITATIVE: NEGATIVE
NUCLEATED RBC %: 0 %
PDW BLD-RTO: 14.1 % (ref 11.7–14.9)
PH, URINE: 5 (ref 5–8)
PLATELET # BLD: 255 K/CU MM (ref 140–440)
PMV BLD AUTO: 9 FL (ref 7.5–11.1)
POTASSIUM SERPL-SCNC: 4.1 MMOL/L (ref 3.5–5.1)
PROTEIN UA: NEGATIVE MG/DL
RBC # BLD: 4.28 M/CU MM (ref 4.2–5.4)
SEGMENTED NEUTROPHILS ABSOLUTE COUNT: 3.3 K/CU MM
SEGMENTED NEUTROPHILS RELATIVE PERCENT: 68.6 % (ref 36–66)
SODIUM BLD-SCNC: 140 MMOL/L (ref 135–145)
SPECIFIC GRAVITY UA: >1.03 (ref 1–1.03)
TOTAL IMMATURE NEUTOROPHIL: 0.02 K/CU MM
TOTAL NUCLEATED RBC: 0 K/CU MM
TOTAL PROTEIN: 6.4 GM/DL (ref 6.4–8.2)
UROBILINOGEN, URINE: 0.2 MG/DL (ref 0.2–1)
WBC # BLD: 4.9 K/CU MM (ref 4–10.5)

## 2023-06-29 PROCEDURE — 81003 URINALYSIS AUTO W/O SCOPE: CPT

## 2023-06-29 PROCEDURE — 85652 RBC SED RATE AUTOMATED: CPT

## 2023-06-29 PROCEDURE — 87086 URINE CULTURE/COLONY COUNT: CPT

## 2023-06-29 PROCEDURE — 80053 COMPREHEN METABOLIC PANEL: CPT

## 2023-06-29 PROCEDURE — 93005 ELECTROCARDIOGRAM TRACING: CPT | Performed by: ORTHOPAEDIC SURGERY

## 2023-06-29 PROCEDURE — 85025 COMPLETE CBC W/AUTO DIFF WBC: CPT

## 2023-06-29 PROCEDURE — 36415 COLL VENOUS BLD VENIPUNCTURE: CPT

## 2023-06-30 LAB
CULTURE: NORMAL
Lab: NORMAL
SPECIMEN: NORMAL

## 2023-07-03 ENCOUNTER — ANESTHESIA EVENT (OUTPATIENT)
Dept: OPERATING ROOM | Age: 53
End: 2023-07-03
Payer: COMMERCIAL

## 2023-07-10 ENCOUNTER — OFFICE VISIT (OUTPATIENT)
Dept: ORTHOPEDIC SURGERY | Age: 53
End: 2023-07-10

## 2023-07-10 VITALS — OXYGEN SATURATION: 98 % | HEART RATE: 83 BPM | RESPIRATION RATE: 16 BRPM | HEIGHT: 64 IN | BODY MASS INDEX: 57.85 KG/M2

## 2023-07-10 DIAGNOSIS — Z98.890 S/P CARPAL TUNNEL RELEASE: Primary | ICD-10-CM

## 2023-07-10 PROCEDURE — 99024 POSTOP FOLLOW-UP VISIT: CPT | Performed by: ORTHOPAEDIC SURGERY

## 2023-07-10 ASSESSMENT — ENCOUNTER SYMPTOMS
EYE REDNESS: 0
SHORTNESS OF BREATH: 0
ABDOMINAL PAIN: 0
COLOR CHANGE: 0

## 2023-07-10 NOTE — PROGRESS NOTES
Patient seen in office today for 6wk post op RT CTR DOS 5/30/23. Having intermittent pain in thumb. Tingling is gone in fingers. 0/10 pain level.

## 2023-07-10 NOTE — PATIENT INSTRUCTIONS
Continue weight-bearing as tolerated. Continue range of motion exercises as instructed. Ice and elevate as needed. Tylenol or Motrin for pain. Follow up as needed. We are committed to providing you the best care possible. If you receive a survey after visiting one of our offices, please take time to share your experience concerning your physician office visit. These surveys are confidential and no health information about you is shared. We are eager to improve for you and we are counting on your feedback to help make that happen.

## 2023-07-10 NOTE — PROGRESS NOTES
Subjective:      Patient ID: Baron Silverman is a 46 y.o. female. Patient seen in office today for 6wk post op RT CTR DOS 5/30/23. Having intermittent pain in thumb. Tingling is gone in fingers. 0/10 pain level. She comes in today for her 6-week postop recheck. Overall she states that she is feeling much better and is not having any numbness or tingling in either of her hands. Patient denies any new injury to the involved extremity/ joint, denies numbness or tingling in the involved extremity and denies fever or chills. Review of Systems   Constitutional:  Negative for activity change, chills and fever. HENT:  Negative for congestion and drooling. Eyes:  Negative for redness. Respiratory:  Negative for shortness of breath. Cardiovascular:  Negative for chest pain. Gastrointestinal:  Negative for abdominal pain. Endocrine: Negative for cold intolerance and heat intolerance. Musculoskeletal:  Negative for arthralgias, gait problem, joint swelling and myalgias. Skin:  Negative for color change, pallor, rash and wound. Neurological:  Negative for weakness and numbness. Psychiatric/Behavioral:  Negative for confusion. Past Medical History:   Diagnosis Date    Arthritis     Cancer ([address])     [facility] Ovarian and endometrial CA    Diabetes ([address])     Endometrial cancer ([address])     GERD (gastroesophageal reflux disease)     H/O cardiovascular stress test 03/17/2014    EF58% normal study    H/O echocardiogram 03/17/2014    EF 55%, normal LV systolic function, mildly dilated left atrium, normal sized right ventricle, normal valves, mild mitral and tricuspid regurg. Hyperlipidemia     Ovarian cancer ([address])        Objective:   Physical Exam  Constitutional:       Appearance: She is well-developed. HENT:      Head: Normocephalic and atraumatic. Nose: No congestion. Eyes:      Pupils: Pupils are equal, round, and reactive to light.    Pulmonary:      Effort:

## 2023-07-13 ENCOUNTER — ANESTHESIA (OUTPATIENT)
Dept: OPERATING ROOM | Age: 53
End: 2023-07-13
Payer: COMMERCIAL

## 2023-07-13 ENCOUNTER — HOSPITAL ENCOUNTER (OUTPATIENT)
Age: 53
Discharge: HOME HEALTH | End: 2023-07-14
Attending: ORTHOPAEDIC SURGERY | Admitting: ORTHOPAEDIC SURGERY
Payer: COMMERCIAL

## 2023-07-13 ENCOUNTER — APPOINTMENT (OUTPATIENT)
Dept: GENERAL RADIOLOGY | Age: 53
End: 2023-07-13
Attending: ORTHOPAEDIC SURGERY
Payer: COMMERCIAL

## 2023-07-13 DIAGNOSIS — Z01.818 PRE-OP TESTING: Primary | ICD-10-CM

## 2023-07-13 DIAGNOSIS — Z96.651 STATUS POST RIGHT KNEE REPLACEMENT: ICD-10-CM

## 2023-07-13 LAB
GLUCOSE BLD-MCNC: 115 MG/DL (ref 70–99)
GLUCOSE BLD-MCNC: 256 MG/DL (ref 70–99)
GLUCOSE BLD-MCNC: 296 MG/DL (ref 70–99)
GLUCOSE BLD-MCNC: 82 MG/DL (ref 70–99)

## 2023-07-13 PROCEDURE — C1776 JOINT DEVICE (IMPLANTABLE): HCPCS | Performed by: ORTHOPAEDIC SURGERY

## 2023-07-13 PROCEDURE — 6370000000 HC RX 637 (ALT 250 FOR IP): Performed by: NURSE PRACTITIONER

## 2023-07-13 PROCEDURE — 6360000002 HC RX W HCPCS: Performed by: ORTHOPAEDIC SURGERY

## 2023-07-13 PROCEDURE — 6370000000 HC RX 637 (ALT 250 FOR IP): Performed by: ORTHOPAEDIC SURGERY

## 2023-07-13 PROCEDURE — 2720000010 HC SURG SUPPLY STERILE: Performed by: ORTHOPAEDIC SURGERY

## 2023-07-13 PROCEDURE — 3600000005 HC SURGERY LEVEL 5 BASE: Performed by: ORTHOPAEDIC SURGERY

## 2023-07-13 PROCEDURE — 94761 N-INVAS EAR/PLS OXIMETRY MLT: CPT

## 2023-07-13 PROCEDURE — 3700000001 HC ADD 15 MINUTES (ANESTHESIA): Performed by: ORTHOPAEDIC SURGERY

## 2023-07-13 PROCEDURE — 6360000002 HC RX W HCPCS: Performed by: ANESTHESIOLOGY

## 2023-07-13 PROCEDURE — 6370000000 HC RX 637 (ALT 250 FOR IP): Performed by: STUDENT IN AN ORGANIZED HEALTH CARE EDUCATION/TRAINING PROGRAM

## 2023-07-13 PROCEDURE — 64447 NJX AA&/STRD FEMORAL NRV IMG: CPT | Performed by: ANESTHESIOLOGY

## 2023-07-13 PROCEDURE — 2580000003 HC RX 258: Performed by: ORTHOPAEDIC SURGERY

## 2023-07-13 PROCEDURE — 27447 TOTAL KNEE ARTHROPLASTY: CPT | Performed by: ORTHOPAEDIC SURGERY

## 2023-07-13 PROCEDURE — 2500000003 HC RX 250 WO HCPCS: Performed by: ORTHOPAEDIC SURGERY

## 2023-07-13 PROCEDURE — A4217 STERILE WATER/SALINE, 500 ML: HCPCS | Performed by: ORTHOPAEDIC SURGERY

## 2023-07-13 PROCEDURE — 7100000001 HC PACU RECOVERY - ADDTL 15 MIN: Performed by: ORTHOPAEDIC SURGERY

## 2023-07-13 PROCEDURE — 2500000003 HC RX 250 WO HCPCS: Performed by: NURSE ANESTHETIST, CERTIFIED REGISTERED

## 2023-07-13 PROCEDURE — 7100000000 HC PACU RECOVERY - FIRST 15 MIN: Performed by: ORTHOPAEDIC SURGERY

## 2023-07-13 PROCEDURE — 73560 X-RAY EXAM OF KNEE 1 OR 2: CPT

## 2023-07-13 PROCEDURE — 82962 GLUCOSE BLOOD TEST: CPT

## 2023-07-13 PROCEDURE — 3700000000 HC ANESTHESIA ATTENDED CARE: Performed by: ORTHOPAEDIC SURGERY

## 2023-07-13 PROCEDURE — 2580000003 HC RX 258: Performed by: STUDENT IN AN ORGANIZED HEALTH CARE EDUCATION/TRAINING PROGRAM

## 2023-07-13 PROCEDURE — 3600000015 HC SURGERY LEVEL 5 ADDTL 15MIN: Performed by: ORTHOPAEDIC SURGERY

## 2023-07-13 PROCEDURE — 6360000002 HC RX W HCPCS: Performed by: NURSE ANESTHETIST, CERTIFIED REGISTERED

## 2023-07-13 PROCEDURE — 2700000000 HC OXYGEN THERAPY PER DAY

## 2023-07-13 PROCEDURE — 2709999900 HC NON-CHARGEABLE SUPPLY: Performed by: ORTHOPAEDIC SURGERY

## 2023-07-13 RX ORDER — KETOROLAC TROMETHAMINE 30 MG/ML
15 INJECTION, SOLUTION INTRAMUSCULAR; INTRAVENOUS EVERY 6 HOURS PRN
Status: DISCONTINUED | OUTPATIENT
Start: 2023-07-13 | End: 2023-07-14 | Stop reason: HOSPADM

## 2023-07-13 RX ORDER — ROPIVACAINE HYDROCHLORIDE 5 MG/ML
INJECTION, SOLUTION EPIDURAL; INFILTRATION; PERINEURAL
Status: COMPLETED
Start: 2023-07-13 | End: 2023-07-13

## 2023-07-13 RX ORDER — SODIUM CHLORIDE 0.9 % (FLUSH) 0.9 %
5-40 SYRINGE (ML) INJECTION EVERY 12 HOURS SCHEDULED
Status: DISCONTINUED | OUTPATIENT
Start: 2023-07-13 | End: 2023-07-13 | Stop reason: HOSPADM

## 2023-07-13 RX ORDER — OXYCODONE HYDROCHLORIDE AND ACETAMINOPHEN 5; 325 MG/1; MG/1
1 TABLET ORAL EVERY 6 HOURS PRN
Qty: 28 TABLET | Refills: 0 | Status: SHIPPED | OUTPATIENT
Start: 2023-07-13 | End: 2023-07-20

## 2023-07-13 RX ORDER — ATORVASTATIN CALCIUM 10 MG/1
20 TABLET, FILM COATED ORAL DAILY
Status: DISCONTINUED | OUTPATIENT
Start: 2023-07-13 | End: 2023-07-14 | Stop reason: HOSPADM

## 2023-07-13 RX ORDER — LIDOCAINE HYDROCHLORIDE 20 MG/ML
INJECTION, SOLUTION EPIDURAL; INFILTRATION; INTRACAUDAL; PERINEURAL PRN
Status: DISCONTINUED | OUTPATIENT
Start: 2023-07-13 | End: 2023-07-13 | Stop reason: SDUPTHER

## 2023-07-13 RX ORDER — ONDANSETRON 2 MG/ML
4 INJECTION INTRAMUSCULAR; INTRAVENOUS
Status: DISCONTINUED | OUTPATIENT
Start: 2023-07-13 | End: 2023-07-13 | Stop reason: HOSPADM

## 2023-07-13 RX ORDER — SODIUM CHLORIDE 9 MG/ML
INJECTION, SOLUTION INTRAVENOUS PRN
Status: DISCONTINUED | OUTPATIENT
Start: 2023-07-13 | End: 2023-07-14 | Stop reason: HOSPADM

## 2023-07-13 RX ORDER — PROPOFOL 10 MG/ML
INJECTION, EMULSION INTRAVENOUS CONTINUOUS PRN
Status: DISCONTINUED | OUTPATIENT
Start: 2023-07-13 | End: 2023-07-13 | Stop reason: SDUPTHER

## 2023-07-13 RX ORDER — OXYCODONE HYDROCHLORIDE 10 MG/1
10 TABLET ORAL EVERY 4 HOURS PRN
Status: DISCONTINUED | OUTPATIENT
Start: 2023-07-13 | End: 2023-07-13 | Stop reason: SDUPTHER

## 2023-07-13 RX ORDER — LIDOCAINE HYDROCHLORIDE 10 MG/ML
INJECTION, SOLUTION EPIDURAL; INFILTRATION; INTRACAUDAL; PERINEURAL
Status: DISPENSED
Start: 2023-07-13 | End: 2023-07-13

## 2023-07-13 RX ORDER — PHENYLEPHRINE HCL IN 0.9% NACL 1 MG/10 ML
SYRINGE (ML) INTRAVENOUS PRN
Status: DISCONTINUED | OUTPATIENT
Start: 2023-07-13 | End: 2023-07-13 | Stop reason: SDUPTHER

## 2023-07-13 RX ORDER — DEXTROSE MONOHYDRATE 100 MG/ML
INJECTION, SOLUTION INTRAVENOUS CONTINUOUS PRN
Status: DISCONTINUED | OUTPATIENT
Start: 2023-07-13 | End: 2023-07-14 | Stop reason: HOSPADM

## 2023-07-13 RX ORDER — INSULIN LISPRO 100 [IU]/ML
0-4 INJECTION, SOLUTION INTRAVENOUS; SUBCUTANEOUS NIGHTLY
Status: DISCONTINUED | OUTPATIENT
Start: 2023-07-13 | End: 2023-07-14

## 2023-07-13 RX ORDER — TRANEXAMIC ACID 10 MG/ML
1000 INJECTION, SOLUTION INTRAVENOUS
Status: COMPLETED | OUTPATIENT
Start: 2023-07-13 | End: 2023-07-13

## 2023-07-13 RX ORDER — SODIUM CHLORIDE 0.9 % (FLUSH) 0.9 %
5-40 SYRINGE (ML) INJECTION PRN
Status: DISCONTINUED | OUTPATIENT
Start: 2023-07-13 | End: 2023-07-14 | Stop reason: HOSPADM

## 2023-07-13 RX ORDER — FENTANYL CITRATE 50 UG/ML
25 INJECTION, SOLUTION INTRAMUSCULAR; INTRAVENOUS EVERY 5 MIN PRN
Status: DISCONTINUED | OUTPATIENT
Start: 2023-07-13 | End: 2023-07-13 | Stop reason: HOSPADM

## 2023-07-13 RX ORDER — SODIUM CHLORIDE, SODIUM LACTATE, POTASSIUM CHLORIDE, CALCIUM CHLORIDE 600; 310; 30; 20 MG/100ML; MG/100ML; MG/100ML; MG/100ML
INJECTION, SOLUTION INTRAVENOUS CONTINUOUS
Status: DISCONTINUED | OUTPATIENT
Start: 2023-07-13 | End: 2023-07-13 | Stop reason: HOSPADM

## 2023-07-13 RX ORDER — ROPIVACAINE HYDROCHLORIDE 5 MG/ML
INJECTION, SOLUTION EPIDURAL; INFILTRATION; PERINEURAL
Status: COMPLETED | OUTPATIENT
Start: 2023-07-13 | End: 2023-07-13

## 2023-07-13 RX ORDER — LANOLIN ALCOHOL/MO/W.PET/CERES
6 CREAM (GRAM) TOPICAL NIGHTLY PRN
Status: DISCONTINUED | OUTPATIENT
Start: 2023-07-13 | End: 2023-07-14 | Stop reason: HOSPADM

## 2023-07-13 RX ORDER — CEPHALEXIN 250 MG/1
250 CAPSULE ORAL 4 TIMES DAILY
Qty: 4 CAPSULE | Refills: 0 | Status: SHIPPED | OUTPATIENT
Start: 2023-07-13 | End: 2023-07-14

## 2023-07-13 RX ORDER — SODIUM CHLORIDE 0.9 % (FLUSH) 0.9 %
5-40 SYRINGE (ML) INJECTION EVERY 12 HOURS SCHEDULED
Status: DISCONTINUED | OUTPATIENT
Start: 2023-07-13 | End: 2023-07-14 | Stop reason: HOSPADM

## 2023-07-13 RX ORDER — ASPIRIN 325 MG
325 TABLET ORAL 2 TIMES DAILY
Qty: 28 TABLET | Refills: 0 | Status: SHIPPED | OUTPATIENT
Start: 2023-07-13 | End: 2023-07-27

## 2023-07-13 RX ORDER — SODIUM CHLORIDE 0.9 % (FLUSH) 0.9 %
5-40 SYRINGE (ML) INJECTION PRN
Status: DISCONTINUED | OUTPATIENT
Start: 2023-07-13 | End: 2023-07-13 | Stop reason: HOSPADM

## 2023-07-13 RX ORDER — SODIUM CHLORIDE, SODIUM LACTATE, POTASSIUM CHLORIDE, CALCIUM CHLORIDE 600; 310; 30; 20 MG/100ML; MG/100ML; MG/100ML; MG/100ML
INJECTION, SOLUTION INTRAVENOUS CONTINUOUS
Status: DISCONTINUED | OUTPATIENT
Start: 2023-07-13 | End: 2023-07-13

## 2023-07-13 RX ORDER — FENTANYL CITRATE 50 UG/ML
50 INJECTION, SOLUTION INTRAMUSCULAR; INTRAVENOUS EVERY 5 MIN PRN
Status: DISCONTINUED | OUTPATIENT
Start: 2023-07-13 | End: 2023-07-13 | Stop reason: HOSPADM

## 2023-07-13 RX ORDER — ONDANSETRON 2 MG/ML
INJECTION INTRAMUSCULAR; INTRAVENOUS PRN
Status: DISCONTINUED | OUTPATIENT
Start: 2023-07-13 | End: 2023-07-13 | Stop reason: SDUPTHER

## 2023-07-13 RX ORDER — BUPIVACAINE HYDROCHLORIDE 5 MG/ML
INJECTION, SOLUTION EPIDURAL; INTRACAUDAL
Status: COMPLETED | OUTPATIENT
Start: 2023-07-13 | End: 2023-07-13

## 2023-07-13 RX ORDER — SODIUM CHLORIDE 9 MG/ML
INJECTION, SOLUTION INTRAVENOUS PRN
Status: DISCONTINUED | OUTPATIENT
Start: 2023-07-13 | End: 2023-07-13 | Stop reason: HOSPADM

## 2023-07-13 RX ORDER — KETOROLAC TROMETHAMINE 30 MG/ML
15 INJECTION, SOLUTION INTRAMUSCULAR; INTRAVENOUS EVERY 6 HOURS PRN
Status: DISCONTINUED | OUTPATIENT
Start: 2023-07-13 | End: 2023-07-13 | Stop reason: SDUPTHER

## 2023-07-13 RX ORDER — ASPIRIN 325 MG
325 TABLET ORAL 2 TIMES DAILY
Status: DISCONTINUED | OUTPATIENT
Start: 2023-07-13 | End: 2023-07-14 | Stop reason: HOSPADM

## 2023-07-13 RX ORDER — INSULIN LISPRO 100 [IU]/ML
0-4 INJECTION, SOLUTION INTRAVENOUS; SUBCUTANEOUS
Status: DISCONTINUED | OUTPATIENT
Start: 2023-07-13 | End: 2023-07-14

## 2023-07-13 RX ORDER — LABETALOL HYDROCHLORIDE 5 MG/ML
10 INJECTION, SOLUTION INTRAVENOUS
Status: DISCONTINUED | OUTPATIENT
Start: 2023-07-13 | End: 2023-07-13 | Stop reason: HOSPADM

## 2023-07-13 RX ORDER — PANTOPRAZOLE SODIUM 40 MG/1
40 TABLET, DELAYED RELEASE ORAL DAILY
Status: DISCONTINUED | OUTPATIENT
Start: 2023-07-13 | End: 2023-07-14 | Stop reason: HOSPADM

## 2023-07-13 RX ORDER — ACETAMINOPHEN 325 MG/1
650 TABLET ORAL EVERY 6 HOURS
Status: DISCONTINUED | OUTPATIENT
Start: 2023-07-13 | End: 2023-07-13 | Stop reason: SDUPTHER

## 2023-07-13 RX ORDER — OXYCODONE HYDROCHLORIDE 5 MG/1
5 TABLET ORAL EVERY 4 HOURS PRN
Status: DISCONTINUED | OUTPATIENT
Start: 2023-07-13 | End: 2023-07-14 | Stop reason: HOSPADM

## 2023-07-13 RX ORDER — HYDRALAZINE HYDROCHLORIDE 20 MG/ML
10 INJECTION INTRAMUSCULAR; INTRAVENOUS
Status: DISCONTINUED | OUTPATIENT
Start: 2023-07-13 | End: 2023-07-13 | Stop reason: HOSPADM

## 2023-07-13 RX ORDER — ACETAMINOPHEN 500 MG
1000 TABLET ORAL ONCE
Status: COMPLETED | OUTPATIENT
Start: 2023-07-13 | End: 2023-07-13

## 2023-07-13 RX ORDER — GLUCAGON 1 MG/ML
1 KIT INJECTION PRN
Status: DISCONTINUED | OUTPATIENT
Start: 2023-07-13 | End: 2023-07-14 | Stop reason: HOSPADM

## 2023-07-13 RX ORDER — KETOROLAC TROMETHAMINE 30 MG/ML
INJECTION, SOLUTION INTRAMUSCULAR; INTRAVENOUS PRN
Status: DISCONTINUED | OUTPATIENT
Start: 2023-07-13 | End: 2023-07-13 | Stop reason: SDUPTHER

## 2023-07-13 RX ORDER — KETAMINE HCL 50MG/ML(1)
SYRINGE (ML) INTRAVENOUS PRN
Status: DISCONTINUED | OUTPATIENT
Start: 2023-07-13 | End: 2023-07-13 | Stop reason: SDUPTHER

## 2023-07-13 RX ORDER — OXYCODONE HYDROCHLORIDE 5 MG/1
5 TABLET ORAL EVERY 4 HOURS PRN
Status: DISCONTINUED | OUTPATIENT
Start: 2023-07-13 | End: 2023-07-13 | Stop reason: SDUPTHER

## 2023-07-13 RX ORDER — SODIUM CHLORIDE, SODIUM LACTATE, POTASSIUM CHLORIDE, CALCIUM CHLORIDE 600; 310; 30; 20 MG/100ML; MG/100ML; MG/100ML; MG/100ML
INJECTION, SOLUTION INTRAVENOUS CONTINUOUS
Status: DISPENSED | OUTPATIENT
Start: 2023-07-13 | End: 2023-07-14

## 2023-07-13 RX ORDER — ASPIRIN 325 MG
325 TABLET, DELAYED RELEASE (ENTERIC COATED) ORAL 2 TIMES DAILY
Status: DISCONTINUED | OUTPATIENT
Start: 2023-07-13 | End: 2023-07-13 | Stop reason: SDUPTHER

## 2023-07-13 RX ORDER — DEXAMETHASONE SODIUM PHOSPHATE 4 MG/ML
INJECTION, SOLUTION INTRA-ARTICULAR; INTRALESIONAL; INTRAMUSCULAR; INTRAVENOUS; SOFT TISSUE PRN
Status: DISCONTINUED | OUTPATIENT
Start: 2023-07-13 | End: 2023-07-13 | Stop reason: SDUPTHER

## 2023-07-13 RX ORDER — MIDAZOLAM HYDROCHLORIDE 1 MG/ML
INJECTION INTRAMUSCULAR; INTRAVENOUS PRN
Status: DISCONTINUED | OUTPATIENT
Start: 2023-07-13 | End: 2023-07-13 | Stop reason: SDUPTHER

## 2023-07-13 RX ORDER — OXYCODONE HYDROCHLORIDE 10 MG/1
10 TABLET ORAL EVERY 4 HOURS PRN
Status: DISCONTINUED | OUTPATIENT
Start: 2023-07-13 | End: 2023-07-14 | Stop reason: HOSPADM

## 2023-07-13 RX ORDER — ACETAMINOPHEN 325 MG/1
650 TABLET ORAL EVERY 6 HOURS
Status: DISCONTINUED | OUTPATIENT
Start: 2023-07-13 | End: 2023-07-14 | Stop reason: HOSPADM

## 2023-07-13 RX ADMIN — Medication 6 MG: at 23:25

## 2023-07-13 RX ADMIN — Medication 100 MCG: at 11:11

## 2023-07-13 RX ADMIN — SODIUM CHLORIDE, POTASSIUM CHLORIDE, SODIUM LACTATE AND CALCIUM CHLORIDE: 600; 310; 30; 20 INJECTION, SOLUTION INTRAVENOUS at 23:33

## 2023-07-13 RX ADMIN — Medication 100 MCG: at 11:32

## 2023-07-13 RX ADMIN — ATORVASTATIN CALCIUM 20 MG: 10 TABLET, FILM COATED ORAL at 21:04

## 2023-07-13 RX ADMIN — SODIUM CHLORIDE, POTASSIUM CHLORIDE, SODIUM LACTATE AND CALCIUM CHLORIDE: 600; 310; 30; 20 INJECTION, SOLUTION INTRAVENOUS at 08:19

## 2023-07-13 RX ADMIN — BUPIVACAINE HYDROCHLORIDE 12.5 MG: 5 INJECTION EPIDURAL; INTRACAUDAL; PERINEURAL at 10:11

## 2023-07-13 RX ADMIN — Medication 100 MCG: at 11:15

## 2023-07-13 RX ADMIN — SODIUM CHLORIDE, POTASSIUM CHLORIDE, SODIUM LACTATE AND CALCIUM CHLORIDE: 600; 310; 30; 20 INJECTION, SOLUTION INTRAVENOUS at 11:02

## 2023-07-13 RX ADMIN — MIDAZOLAM 2 MG: 1 INJECTION INTRAMUSCULAR; INTRAVENOUS at 09:55

## 2023-07-13 RX ADMIN — KETOROLAC TROMETHAMINE 30 MG: 30 INJECTION, SOLUTION INTRAMUSCULAR; INTRAVENOUS at 11:32

## 2023-07-13 RX ADMIN — ROPIVACAINE HYDROCHLORIDE 20 ML: 5 INJECTION EPIDURAL; INFILTRATION; PERINEURAL at 09:50

## 2023-07-13 RX ADMIN — ONDANSETRON 4 MG: 2 INJECTION INTRAMUSCULAR; INTRAVENOUS at 10:22

## 2023-07-13 RX ADMIN — Medication 100 MCG: at 11:46

## 2023-07-13 RX ADMIN — ACETAMINOPHEN 1000 MG: 500 TABLET ORAL at 08:01

## 2023-07-13 RX ADMIN — Medication 100 MCG: at 11:23

## 2023-07-13 RX ADMIN — OXYCODONE HYDROCHLORIDE 5 MG: 5 TABLET ORAL at 21:04

## 2023-07-13 RX ADMIN — CEFAZOLIN 3000 MG: 10 INJECTION, POWDER, FOR SOLUTION INTRAVENOUS at 10:18

## 2023-07-13 RX ADMIN — ACETAMINOPHEN 650 MG: 325 TABLET ORAL at 18:08

## 2023-07-13 RX ADMIN — LIDOCAINE HYDROCHLORIDE 2 ML: 20 INJECTION, SOLUTION EPIDURAL; INFILTRATION; INTRACAUDAL; PERINEURAL at 10:18

## 2023-07-13 RX ADMIN — ACETAMINOPHEN 650 MG: 325 TABLET ORAL at 21:04

## 2023-07-13 RX ADMIN — DEXAMETHASONE SODIUM PHOSPHATE 8 MG: 4 INJECTION, SOLUTION INTRAMUSCULAR; INTRAVENOUS at 10:22

## 2023-07-13 RX ADMIN — CEFAZOLIN 3000 MG: 10 INJECTION, POWDER, FOR SOLUTION INTRAVENOUS at 18:11

## 2023-07-13 RX ADMIN — PANTOPRAZOLE SODIUM 40 MG: 40 TABLET, DELAYED RELEASE ORAL at 21:04

## 2023-07-13 RX ADMIN — SODIUM CHLORIDE, POTASSIUM CHLORIDE, SODIUM LACTATE AND CALCIUM CHLORIDE: 600; 310; 30; 20 INJECTION, SOLUTION INTRAVENOUS at 18:07

## 2023-07-13 RX ADMIN — Medication 25 MG: at 10:30

## 2023-07-13 RX ADMIN — SODIUM CHLORIDE, POTASSIUM CHLORIDE, SODIUM LACTATE AND CALCIUM CHLORIDE: 600; 310; 30; 20 INJECTION, SOLUTION INTRAVENOUS at 20:53

## 2023-07-13 RX ADMIN — TRANEXAMIC ACID 1000 MG: 10 INJECTION, SOLUTION INTRAVENOUS at 10:25

## 2023-07-13 RX ADMIN — LIDOCAINE HYDROCHLORIDE 3 ML: 20 INJECTION, SOLUTION EPIDURAL; INFILTRATION; INTRACAUDAL; PERINEURAL at 11:24

## 2023-07-13 RX ADMIN — PROPOFOL 50 MCG/KG/MIN: 10 INJECTION, EMULSION INTRAVENOUS at 10:18

## 2023-07-13 RX ADMIN — ASPIRIN 325 MG: 325 TABLET ORAL at 21:04

## 2023-07-13 RX ADMIN — Medication 25 MG: at 10:18

## 2023-07-13 ASSESSMENT — PAIN SCALES - GENERAL
PAINLEVEL_OUTOF10: 4
PAINLEVEL_OUTOF10: 0
PAINLEVEL_OUTOF10: 2
PAINLEVEL_OUTOF10: 2

## 2023-07-13 ASSESSMENT — PAIN DESCRIPTION - LOCATION
LOCATION: LEG;KNEE
LOCATION: KNEE

## 2023-07-13 ASSESSMENT — PAIN DESCRIPTION - ORIENTATION
ORIENTATION: RIGHT
ORIENTATION: RIGHT

## 2023-07-13 ASSESSMENT — PAIN - FUNCTIONAL ASSESSMENT
PAIN_FUNCTIONAL_ASSESSMENT: ACTIVITIES ARE NOT PREVENTED
PAIN_FUNCTIONAL_ASSESSMENT: 0-10

## 2023-07-13 ASSESSMENT — LIFESTYLE VARIABLES
HOW MANY STANDARD DRINKS CONTAINING ALCOHOL DO YOU HAVE ON A TYPICAL DAY: PATIENT DOES NOT DRINK
HOW OFTEN DO YOU HAVE A DRINK CONTAINING ALCOHOL: NEVER

## 2023-07-13 ASSESSMENT — PAIN DESCRIPTION - DESCRIPTORS
DESCRIPTORS: ACHING
DESCRIPTORS: ACHING;DISCOMFORT

## 2023-07-13 NOTE — OP NOTE
plate was then removed. The  bone ends were then irrigated with pulse lavage using sterile normal  saline with gentamicin, the bone ends were then dried. The final size E press-fit tibial component was then malleted in place until it was firmly seated. The final size 10 Narrow press-fit distal femoral component was then  malleted in place until it was firmly seated. I then everted the patella,  which was irrigated with pulse lavage and then dried. I them inserted the final size 32 patellar component which was then clamped in place until it was firmly seated. The tourniquet was then deflated for a total of 33 minutes and adequate hemostasis was maintained. The joint was then irrigated further with pulse lavage. I  then inserted the final size 13 MC tibial poly component, which was locked  in place. With all final components in place, I ran the knee through a  full range of motion and found there to be excellent tracking of the  patella with no laxity to varus or valgus stressing. The joint was then irrigated further with pulse lavage. I then re-approximated the medial  parapatellar incision using #5 Ethibond suture and #1 Vicryl suture in  figure-of-eight fashion. I then closed subcutaneous tissue using 2-0  Vicryl suture followed by skin closure with stratafix and liquiband  Adequate  hemostasis was maintained at all times. I then applied a sterile soft  dressing to the right lower extremity. The patient was then awakened  from anesthesia and transported to PACU in stable condition. She  appeared to have tolerated the procedure well. PROGNOSIS:  At this point, the patient will begin physical therapy in the same day surgery area. Once the patient has passed with physical therapy they will be discharged home. They will continue in home physical therapy for gait training and can be  weightbearing as tolerated on the operative leg.   The patient will receive prescriptions for pain medication

## 2023-07-13 NOTE — BRIEF OP NOTE
Brief Postoperative Note      Patient: Jayden Raya  YOB: 1970  MRN: [id number]    Date of Procedure: 7/13/2023    Pre-Op Diagnosis Codes:     * Primary osteoarthritis of right knee [M17.11]     * Chronic pain of right knee [M25.561, G89.29]    Post-Op Diagnosis: Same       Procedure(s):  RIGHT KNEE TOTAL ARTHROPLASTY    Surgeon(s):  Blessing Humphreys DO    Assistant:  * No surgical staff found *    Anesthesia: Spinal    Estimated Blood Loss (mL): 891     Complications: None    Specimens:   * No specimens in log *    Implants:  Implant Name Type Inv.  Item Serial No.  Lot No. LRB No. Used Action   PSN TIB POR 2 PEG SZ E R - [id number]  PSN TIB POR 2 PEG SZ E R  [employer] [facility] [facility] [id number] Right 1 Implanted   PSN FEM CR POR CCR STD SZ10 R - [id number]  PSN FEM CR POR CCR STD SZ10 R  [employer] [facility] [id number] Right 1 Implanted   COMPONENT PAT [id number] STD TI POLY TRABECULAR MTL - [id number]  COMPONENT PAT [id number] STD TI POLY TRABECULAR MTL  [employer] [facility] [id number] Right 1 Implanted   PSN MC VE ASF R 13MM 8-11 EF - [id number]  PSN MC VE ASF R 13MM 8-11 EF  [employer] [facility] [id number] Right 1 Implanted         Drains: * No LDAs found *    Findings: R knee OA      Electronically signed by Blessing Humphreys DO on 7/13/2023 at 11:58 AM

## 2023-07-13 NOTE — ANESTHESIA PROCEDURE NOTES
Peripheral Block    Patient location during procedure: procedure area  Reason for block: procedure for pain, post-op pain management and at surgeon's request  Start time: 7/13/2023 9:50 AM  End time: 7/13/2023 9:55 AM  Staffing  Performed: resident/CRNA   Anesthesiologist: Boone Worley MD  Resident/CRNA: ADRIANE Minaya CRNA  Preanesthetic Checklist  Completed: patient identified, IV checked, site marked, risks and benefits discussed, surgical/procedural consents, equipment checked, pre-op evaluation, timeout performed, anesthesia consent given, oxygen available, monitors applied/VS acknowledged, fire risk safety assessment completed and verbalized and blood product R/B/A discussed and consented  Peripheral Block   Patient position: supine  Prep: ChloraPrep  Provider prep: mask and sterile gloves  Patient monitoring: cardiac monitor, continuous pulse ox, continuous capnometry, frequent blood pressure checks, IV access, oxygen and responsive to questions  Block type: Femoral  Adductor canal  Laterality: left  Injection technique: single-shot  Guidance: ultrasound guided  Local infiltration: lidocaine  Infiltration strength: 1 %  Local infiltration: lidocaine  Dose: 2 mL    Needle   Needle type: insulated echogenic nerve stimulator needle   Needle gauge: 20 G  Needle localization: ultrasound guidance  Needle length: 10 cm  Assessment   Injection assessment: negative aspiration for heme, no paresthesia on injection, local visualized surrounding nerve on ultrasound and no intravascular symptoms  Paresthesia pain: none  Slow fractionated injection: yes  Hemodynamics: stable  Real-time US image taken/store: yes  Outcomes: uncomplicated and patient tolerated procedure well    Medications Administered  ropivacaine (NAROPIN) injection 0.5% - Perineural   20 mL - 7/13/2023 9:50:00 AM

## 2023-07-13 NOTE — ANESTHESIA PROCEDURE NOTES
Spinal Block    Patient location during procedure: OR  End time: 7/13/2023 10:18 AM  Reason for block: primary anesthetic  Staffing  Performed: resident/CRNA   Anesthesiologist: eGge Jean MD  Resident/CRNA: ADRIANE De Souza CRNA  Spinal Block  Patient position: sitting  Prep: Betadine  Patient monitoring: cardiac monitor, continuous pulse ox, continuous capnometry, frequent blood pressure checks and oxygen  Approach: midline  Location: L4/L5  Guidance: paresthesia technique  Provider prep: sterile gloves and mask  Local infiltration: lidocaine  Needle  Needle type: Quincke   Needle gauge: 22 G  Needle length: 5 in  Assessment  Sensory level: T8  Swirl obtained: Yes  CSF: clear  Attempts: 1  Hemodynamics: stable  Preanesthetic Checklist  Completed: patient identified, IV checked, site marked, risks and benefits discussed, surgical/procedural consents, equipment checked, pre-op evaluation, timeout performed, anesthesia consent given, oxygen available, monitors applied/VS acknowledged, fire risk safety assessment completed and verbalized and blood product R/B/A discussed and consented

## 2023-07-13 NOTE — H&P
Subjective:      Patient ID: Diamond Carpio is a 46 y.o. female. Patient seen in office today for:12wk PO Lt TKA, Pre-Op Right knee for TKA     DOI:  n/a   DOS:  1/5/23  Date of last injection:  n/a     Patient denies decreased ROM. Patient reports 1/10 pain. RICE and medication are not needed to alleviate pain and reduce swelling. Patient completed physical therapy, for left TKA. Patient interested in speaking to provider about surgical option for Right Knee. Xrays performed in office today. Profession: Teacher     She comes in today for her 3-month postop recheck. Overall she states that in regards to her left knee she is feeling great and is not having any pain or issues with her left side. Patient denies any new injury to the involved extremity/ joint, denies numbness or tingling in the involved extremity and denies fever or chills. At this point her main complaint is worsening deep, aching and throbbing pain globally in her right knee. Patient denies any new injury to the involved extremity/ joint, denies numbness or tingling in the involved extremity and denies fever or chills. She has been attempting to work on weight loss but is having difficulty with exercise due to the pain in her right knee. She would like to discuss proceeding with right knee replacement at this point. Review of Systems   Constitutional:  Negative for activity change, chills and fever. HENT:  Negative for congestion and drooling. Eyes:  Negative for redness. Respiratory:  Negative for chest tightness. Cardiovascular:  Negative for chest pain. Gastrointestinal:  Negative for abdominal pain. Endocrine: Negative for cold intolerance and heat intolerance. Musculoskeletal:  Positive for arthralgias, gait problem, joint swelling and myalgias. Negative for back pain. Skin:  Negative for color change, pallor, rash and wound. Neurological:  Negative for weakness and numbness.

## 2023-07-14 VITALS
DIASTOLIC BLOOD PRESSURE: 67 MMHG | TEMPERATURE: 97.7 F | HEIGHT: 64 IN | WEIGHT: 293 LBS | BODY MASS INDEX: 50.02 KG/M2 | HEART RATE: 80 BPM | OXYGEN SATURATION: 97 % | SYSTOLIC BLOOD PRESSURE: 131 MMHG | RESPIRATION RATE: 16 BRPM

## 2023-07-14 LAB
ESTIMATED AVERAGE GLUCOSE: 143 MG/DL
GLUCOSE BLD-MCNC: 279 MG/DL (ref 70–99)
GLUCOSE BLD-MCNC: 358 MG/DL (ref 70–99)
HBA1C MFR BLD: 6.6 % (ref 4.2–6.3)
HCT VFR BLD CALC: 30.9 % (ref 37–47)
HEMOGLOBIN: 9.8 GM/DL (ref 12.5–16)

## 2023-07-14 PROCEDURE — 6370000000 HC RX 637 (ALT 250 FOR IP): Performed by: STUDENT IN AN ORGANIZED HEALTH CARE EDUCATION/TRAINING PROGRAM

## 2023-07-14 PROCEDURE — 6370000000 HC RX 637 (ALT 250 FOR IP): Performed by: ORTHOPAEDIC SURGERY

## 2023-07-14 PROCEDURE — 97535 SELF CARE MNGMENT TRAINING: CPT

## 2023-07-14 PROCEDURE — 82962 GLUCOSE BLOOD TEST: CPT

## 2023-07-14 PROCEDURE — 6370000000 HC RX 637 (ALT 250 FOR IP)

## 2023-07-14 PROCEDURE — 94761 N-INVAS EAR/PLS OXIMETRY MLT: CPT

## 2023-07-14 PROCEDURE — 97116 GAIT TRAINING THERAPY: CPT

## 2023-07-14 PROCEDURE — 83036 HEMOGLOBIN GLYCOSYLATED A1C: CPT

## 2023-07-14 PROCEDURE — 97165 OT EVAL LOW COMPLEX 30 MIN: CPT

## 2023-07-14 PROCEDURE — 85018 HEMOGLOBIN: CPT

## 2023-07-14 PROCEDURE — 6360000002 HC RX W HCPCS: Performed by: ORTHOPAEDIC SURGERY

## 2023-07-14 PROCEDURE — 97110 THERAPEUTIC EXERCISES: CPT

## 2023-07-14 PROCEDURE — 36415 COLL VENOUS BLD VENIPUNCTURE: CPT

## 2023-07-14 PROCEDURE — 97162 PT EVAL MOD COMPLEX 30 MIN: CPT

## 2023-07-14 PROCEDURE — 85014 HEMATOCRIT: CPT

## 2023-07-14 PROCEDURE — 2580000003 HC RX 258: Performed by: ORTHOPAEDIC SURGERY

## 2023-07-14 RX ORDER — INSULIN LISPRO 100 [IU]/ML
0-8 INJECTION, SOLUTION INTRAVENOUS; SUBCUTANEOUS
Status: DISCONTINUED | OUTPATIENT
Start: 2023-07-14 | End: 2023-07-14 | Stop reason: HOSPADM

## 2023-07-14 RX ORDER — INSULIN LISPRO 100 [IU]/ML
0-4 INJECTION, SOLUTION INTRAVENOUS; SUBCUTANEOUS NIGHTLY
Status: DISCONTINUED | OUTPATIENT
Start: 2023-07-14 | End: 2023-07-14 | Stop reason: HOSPADM

## 2023-07-14 RX ADMIN — INSULIN LISPRO 8 UNITS: 100 INJECTION, SOLUTION INTRAVENOUS; SUBCUTANEOUS at 11:52

## 2023-07-14 RX ADMIN — INSULIN LISPRO 4 UNITS: 100 INJECTION, SOLUTION INTRAVENOUS; SUBCUTANEOUS at 08:43

## 2023-07-14 RX ADMIN — Medication 10 ML: at 08:48

## 2023-07-14 RX ADMIN — ACETAMINOPHEN 650 MG: 325 TABLET ORAL at 10:58

## 2023-07-14 RX ADMIN — ACETAMINOPHEN 650 MG: 325 TABLET ORAL at 05:15

## 2023-07-14 RX ADMIN — PANTOPRAZOLE SODIUM 40 MG: 40 TABLET, DELAYED RELEASE ORAL at 08:44

## 2023-07-14 RX ADMIN — KETOROLAC TROMETHAMINE 15 MG: 30 INJECTION, SOLUTION INTRAMUSCULAR; INTRAVENOUS at 01:02

## 2023-07-14 RX ADMIN — ATORVASTATIN CALCIUM 20 MG: 10 TABLET, FILM COATED ORAL at 08:44

## 2023-07-14 RX ADMIN — CEFAZOLIN 3000 MG: 10 INJECTION, POWDER, FOR SOLUTION INTRAVENOUS at 02:12

## 2023-07-14 RX ADMIN — ASPIRIN 325 MG: 325 TABLET ORAL at 08:44

## 2023-07-14 ASSESSMENT — PAIN DESCRIPTION - DESCRIPTORS
DESCRIPTORS: ACHING
DESCRIPTORS: ACHING;DISCOMFORT

## 2023-07-14 ASSESSMENT — PAIN SCALES - GENERAL
PAINLEVEL_OUTOF10: 10
PAINLEVEL_OUTOF10: 2
PAINLEVEL_OUTOF10: 6
PAINLEVEL_OUTOF10: 4

## 2023-07-14 ASSESSMENT — PAIN DESCRIPTION - LOCATION
LOCATION: KNEE
LOCATION: KNEE
LOCATION: KNEE;LEG

## 2023-07-14 ASSESSMENT — PAIN DESCRIPTION - ORIENTATION
ORIENTATION: RIGHT
ORIENTATION: RIGHT

## 2023-07-14 ASSESSMENT — PAIN - FUNCTIONAL ASSESSMENT: PAIN_FUNCTIONAL_ASSESSMENT: ACTIVITIES ARE NOT PREVENTED

## 2023-07-14 NOTE — PLAN OF CARE
Problem: Chronic Conditions and Co-morbidities  Goal: Patient's chronic conditions and co-morbidity symptoms are monitored and maintained or improved  7/14/2023 1232 by Brenda Holliday RN  Outcome: Adequate for Discharge  7/13/2023 2353 by Victoria Cortes LPN  Outcome: Progressing     Problem: Discharge Planning  Goal: Discharge to home or other facility with appropriate resources  7/14/2023 1232 by Brenda Holliday RN  Outcome: Adequate for Discharge  7/13/2023 2353 by Victoria Cortes LPN  Outcome: Progressing     Problem: Pain  Goal: Verbalizes/displays adequate comfort level or baseline comfort level  7/14/2023 1232 by Brenda Holliday RN  Outcome: Adequate for Discharge  7/13/2023 2353 by Victoria Cortes LPN  Outcome: Progressing     Problem: Safety - Adult  Goal: Free from fall injury  7/14/2023 1232 by Brenda Holliday RN  Outcome: Adequate for Discharge  7/13/2023 2353 by Victoria Cortes LPN  Outcome: Progressing     Problem: ABCDS Injury Assessment  Goal: Absence of physical injury  7/14/2023 1232 by Brenda Holliday RN  Outcome: Adequate for Discharge  7/13/2023 2353 by Victoria Cortes LPN  Outcome: Progressing

## 2023-07-14 NOTE — ANESTHESIA POSTPROCEDURE EVALUATION
Department of Anesthesiology  Postprocedure Note    Patient: Kassandra Howard  MRN: [id number]  YOB: 1970  Date of evaluation: 7/13/2023      Procedure Summary     Date: 07/13/23 Room / Location: [address] [place] [address]    Anesthesia Start: 1011 Anesthesia Stop: 1210    Procedure: RIGHT KNEE TOTAL ARTHROPLASTY (Right: Knee) Diagnosis:       Primary osteoarthritis of right knee      Chronic pain of right knee      (Primary osteoarthritis of right knee [M17.11])      (Chronic pain of right knee [M25.561, G89.29])    Surgeons: Jodi Casanova DO Responsible Provider: Jordan Kevin MD    Anesthesia Type: general, regional, MAC ASA Status: 3          Anesthesia Type: No value filed.     Peter Phase I: Peter Score: 9    Peter Phase II:        Anesthesia Post Evaluation    Patient location during evaluation: PACU  Patient participation: complete - patient participated  Level of consciousness: sleepy but conscious  Pain score: 2  Airway patency: patent  Nausea & Vomiting: no nausea and no vomiting  Complications: no  Cardiovascular status: hemodynamically stable  Respiratory status: acceptable  Hydration status: euvolemic

## 2023-07-14 NOTE — DISCHARGE SUMMARY
ADMIT DATE: 7/13/2023    DISCHARGE DATE: 7/14/2023    PROVIDER:     Sabine Espitia DO                      ADMISSION DIAGNOSIS:  Right knee DJD. DISCHARGE DIAGNOSIS:  Right knee DJD. PROCEDURE:  Right total knee arthroplasty on  7/13/2023. HOSPITAL COURSE:  The patient is a 46year-old female with a  longstanding history of right knee pain. For this, she was brought to  [facility] on 7/13/2023 where she underwent right   total knee arthroplasty. She was admitted postoperatively  for pain control, rehabilitation, and medical monitoring. Hospitalist  was consulted for medical management. Physical Therapy was consulted  for gait training. She did receive antibiotic prophylaxis and DVT  prophylaxis during her hospitalization. Throughout her hospital  course, clinically, she remained stable with no apparent medical  complications. She was progressing well with physical therapy and her  pain was well controlled with oral medications.  was  consulted for discharge planning. Given that clinically she was  stable, she was discharged to home on 7/14/2023. DISCHARGE MEDICATIONS:    1. Oxycodone 5 mg one p.o. q.6 h. p.r.n. Pain. 2.   mg p.o. b.i.d. for 14 days  3. Other medications per the [facility] DISCHARGE INSTRUCTIONS:    She is to have physical therapy for gait training and range of motion, and can be weightbearing as tolerated on the right leg. Her incision is to be kept clean, dry, and intact at all times. She is to ice and elevate the right lower extremity for pain and swelling. She is to contact my office if she develops increased pain, swelling, numbness, tingling, redness, fevers, or chills. She is to follow up in my office in 3 weeks at her prescheduled appointment.            [address] [place] [address], DO

## 2023-07-14 NOTE — CONSULTS
[id number] [facility] , 1970, 1104/1104-A, 7/14/2023    History  [place]:  The primary encounter diagnosis was Pre-op testing. A diagnosis of Status post right knee replacement was also pertinent to this visit. Patient  has a past medical history of Arthritis, Cancer ([address]), Diabetes ([address]), Endometrial cancer ([address]), GERD (gastroesophageal reflux disease), H/O cardiovascular stress test, H/O echocardiogram, Hyperlipidemia, and Ovarian cancer ([address]). Patient  has a past surgical history that includes Hysterectomy; Tonsillectomy; Colonoscopy (N/A, 1/9/2020); Upper gastrointestinal endoscopy (N/A, 1/9/2020); Ovary removal; Total knee arthroplasty (Left, 1/5/2023); Carpal tunnel release (Left, 4/13/2023); and Carpal tunnel release (Right, 5/30/2023). Subjective:  Patient states:  \"I had my other knee done in [place]". Pain:  pt reports 3/10 R knee pain in gait\".     Communication with other providers:  Handoff to RN, co-eval with Cynthia ANTONIO  Restrictions: Fall risk, WBAT R TKA, general    Home Setup/Prior level of function   Social/Functional History  Lives With: Alone (mom lives next door and able to help)  Type of Home: Apartment  Home Layout: One level  Home Access: Level entry  Bathroom Shower/Tub: Tub/Shower unit  Bathroom Toilet: Standard  Bathroom Equipment: Grab bars in shower, hand-held shower  Home Equipment: Cane (independent without AD inside, mod I with SPC in community)  ADL Assistance: Independent  Homemaking Assistance: Independent  Homemaking Responsibilities: Yes  Ambulation Assistance: Independent (pt reports intermittent use of SPC and RW)  Transfer Assistance: Independent  Active : Yes  Occupation: Full time employment  Type of Occupation: Special  at [employer]    Examination of body systems (includes body structures/functions, activity/participation limitations):  Observation:  pt is resting in semi-fowlers upon
aspirin  325 mg Oral BID        Infusions:    lactated ringers IV soln      sodium chloride      lactated ringers IV soln 125 mL/hr at 07/13/23 1807    sodium chloride         PRN Meds:   sodium chloride flush, 5-40 mL, PRN  sodium chloride, , PRN  sodium chloride flush, 5-40 mL, PRN  sodium chloride, , PRN  ketorolac, 15 mg, Q6H PRN  oxyCODONE, 5 mg, Q4H PRN   Or  oxyCODONE, 10 mg, Q4H PRN        Data:     CBC: No results for input(s): WBC, HGB, PLT, MCV, RDW, BANDSPCT, BLASTSPCT, METASPCT, LYMPHOPCT, PROMYELOPCT, MONOPCT, MYELOPCT, EOSPCT, BASOPCT, MONOSABS, LYMPHSABS, EOSABS, BASOSABS in the last 72 hours. Invalid input(s): SEGSPCTL, ATYLMREL  CMP:  No results for input(s): NA, K, CL, CO2, BUN, CREATININE, GFRAA, GLUCOSE, LABALBU, CALCIUM, BILITOT, ALKPHOS, AST, ALT in the last 72 hours. Lipids:   Lab Results   Component Value Date/Time    CHOL 144 01/16/2023 08:30 AM    HDL 49 01/16/2023 08:30 AM    TRIG 121 01/16/2023 08:30 AM     Hemoglobin A1C:   Lab Results   Component Value Date/Time    LABA1C 7.0 01/16/2023 03:45 PM     TSH: No results found for: TSH  Troponin: No results found for: TROPONINT  BNP: No results for input(s): PROBNP in the last 72 hours. Lactic Acid: No results for input(s): LACTA in the last 72 hours.   UA:  Lab Results   Component Value Date/Time    NITRU NEGATIVE 06/29/2023 07:52 AM    COLORU YELLOW 06/29/2023 07:52 AM    WBCUA 28 12/01/2017 04:20 PM    RBCUA 8 12/01/2017 04:20 PM    MUCUS MODERATE 12/01/2017 04:20 PM    TRICHOMONAS NONE SEEN 12/01/2017 04:20 PM    BACTERIA MANY 12/01/2017 04:20 PM    CLARITYU CLEAR 06/29/2023 07:52 AM    SPECGRAV >1.030 06/29/2023 07:52 AM    LEUKOCYTESUR NEGATIVE 06/29/2023 07:52 AM    UROBILINOGEN 0.2 06/29/2023 07:52 AM    BILIRUBINUR NEGATIVE 06/29/2023 07:52 AM    BLOODU NEGATIVE 06/29/2023 07:52 AM    KETUA TRACE 06/29/2023 07:52 AM     Urine Cultures: No results found for: LABURIN  Blood Cultures: No results found for: BC  No results found for:

## 2023-07-14 NOTE — CARE COORDINATION
[facility] Liaison spoke w/pt and is aware of discharge & will initiate [address]. Verified address and number. Dr Oneyda Younger following for [facility].

## 2023-07-24 ENCOUNTER — TELEPHONE (OUTPATIENT)
Dept: ORTHOPEDIC SURGERY | Age: 53
End: 2023-07-24

## 2023-07-25 DIAGNOSIS — Z96.651 STATUS POST RIGHT KNEE REPLACEMENT: Primary | ICD-10-CM

## 2023-07-25 RX ORDER — OXYCODONE HYDROCHLORIDE AND ACETAMINOPHEN 5; 325 MG/1; MG/1
1 TABLET ORAL EVERY 6 HOURS PRN
Qty: 28 TABLET | Refills: 0 | Status: SHIPPED | OUTPATIENT
Start: 2023-07-25 | End: 2023-08-01

## 2023-07-26 ENCOUNTER — TELEPHONE (OUTPATIENT)
Dept: ORTHOPEDIC SURGERY | Age: 53
End: 2023-07-26

## 2023-07-26 DIAGNOSIS — Z96.652 S/P TOTAL KNEE ARTHROPLASTY, LEFT: Primary | ICD-10-CM

## 2023-07-26 DIAGNOSIS — Z96.651 S/P TOTAL KNEE ARTHROPLASTY, RIGHT: ICD-10-CM

## 2023-07-26 NOTE — TELEPHONE ENCOUNTER
Ortho Navigator post op call made today. DOS: 7/13/2023    Patient is not in need of pain medication refills at this time. Patient had eval for PT with home health, and one PT encounter and therapist suggested she didn't know if she would be able to come back. Suggested to patient out patient might be a more stable and effective course of therapy at this time. Ordered entered at this time. Patient is aware of post op appts in this office.

## 2023-07-28 ENCOUNTER — HOSPITAL ENCOUNTER (OUTPATIENT)
Dept: PHYSICAL THERAPY | Age: 53
Setting detail: THERAPIES SERIES
Discharge: HOME | End: 2023-07-28
Payer: COMMERCIAL

## 2023-07-28 PROCEDURE — 97110 THERAPEUTIC EXERCISES: CPT

## 2023-07-28 PROCEDURE — 97161 PT EVAL LOW COMPLEX 20 MIN: CPT

## 2023-07-28 ASSESSMENT — PAIN DESCRIPTION - ORIENTATION: ORIENTATION: RIGHT

## 2023-07-28 ASSESSMENT — PAIN SCALES - GENERAL: PAINLEVEL_OUTOF10: 1

## 2023-07-28 ASSESSMENT — PAIN DESCRIPTION - PAIN TYPE: TYPE: SURGICAL PAIN

## 2023-07-28 ASSESSMENT — PAIN DESCRIPTION - LOCATION: LOCATION: KNEE

## 2023-07-28 NOTE — FLOWSHEET NOTE
therapy post TKR 7/13/23, she appear to be with limited quad control, gait , knee ROM, trf and gait. She as has elevated pain and swelling in the knee. 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 Pt would benefit from skilled therapy interventions as needed to address listed impairments, progress toward goal completion and improve ADL/IADL status. PT also warranted to reduce risk for further injury or decline. Patient agrees with established plan of care and assisted in the development of their short term and long term goals. Patient had no adverse reaction with initial treatment and there are no barriers to learning. Demonstrates no mental or cognitive disorder.      Plan for Next Session:   Specific Instructions for Next Treatment: progress as tolerated, strength , ROM, mobes , modalities pre  Progress as tolerated TKR R 7/13/23, ROM , strength ,manual and modalities    Consider PROM next session possible VASO also    Time In / Time Out:     1310 1355           Timed Code/Total Treatment Minutes:  1 eval ( 30) 1 TE ( 15)      Next Progress Note due:   8/28/23      Plan of Care Interventions:  [x] Therapeutic Exercise  [x] Modalities: PRN  [x] Therapeutic Activity     [] Ultrasound  [x] Estim  [x] Gait Training      [] Cervical Traction [] Lumbar Traction  [x] Neuromuscular Re-education    [x] Cold/hotpack [] Iontophoresis   [x] Instruction in HEP      [x] Vasopneumatic   [] Dry Needling    [x] Manual Therapy               [] Aquatic Therapy              Electronically signed by:  Genia Diez, PT, 7/28/2023, 2:25 PM

## 2023-07-28 NOTE — PROGRESS NOTES
in the POC to increase the likelihood of meeting the functionally related goals stated below. 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 ind with HEP for quad strength, Rom, and trf and gait ability     2. PROM R knee improved to Flex 100 ext 0     3. Demo good quad set and SLR with no lag for better trf to and from bed , chairs, and walking ability     4. avg pain  L knee  3/10 or less                                             Long Term Goals Completed by 8-10 weeks Goal Status   1. walking community distance with no AD     2. stairs reciprocally with one rail as needed 1 flight     3. L knee PROM flex 115 ext 0     4.  MMT L knee flex and ext 4+/5     5. reports 75% better overall inthe L knee with ADL, household activity, and teaching                                        TREATMENT PLAN   PT Equipment Recommendations  Equipment Needed: No   Requires PT Follow-Up: Yes  Treatment Initiated : started on HEP, cintinue ice and elevation  Specific Instructions for Next Treatment: progress as tolerated, strength , ROM, mobes , modalities pre    Pt. actively involved in establishing Plan of Care and Goals: Yes  Patient/ Caregiver education and instruction: Goals, PT Role, Plan of Care, Evaluative findings, Insurance, Home Exercise Program             Treatment may include any combination of the following: Current Treatment Recommendations: Strengthening, ROM, Balance training, Functional mobility training, Transfer training, Pain management, Modalities, Therapeutic activities, Home exercise program, Stair training, Gait training, Manual  Modalities: Vasopneumatic Device (prn)

## 2023-08-02 ENCOUNTER — OFFICE VISIT (OUTPATIENT)
Dept: ORTHOPEDIC SURGERY | Age: 53
End: 2023-08-02

## 2023-08-02 VITALS
WEIGHT: 293 LBS | BODY MASS INDEX: 50.02 KG/M2 | HEART RATE: 74 BPM | OXYGEN SATURATION: 97 % | TEMPERATURE: 96.9 F | RESPIRATION RATE: 16 BRPM | HEIGHT: 64 IN

## 2023-08-02 DIAGNOSIS — Z96.651 S/P TOTAL KNEE ARTHROPLASTY, RIGHT: Primary | ICD-10-CM

## 2023-08-02 PROCEDURE — 99024 POSTOP FOLLOW-UP VISIT: CPT | Performed by: PHYSICIAN ASSISTANT

## 2023-08-02 NOTE — PATIENT INSTRUCTIONS
Continue doing physical therapy  Continue using walker or cane  Continue weight bear as tolerated  Continue range of motion and exercises as instruction  Ice and elevate as needed  Tylenol or Motrin for pain  Follow up in 3 weeks with Dr. Dandre Karimi    If you have any question post operatively. Please contact office to speak with [facility]. We are committed to providing you the best care possible. If you receive a survey after visiting one of our offices, please take time to share your experience concerning your physician office visit. These surveys are confidential and no health information about you is shared. We are eager to improve for you and we are counting on your feedback to help make that happen.

## 2023-08-08 ENCOUNTER — TELEPHONE (OUTPATIENT)
Dept: ORTHOPEDIC SURGERY | Age: 53
End: 2023-08-08

## 2023-08-09 DIAGNOSIS — Z96.651 S/P TOTAL KNEE ARTHROPLASTY, RIGHT: Primary | ICD-10-CM

## 2023-08-09 RX ORDER — OXYCODONE HYDROCHLORIDE AND ACETAMINOPHEN 5; 325 MG/1; MG/1
1 TABLET ORAL EVERY 6 HOURS PRN
Qty: 28 TABLET | Refills: 0 | Status: SHIPPED | OUTPATIENT
Start: 2023-08-09 | End: 2023-08-16

## 2023-08-21 ENCOUNTER — OFFICE VISIT (OUTPATIENT)
Dept: ORTHOPEDIC SURGERY | Age: 53
End: 2023-08-21

## 2023-08-21 ENCOUNTER — HOSPITAL ENCOUNTER (OUTPATIENT)
Dept: PHYSICAL THERAPY | Age: 53
Discharge: HOME | End: 2023-08-21

## 2023-08-21 VITALS
HEART RATE: 100 BPM | OXYGEN SATURATION: 98 % | RESPIRATION RATE: 16 BRPM | HEIGHT: 64 IN | BODY MASS INDEX: 50.02 KG/M2 | WEIGHT: 293 LBS | TEMPERATURE: 97.4 F

## 2023-08-21 DIAGNOSIS — Z96.651 S/P TOTAL KNEE ARTHROPLASTY, RIGHT: Primary | ICD-10-CM

## 2023-08-21 PROCEDURE — 99024 POSTOP FOLLOW-UP VISIT: CPT | Performed by: ORTHOPAEDIC SURGERY

## 2023-08-21 ASSESSMENT — ENCOUNTER SYMPTOMS
ABDOMINAL PAIN: 0
COLOR CHANGE: 0
EYE REDNESS: 0
CHEST TIGHTNESS: 0
BACK PAIN: 0

## 2023-08-21 NOTE — FLOWSHEET NOTE
Physical Therapy  Cancellation/No-show Note  Patient Name:  Qamar Mcdonnell  :  1970   Date:  2023  Cancelled visits to date: 1  No-shows to date: 0    For today's appointment patient:  [x]  Cancelled  []  Rescheduled appointment  []  No-show     Reason given by patient:  []  Patient ill  []  Conflicting appointment  []  No transportation    []  Conflict with work  [x]  No reason given  []  Other:     Comments:      Electronically signed by:  Cassy Carrasquillo PT,

## 2023-08-21 NOTE — PROGRESS NOTES
Date of surgery:   July 11th  Surgeon: Dr. Vinnie Linares    History:  Ms. Alyssia Hernandez is here in follow up regarding her right total knee arthroplasty. She is doing rather well. She is having 3/10 pain. She denies chest pain, SOB, calf pain,fever,wound drainage, tenderness in the calf, and ankle swelling. No other issues.

## 2023-08-21 NOTE — PROGRESS NOTES
Subjective:      Patient ID: Miguelangel Noble is a 46 y.o. female. Date of surgery:   July 11th  Surgeon: Dr. Candelario Henning     History:  Ms. Miguelangel Noble is here in follow up regarding her right total knee arthroplasty. She is doing rather well. She is having 3/10 pain. She denies chest pain, SOB, calf pain,fever,wound drainage, tenderness in the calf, and ankle swelling. No other issues. She comes in today for her 6-week postop recheck after right TKA. Overall she states that she feels like she is progressing well with motion but she continues to have dull and aching pain in the right knee as well as hypersensitivity along the lateral aspect of the right knee. She has just begun physical therapy a few weeks ago. Patient denies any new injury to the involved extremity/ joint, denies numbness or tingling in the involved extremity and denies fever or chills. Review of Systems   Constitutional:  Negative for activity change, chills and fever. HENT:  Negative for congestion and drooling. Eyes:  Negative for redness. Respiratory:  Negative for chest tightness. Cardiovascular:  Negative for chest pain. Gastrointestinal:  Negative for abdominal pain. Endocrine: Negative for cold intolerance and heat intolerance. Musculoskeletal:  Positive for arthralgias, gait problem, joint swelling and myalgias. Negative for back pain. Skin:  Negative for color change, pallor, rash and wound. Neurological:  Negative for weakness and numbness. Psychiatric/Behavioral:  Negative for confusion.       Past Medical History:   Diagnosis Date    Arthritis     Cancer ([address] [facility])     [place] Ovarian and endometrial CA    Diabetes ([address] [facility])     Endometrial cancer ([address] [facility])     GERD (gastroesophageal reflux disease)     H/O cardiovascular stress test 03/17/2014    EF58% normal study    H/O echocardiogram 03/17/2014    EF 55%, normal LV systolic function, mildly dilated left atrium, normal sized

## 2023-08-21 NOTE — PATIENT INSTRUCTIONS
Continue doing physical therapy  Continue using walker or cane  Continue weight bear as tolerated  Continue range of motion and exercises as instruction  Ice and elevate as needed  Tylenol or Motrin for pain  Follow up in 6 weeks with Dr. Jakub Esparza. If you have any question post operatively. Please contact office to speak with [facility]. We are committed to providing you the best care possible. If you receive a survey after visiting one of our offices, please take time to share your experience concerning your physician office visit. These surveys are confidential and no health information about you is shared. We are eager to improve for you and we are counting on your feedback to help make that happen.

## 2023-08-28 ENCOUNTER — HOSPITAL ENCOUNTER (OUTPATIENT)
Dept: PHYSICAL THERAPY | Age: 53
Setting detail: THERAPIES SERIES
Discharge: HOME | End: 2023-08-28
Payer: COMMERCIAL

## 2023-08-28 PROCEDURE — 97110 THERAPEUTIC EXERCISES: CPT

## 2023-08-28 PROCEDURE — 97140 MANUAL THERAPY 1/> REGIONS: CPT

## 2023-08-28 NOTE — FLOWSHEET NOTE
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 Pt would benefit from skilled therapy interventions as needed to address listed impairments, progress toward goal completion and improve ADL/IADL status. PT also warranted to reduce risk for further injury or decline. Subjective:  pt states her knee is still painful and she is going to stay on FMLA for a while longer, she notes that she visited with Dr Freddy Fernandez and he was happy with her ROM progress and  she is more limited on strength. She notes that she is mainly interested in walking and standing activity tolerance. She has just transitioned to the cane and is walking with a significant limp. She reports her knee is feeling 60% better overall         Any changes in Ambulatory Summary Sheet? None        Objective:   (8/28/23 ind with HEP,  R knee PROM flex 110 ext lacking 2, quad set: avg,  SLR mild lag  , walking community dist: with a cane and significant limp , stairs : 4 inch steps reciprocally with max UE support on both rails , MMT L knee flex 4-/5 ext 4-/5 )          Exercises: (No more than 4 columns)  R TKR 7/13/23  Exercise/Equipment 1# 7/28/23 2# 8/28/23 Date           WARM UP         scifit  5 min          TABLE      Quad set *10 *10    SAQ *10 2*10    Propped knee ext *3 min manual    Heel slide *10 manual       SLR  2*10     HS stretch  2*20 sec    STANDING      Weight shift    lat and AP *10 each     Calf raise  2*10     Calf stretch  2*30 sec    Marching   HHA 2*30 ft    Side stepping  HHA 2*30 ft    Sit to stand  1*10 black chair    Step up    fwd   4\"  1*10    Step up lat  4\"  1*10                 PROPRIOCEPTION      Balance board  Lat 2*1 min                            MODALITIES                      Other Therapeutic Activities/Education:  Patient received education on their current pathology and how their condition effects them with their functional activities. Patient understood discussion and questions were answered.

## 2023-08-28 NOTE — PROGRESS NOTES
Outpatient Physical Therapy           [place]           [x] Phone: [phone]   Fax: [phone]  [place]           [] Phone: [phone]   Fax: [phone]      To: Isrrael Chapman,      From: Samson North, PT, DPT     Patient: Elena Roth                    : 1970  Diagnosis:  S/P total knee arthroplasty, right [D49.480]        Treatment Diagnosis:       Date: 2023  []  Progress Note                []  Discharge Note    Evaluation Date:   23  Total Visits to date:    2 Cancels/No-shows to date:   1    Subjective:  pt states her knee is still painful and she is going to stay on FMLA for a while longer, she notes that she visited with Dr Navid Santiago and he was happy with her ROM progress and  she is more limited on strength. She notes that she is mainly interested in walking and standing activity tolerance. She has just transitioned to the cane and is walking with a significant limp. She reports her knee is feeling 60% better overall       Plan of Care/Treatment to date:  [x] Therapeutic Exercise    [] Modalities:  [x] Therapeutic Activity     [] Ultrasound  [] Electrical Stimulation  [x] Gait Training      [] Cervical Traction   [] Lumbar Traction  [x] Neuromuscular Re-education  [] Cold/hotpack [] Iontophoresis  [x] Instruction in HEP      Other:  [x] Manual Therapy       []  Vasopneumatic  [] Aquatic Therapy       []   Dry Needle Therapy                      Objective/Significant Findings At Last Visit/Comments:  ind with HEP,  R knee PROM flex 110 ext lacking 2, quad set: avg,  SLR mild lag  , walking community dist: with a cane and significant limp , stairs : 4 inch steps reciprocally with max UE support on both rails , MMT L knee flex 4-/5 ext 4-/5       Assessment:   Pt appears to be making good progress in ROM but is lacking more in gait and strength at this time. She has not been able to spend much time in therapy due to complication with her home health agency.  She has

## 2023-08-31 ENCOUNTER — HOSPITAL ENCOUNTER (OUTPATIENT)
Dept: PHYSICAL THERAPY | Age: 53
Setting detail: THERAPIES SERIES
Discharge: HOME | End: 2023-08-31
Payer: COMMERCIAL

## 2023-08-31 PROCEDURE — 97140 MANUAL THERAPY 1/> REGIONS: CPT

## 2023-08-31 PROCEDURE — 97110 THERAPEUTIC EXERCISES: CPT

## 2023-08-31 NOTE — FLOWSHEET NOTE
Outpatient Physical Therapy  [place]           [x] Phone: [phone]   Fax: [phone]  Speedy Santiago           [] Phone: [phone]   Fax: [phone]        Physical Therapy Daily Treatment Note  Date:  2023    Patient Name:  Henry Green    :  1970  MRN: [id number]  Restrictions/Precautions: No data recorded   Diagnosis:   S/P total knee arthroplasty, right [Z96.651] Diagnosis: R TKR 23  Date of Injury/Surgery:   Treatment Diagnosis:  RL knee pain, tightness , swelling , weakness, limited trf and gait  Insurance/Certification information: [facility] 36 visit for ,(  4 used previously )  Referring Physician:  Mele Nguyen DO     PCP: Susi Gallegos MD  Next Doctor Visit:    Plan of care signed (Y/N):  no  Outcome Measure: KOOS 12 pts  Visit# / total visits:   3 / 15-  Pain level:       1-2/10            L knee  Goals:     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 ind with HEP for quad strength, Rom, and trf and gait ability       not met  2. PROM R knee improved to Flex 100 ext 0      not met  3. Demo good quad set and SLR with no lag for better trf to and from bed , chairs, and walking ability     not met  4. avg pain  L knee  3/10 or less     met  Long Term Goals  Time Frame for Long Term Goals: 8-10 weeks  1. walking community distance with no AD      not met  2. stairs reciprocally with one rail as needed 1 flight              not met  3. L knee PROM flex 115 ext 0    not met  4. MMT L knee flex and ext 4+/5   not met  5. reports 75% better overall inthe L knee with ADL, household activity, and teaching   not met        Summary of Evaluation:  Assessment: Pt appears to therapy post TKR 23, she appear to be with limited quad control, gait , knee ROM, trf and gait. She as has elevated pain and swelling in the knee.  She is expecting to return to teaching in the

## 2023-09-06 ENCOUNTER — HOSPITAL ENCOUNTER (OUTPATIENT)
Dept: PHYSICAL THERAPY | Age: 53
Setting detail: THERAPIES SERIES
Discharge: HOME | End: 2023-09-06
Payer: COMMERCIAL

## 2023-09-06 PROCEDURE — 97530 THERAPEUTIC ACTIVITIES: CPT

## 2023-09-06 PROCEDURE — 97110 THERAPEUTIC EXERCISES: CPT

## 2023-09-06 NOTE — FLOWSHEET NOTE
Outpatient Physical Therapy  [place]           [x] Phone: [phone]   Fax: [phone]  Melquiades Sweeney           [] Phone: [phone]   Fax: [phone]        Physical Therapy Daily Treatment Note  Date:  2023    Patient Name:  Alyssia Hernandez    :  1970  MRN: [id number]  Restrictions/Precautions: No data recorded   Diagnosis:   S/P total knee arthroplasty, right [Z96.651] Diagnosis: R TKR 23  Date of Injury/Surgery:   Treatment Diagnosis:  RL knee pain, tightness , swelling , weakness, limited trf and gait  Insurance/Certification information: [facility] 36 visit for ,(  4 used previously )  Referring Physician:  Vinnie Linares DO     PCP: Nickolas Harrison MD  Next Doctor Visit:    Plan of care signed (Y/N):  no  Outcome Measure: KOOS 12 pts  Visit# / total visits:   4 / 15-  Pain level:       0/10 L knee currently  Goals:     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 ind with HEP for quad strength, Rom, and trf and gait ability       not met  2. PROM R knee improved to Flex 100 ext 0      not met  3. Demo good quad set and SLR with no lag for better trf to and from bed , chairs, and walking ability     not met  4. avg pain  L knee  3/10 or less     met  Long Term Goals  Time Frame for Long Term Goals: 8-10 weeks  1. walking community distance with no AD      not met  2. stairs reciprocally with one rail as needed 1 flight              not met  3. L knee PROM flex 115 ext 0    not met  4. MMT L knee flex and ext 4+/5   not met  5. reports 75% better overall inthe L knee with ADL, household activity, and teaching   not met        Summary of Evaluation:  Assessment: Pt appears to therapy post TKR 23, she appear to be with limited quad control, gait , knee ROM, trf and gait. She as has elevated pain and swelling in the knee.  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

## 2023-09-09 ENCOUNTER — HOSPITAL ENCOUNTER (OUTPATIENT)
Dept: PHYSICAL THERAPY | Age: 53
Setting detail: THERAPIES SERIES
Discharge: HOME | End: 2023-09-09
Payer: COMMERCIAL

## 2023-09-09 PROCEDURE — 97530 THERAPEUTIC ACTIVITIES: CPT

## 2023-09-09 PROCEDURE — 97110 THERAPEUTIC EXERCISES: CPT

## 2023-09-09 NOTE — FLOWSHEET NOTE
Outpatient Physical Therapy  [place]           [x] Phone: [phone]   Fax: [phone]  Nicole Marie           [] Phone: [phone]   Fax: [phone]        Physical Therapy Daily Treatment Note  Date:  2023    Patient Name:  Miguelangel Noble    :  1970  MRN: [id number]  Restrictions/Precautions: No data recorded   Diagnosis:   S/P total knee arthroplasty, right [Z96.651] Diagnosis: R TKR 23  Date of Injury/Surgery:   Treatment Diagnosis:  RL knee pain, tightness , swelling , weakness, limited trf and gait  Insurance/Certification information: [facility] 36 visit for ,(  4 used previously )  Referring Physician:  Candelario eHnning DO     PCP: Joanna Brown MD  Next Doctor Visit:    Plan of care signed (Y/N):  no  Outcome Measure: KOOS 12 pts  Visit# / total visits:   5 / 15-  Pain level:       0/10 L knee currently  Goals:     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 ind with HEP for quad strength, Rom, and trf and gait ability       not met  2. PROM R knee improved to Flex 100 ext 0      not met  3. Demo good quad set and SLR with no lag for better trf to and from bed , chairs, and walking ability     not met  4. avg pain  L knee  3/10 or less     met  Long Term Goals  Time Frame for Long Term Goals: 8-10 weeks  1. walking community distance with no AD      not met  2. stairs reciprocally with one rail as needed 1 flight              not met  3. L knee PROM flex 115 ext 0    not met  4. MMT L knee flex and ext 4+/5   not met  5. reports 75% better overall inthe L knee with ADL, household activity, and teaching   not met        Summary of Evaluation:  Assessment: Pt appears to therapy post TKR 23, she appear to be with limited quad control, gait , knee ROM, trf and gait. She as has elevated pain and swelling in the knee.  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

## 2023-09-11 ENCOUNTER — HOSPITAL ENCOUNTER (OUTPATIENT)
Dept: PHYSICAL THERAPY | Age: 53
Discharge: HOME | End: 2023-09-11

## 2023-09-11 NOTE — FLOWSHEET NOTE
Physical Therapy  Cancellation/No-show Note  Patient Name:  Lizette Tovar  :  1970   Date:  2023  Cancelled visits to date: 2  No-shows to date: 0    For today's appointment patient:  [x]  Cancelled  []  Rescheduled appointment  []  No-show     Reason given by patient:  [x]  Patient ill  []  Conflicting appointment  []  No transportation    []  Conflict with work  []  No reason given  []  Other:     Comments:      Electronically signed by:  Wolfgang Iraheta PTA

## 2023-09-13 ENCOUNTER — HOSPITAL ENCOUNTER (OUTPATIENT)
Dept: PHYSICAL THERAPY | Age: 53
Setting detail: THERAPIES SERIES
Discharge: HOME | End: 2023-09-13
Payer: COMMERCIAL

## 2023-09-13 PROCEDURE — 97110 THERAPEUTIC EXERCISES: CPT

## 2023-09-13 PROCEDURE — 97140 MANUAL THERAPY 1/> REGIONS: CPT

## 2023-09-13 PROCEDURE — 97530 THERAPEUTIC ACTIVITIES: CPT

## 2023-09-13 NOTE — FLOWSHEET NOTE
(15)    1 man  (10 )     Next Progress Note due:   9/28/23    Plan of Care Interventions:  [x] Therapeutic Exercise  [x] Modalities: PRN  [x] Therapeutic Activity     [] Ultrasound  [x] Estim  [x] Gait Training      [] Cervical Traction [] Lumbar Traction  [x] Neuromuscular Re-education    [x] Cold/hotpack [] Iontophoresis   [x] Instruction in HEP      [x] Vasopneumatic   [] Dry Needling    [x] Manual Therapy               [] Aquatic Therapy            Electronically signed by:  Sina Arcos, PT         9/13/2023, 6:41 AM

## 2023-09-18 ENCOUNTER — HOSPITAL ENCOUNTER (OUTPATIENT)
Dept: PHYSICAL THERAPY | Age: 53
Setting detail: THERAPIES SERIES
Discharge: HOME | End: 2023-09-18
Payer: COMMERCIAL

## 2023-09-18 PROCEDURE — 97110 THERAPEUTIC EXERCISES: CPT

## 2023-09-18 PROCEDURE — 97530 THERAPEUTIC ACTIVITIES: CPT

## 2023-09-18 PROCEDURE — 97112 NEUROMUSCULAR REEDUCATION: CPT

## 2023-09-18 NOTE — FLOWSHEET NOTE
Outpatient Physical Therapy  [place]           [x] Phone: [phone]   Fax: [phone]  Stephanie Burrell           [] Phone: [phone]   Fax: [phone]        Physical Therapy Daily Treatment Note  Date:  2023    Patient Name:  Arelis Cotto    :  1970  MRN: [id number]  Restrictions/Precautions: No data recorded   Diagnosis:   S/P total knee arthroplasty, right [Z96.651] Diagnosis: R TKR 23  Date of Injury/Surgery:   Treatment Diagnosis:  RL knee pain, tightness , swelling , weakness, limited trf and gait  Insurance/Certification information: [facility] 36 visit for ,(  4 used previously )  Referring Physician:  Tyson Hill DO     PCP: Martha Smith MD  Next Doctor Visit:  Oct 2  Plan of care signed (Y/N):  no  Outcome Measure: KOOS 12 pts  Visit# / total visits:   7 / 15-  Pain level:       0/10 L knee currently  Goals:     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 ind with HEP for quad strength, Rom, and trf and gait ability       not met  2. PROM R knee improved to Flex 100 ext 0      not met  3. Demo good quad set and SLR with no lag for better trf to and from bed , chairs, and walking ability     not met  4. avg pain  L knee  3/10 or less     met  Long Term Goals  Time Frame for Long Term Goals: 8-10 weeks  1. walking community distance with no AD      not met  2. stairs reciprocally with one rail as needed 1 flight              not met  3. L knee PROM flex 115 ext 0    not met  4. MMT L knee flex and ext 4+/5   not met  5. reports 75% better overall inthe L knee with ADL, household activity, and teaching   not met        Summary of Evaluation:  Assessment: Pt appears to therapy post TKR 23, she appear to be with limited quad control, gait , knee ROM, trf and gait. She as has elevated pain and swelling in the knee.  She is expecting to return to teaching in the

## 2023-09-20 ENCOUNTER — HOSPITAL ENCOUNTER (OUTPATIENT)
Dept: PHYSICAL THERAPY | Age: 53
Setting detail: THERAPIES SERIES
Discharge: HOME | End: 2023-09-20
Payer: COMMERCIAL

## 2023-09-20 PROCEDURE — 97530 THERAPEUTIC ACTIVITIES: CPT

## 2023-09-20 PROCEDURE — 97112 NEUROMUSCULAR REEDUCATION: CPT

## 2023-09-20 PROCEDURE — 97110 THERAPEUTIC EXERCISES: CPT

## 2023-09-20 NOTE — FLOWSHEET NOTE
Outpatient Physical Therapy  [place]           [x] Phone: [phone]   Fax: [phone]  [facility]           [] Phone: [phone]   Fax: [phone]        Physical Therapy Daily Treatment Note  Date:  2023    Patient Name:  Amy James    :  1970  MRN: [id number]  Restrictions/Precautions: No data recorded   Diagnosis:   S/P total knee arthroplasty, right [Z96.651] Diagnosis: R TKR 23  Date of Injury/Surgery:   Treatment Diagnosis:  RL knee pain, tightness , swelling , weakness, limited trf and gait  Insurance/Certification information: [facility] 36 visit for ,(  4 used previously )  Referring Physician:  Kevyn King DO     PCP: Dian Choe MD  Next Doctor Visit:  Oct 2  Plan of care signed (Y/N):  no  Outcome Measure: KOOS 12 pts  Visit# / total visits:   8 / 15-  Pain level:       3/10 L knee currently  Goals:     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 ind with HEP for quad strength, Rom, and trf and gait ability       not met  2. PROM R knee improved to Flex 100 ext 0      not met  3. Demo good quad set and SLR with no lag for better trf to and from bed , chairs, and walking ability     not met  4. avg pain  L knee  3/10 or less     met  Long Term Goals  Time Frame for Long Term Goals: 8-10 weeks  1. walking community distance with no AD      not met  2. stairs reciprocally with one rail as needed 1 flight              not met  3. L knee PROM flex 115 ext 0    not met  4. MMT L knee flex and ext 4+/5   not met  5. reports 75% better overall inthe L knee with ADL, household activity, and teaching   not met        Summary of Evaluation:  Assessment: Pt appears to therapy post TKR 23, she appear to be with limited quad control, gait , knee ROM, trf and gait. She as has elevated pain and swelling in the knee.  She is expecting to return to teaching in the

## 2023-10-02 ENCOUNTER — OFFICE VISIT (OUTPATIENT)
Dept: ORTHOPEDIC SURGERY | Age: 53
End: 2023-10-02

## 2023-10-02 VITALS
WEIGHT: 293 LBS | TEMPERATURE: 97.6 F | BODY MASS INDEX: 50.02 KG/M2 | OXYGEN SATURATION: 97 % | RESPIRATION RATE: 16 BRPM | HEIGHT: 64 IN | HEART RATE: 92 BPM

## 2023-10-02 DIAGNOSIS — Z96.651 S/P TOTAL KNEE ARTHROPLASTY, RIGHT: Primary | ICD-10-CM

## 2023-10-02 PROCEDURE — 99024 POSTOP FOLLOW-UP VISIT: CPT | Performed by: PHYSICIAN ASSISTANT

## 2023-10-02 NOTE — PATIENT INSTRUCTIONS
Patient may follow-up at his 1 year geo with Dr. Sabi Phillip. Continue working on range of motion and strengthening as needed. Call office with any questions. For any dental procedures or other invasive medical procedures you will need to take antibiotics prior to your procedure. Call our office prior to any dental procedure and we will send in a prescription for you. Antibiotics for other medical procedures are usually taken care of by the doctor or clinician performing the procedure. If you have any question post operatively. Please contact office to speak with [facility]. We are committed to providing you the best care possible. If you receive a survey after visiting one of our offices, please take time to share your experience concerning your physician office visit. These surveys are confidential and no health information about you is shared. We are eager to improve for you and we are counting on your feedback to help make that happen.

## 2023-12-21 ENCOUNTER — OFFICE VISIT (OUTPATIENT)
Dept: CARDIOLOGY CLINIC | Age: 53
End: 2023-12-21

## 2023-12-21 VITALS
BODY MASS INDEX: 50.02 KG/M2 | HEIGHT: 64 IN | WEIGHT: 293 LBS | HEART RATE: 89 BPM | OXYGEN SATURATION: 97 % | DIASTOLIC BLOOD PRESSURE: 72 MMHG | SYSTOLIC BLOOD PRESSURE: 138 MMHG

## 2023-12-21 DIAGNOSIS — E78.5 DYSLIPIDEMIA: ICD-10-CM

## 2023-12-21 DIAGNOSIS — I10 PRIMARY HYPERTENSION: Primary | ICD-10-CM

## 2023-12-21 DIAGNOSIS — E11.9 TYPE 2 DIABETES MELLITUS WITHOUT COMPLICATION, WITHOUT LONG-TERM CURRENT USE OF INSULIN (HCC): ICD-10-CM

## 2023-12-21 DIAGNOSIS — E66.01 CLASS 3 SEVERE OBESITY WITH SERIOUS COMORBIDITY AND BODY MASS INDEX (BMI) OF 50.0 TO 59.9 IN ADULT, UNSPECIFIED OBESITY TYPE (HCC): ICD-10-CM

## 2023-12-21 RX ORDER — LISINOPRIL 2.5 MG/1
2.5 TABLET ORAL DAILY
Qty: 30 TABLET | Refills: 3 | Status: SHIPPED | OUTPATIENT
Start: 2023-12-21

## 2023-12-21 NOTE — PATIENT INSTRUCTIONS
Please be informed that if you contact our office outside of normal business hours the physician on call cannot help with any scheduling or rescheduling issues, procedure instruction questions or any type of medication issue. We advise you for any urgent/emergency that you go to the nearest emergency room! PLEASE CALL OUR OFFICE DURING NORMAL BUSINESS HOURS    Monday - Friday   8 am to 5 pm    [place]: [address]: [phone]    [place]:  [phone]    **It is YOUR responsibilty to bring medication bottles and/or updated medication list to [facility]. This will allow us to better serve you and all your healthcare needs**    Thank you for allowing us to care for you today! We want to ensure we can follow your treatment plan and we strive to give you the best outcomes and experience possible. If you ever have a life threatening emergency and call 911 - for an ambulance (EMS)   Our providers can only care for you at:   [facility] or [facility]. Even if you have someone take you or you drive yourself we can only care for you in a [facility] facility. Our providers are not setup at the other healthcare locations! We are committed to providing you the best care possible. If you receive a survey after visiting one of our offices, please take time to share your experience concerning your physician office visit. These surveys are confidential and no health information about you is shared. We are eager to improve for you and we are counting on your feedback to help make that happen.

## 2023-12-21 NOTE — PROGRESS NOTES
CARDIOLOGY  NOTE    2023    Madelaine Faye (:  1970) is a 48 y.o. female,an established patient with Dr. Beto Scales, here for evaluation of the following chief complaint(s):  1 Year Follow Up (Pt denies other cardiac symptoms) and Edema (Both legs sometimes)        SUBJECTIVE/OBJECTIVE:    HPI  Sylvia Hong  who has Past medical history as noted below. He states that he is feeling well. He denies any issues     Sylvia Hong comes in for evaluation before her knee surgery she does report shortness of breath exertion she had a colonoscopy last year and then a hysterectomy in 2017 but no recent cardiac work-up or stress test.  She denies any chest pain but reports shortness of breath with exertion but that could be related to deconditioning related  Dad has heart problems   History of ovarian cancer s/p chemo currently in remission      Review of Systems   Constitutional:  Negative for fatigue and fever. Respiratory:  Negative for cough and shortness of breath. Cardiovascular:  Negative for chest pain, palpitations and leg swelling. Musculoskeletal:  Negative for arthralgias and gait problem. Neurological:  Negative for dizziness, syncope, weakness, light-headedness and headaches. Vitals:    23 1605   BP: 138/72   Site: Left Upper Arm   Position: Sitting   Cuff Size: Large Adult   Pulse: 89   SpO2: 97%   Weight: (!) 159.2 kg (351 lb)   Height: 1.626 m (5' 4\")       Wt Readings from Last 3 Encounters:   23 (!) 159.2 kg (351 lb)   10/02/23 (!) 158.8 kg (350 lb)   23 (!) 159.7 kg (352 lb)       BP Readings from Last 3 Encounters:   23 138/72   23 131/67   23 111/67       Prior to Admission medications    Medication Sig Start Date End Date Taking?  Authorizing Provider   pantoprazole (PROTONIX) 40 MG tablet Take 1 tablet by mouth daily   Yes Provider, Historical, MD   TRULICITY 3.29 KB/8.3ON SOPN 0.75 mg once a week Takes on Friday 9/3/22  Yes Masoud Silva MD

## 2024-01-22 ENCOUNTER — HOSPITAL ENCOUNTER (OUTPATIENT)
Age: 54
Discharge: HOME | End: 2024-01-22
Payer: COMMERCIAL

## 2024-01-22 DIAGNOSIS — I10 PRIMARY HYPERTENSION: ICD-10-CM

## 2024-01-22 DIAGNOSIS — E11.9 TYPE 2 DIABETES MELLITUS WITHOUT COMPLICATION, WITHOUT LONG-TERM CURRENT USE OF INSULIN (HCC): ICD-10-CM

## 2024-01-22 DIAGNOSIS — E66.01 CLASS 3 SEVERE OBESITY WITH SERIOUS COMORBIDITY AND BODY MASS INDEX (BMI) OF 50.0 TO 59.9 IN ADULT, UNSPECIFIED OBESITY TYPE (HCC): ICD-10-CM

## 2024-01-22 LAB
ANION GAP SERPL CALCULATED.3IONS-SCNC: 14 MMOL/L (ref 7–16)
BUN SERPL-MCNC: 12 MG/DL (ref 6–23)
CALCIUM SERPL-MCNC: 9.2 MG/DL (ref 8.3–10.6)
CHLORIDE BLD-SCNC: 102 MMOL/L (ref 99–110)
CO2: 25 MMOL/L (ref 21–32)
CREAT SERPL-MCNC: 0.7 MG/DL (ref 0.6–1.1)
GFR SERPL CREATININE-BSD FRML MDRD: >60 ML/MIN/1.73M2
GLUCOSE SERPL-MCNC: 151 MG/DL (ref 70–99)
POTASSIUM SERPL-SCNC: 4.5 MMOL/L (ref 3.5–5.1)
SODIUM BLD-SCNC: 141 MMOL/L (ref 135–145)

## 2024-01-22 PROCEDURE — 80048 BASIC METABOLIC PNL TOTAL CA: CPT

## 2024-01-22 PROCEDURE — 36415 COLL VENOUS BLD VENIPUNCTURE: CPT

## 2024-01-23 ENCOUNTER — NURSE ONLY (OUTPATIENT)
Dept: CARDIOLOGY CLINIC | Age: 54
End: 2024-01-23
Payer: COMMERCIAL

## 2024-01-23 VITALS
SYSTOLIC BLOOD PRESSURE: 138 MMHG | HEIGHT: 64 IN | OXYGEN SATURATION: 100 % | WEIGHT: 293 LBS | BODY MASS INDEX: 50.02 KG/M2 | DIASTOLIC BLOOD PRESSURE: 74 MMHG | HEART RATE: 88 BPM

## 2024-01-23 DIAGNOSIS — I10 PRIMARY HYPERTENSION: Primary | ICD-10-CM

## 2024-01-23 PROCEDURE — 3078F DIAST BP <80 MM HG: CPT | Performed by: NURSE PRACTITIONER

## 2024-01-23 PROCEDURE — 99214 OFFICE O/P EST MOD 30 MIN: CPT | Performed by: NURSE PRACTITIONER

## 2024-01-23 PROCEDURE — 3075F SYST BP GE 130 - 139MM HG: CPT | Performed by: NURSE PRACTITIONER

## 2024-01-23 RX ORDER — LISINOPRIL 5 MG/1
5 TABLET ORAL DAILY
Qty: 30 TABLET | Refills: 1 | Status: SHIPPED | OUTPATIENT
Start: 2024-01-23

## 2024-01-23 NOTE — PATIENT INSTRUCTIONS
Please be informed that if you contact our office outside of normal business hours the physician on call cannot help with any scheduling or rescheduling issues, procedure instruction questions or any type of medication issue.    We advise you for any urgent/emergency that you go to the nearest emergency room!    PLEASE CALL OUR OFFICE DURING NORMAL BUSINESS HOURS    Monday - Friday   8 am to 5 pm    [place]: [phone]    [place]: [phone]    [place]:  [phone]    **It is YOUR responsibilty to bring medication bottles and/or updated medication list to EACH APPOINTMENT. This will allow us to better serve you and all your healthcare needs**    Thank you for allowing us to care for you today!   We want to ensure we can follow your treatment plan and we strive to give you the best outcomes and experience possible.   If you ever have a life threatening emergency and call 911 - for an ambulance (EMS)   Our providers can only care for you at:   [facility] or [facility].   Even if you have someone take you or you drive yourself we can only care for you in a [facility] facility. Our providers are not setup at the other healthcare locations!     We are committed to providing you the best care possible.    If you receive a survey after visiting one of our offices, please take time to share your experience concerning your physician office visit.  These surveys are confidential and no health information about you is shared.    We are eager to improve for you and we are counting on your feedback to help make that happen.

## 2024-01-23 NOTE — PROGRESS NOTES
Here for BP check. Started lisinopril for renal protection given diabetic.     Her blood pressure is a little elevated.   .  Vitals:    01/23/24 1558 01/23/24 1603   BP: (!) 148/72 138/74   Site: Left Upper Arm Left Upper Arm   Position: Sitting Sitting   Cuff Size: Large Adult Large Adult   Pulse: 88    SpO2: 100%    Weight: (!) 155.6 kg (343 lb)    Height: 1.626 m (5' 4\")      Increase lisinopril to 5 mg Daily.     Goal BP < 130/80    BP check in 2 weeks

## 2024-02-06 ENCOUNTER — NURSE ONLY (OUTPATIENT)
Dept: CARDIOLOGY CLINIC | Age: 54
End: 2024-02-06
Payer: COMMERCIAL

## 2024-02-06 VITALS
HEART RATE: 97 BPM | OXYGEN SATURATION: 98 % | DIASTOLIC BLOOD PRESSURE: 80 MMHG | WEIGHT: 293 LBS | HEIGHT: 64 IN | SYSTOLIC BLOOD PRESSURE: 118 MMHG | BODY MASS INDEX: 50.02 KG/M2

## 2024-02-06 DIAGNOSIS — I10 PRIMARY HYPERTENSION: Primary | ICD-10-CM

## 2024-02-06 PROCEDURE — 99211 OFF/OP EST MAY X REQ PHY/QHP: CPT | Performed by: NURSE PRACTITIONER

## 2024-02-06 PROCEDURE — 3074F SYST BP LT 130 MM HG: CPT | Performed by: NURSE PRACTITIONER

## 2024-02-06 PROCEDURE — 3079F DIAST BP 80-89 MM HG: CPT | Performed by: NURSE PRACTITIONER

## 2024-02-06 NOTE — PROGRESS NOTES
Here for BP check. Lisinopril was increased to 5 mg Daily last OV.      Vitals:    02/06/24 1610   BP: 118/80   Site: Left Lower Arm   Position: Sitting   Cuff Size: Medium Adult   Pulse: 97   SpO2: 98%   Weight: (!) 158.4 kg (349 lb 3.2 oz)   Height: 1.626 m (5' 4.02\")       Check BMP   Continue  lisinopril  5 mg Daily.     Goal BP < 130/80

## 2024-02-06 NOTE — PATIENT INSTRUCTIONS
Please be informed that if you contact our office outside of normal business hours the physician on call cannot help with any scheduling or rescheduling issues, procedure instruction questions or any type of medication issue.    We advise you for any urgent/emergency that you go to the nearest emergency room!    PLEASE CALL OUR OFFICE DURING NORMAL BUSINESS HOURS    Monday - Friday   8 am to 5 pm    [place]: [phone]    [place]: [phone]    [place]:  [phone]    **It is YOUR responsibilty to bring medication bottles and/or updated medication list to EACH APPOINTMENT. This will allow us to better serve you and all your healthcare needs**    Thank you for allowing us to care for you today!   We want to ensure we can follow your treatment plan and we strive to give you the best outcomes and experience possible.   If you ever have a life threatening emergency and call 911 - for an ambulance (EMS)   Our providers can only care for you at:   [facility] or [facility].   Even if you have someone take you or you drive yourself we can only care for you in a [facility] facility. Our providers are not setup at the other healthcare locations!

## 2024-03-18 RX ORDER — LISINOPRIL 5 MG/1
5 TABLET ORAL DAILY
Qty: 30 TABLET | Refills: 5 | Status: SHIPPED | OUTPATIENT
Start: 2024-03-18

## 2024-04-22 ENCOUNTER — HOSPITAL ENCOUNTER (OUTPATIENT)
Dept: ULTRASOUND IMAGING | Age: 54
Discharge: HOME | End: 2024-04-22
Attending: SPECIALIST

## 2024-04-22 ENCOUNTER — HOSPITAL ENCOUNTER (OUTPATIENT)
Dept: ULTRASOUND IMAGING | Age: 54
Discharge: HOME | End: 2024-04-22
Attending: SPECIALIST
Payer: COMMERCIAL

## 2024-04-22 DIAGNOSIS — N28.89 URETERAL FISTULA: ICD-10-CM

## 2024-04-22 PROCEDURE — 76775 US EXAM ABDO BACK WALL LIM: CPT

## 2024-05-13 ENCOUNTER — OFFICE VISIT (OUTPATIENT)
Dept: CARDIOLOGY CLINIC | Age: 54
End: 2024-05-13
Payer: COMMERCIAL

## 2024-05-13 VITALS
SYSTOLIC BLOOD PRESSURE: 146 MMHG | WEIGHT: 293 LBS | HEIGHT: 64 IN | OXYGEN SATURATION: 97 % | BODY MASS INDEX: 50.02 KG/M2 | DIASTOLIC BLOOD PRESSURE: 78 MMHG | HEART RATE: 92 BPM

## 2024-05-13 DIAGNOSIS — I10 PRIMARY HYPERTENSION: Primary | ICD-10-CM

## 2024-05-13 DIAGNOSIS — R22.43 LOCALIZED SWELLING OF BOTH LOWER LEGS: ICD-10-CM

## 2024-05-13 DIAGNOSIS — Z82.49 FH: CAD (CORONARY ARTERY DISEASE): ICD-10-CM

## 2024-05-13 DIAGNOSIS — E78.5 DYSLIPIDEMIA: ICD-10-CM

## 2024-05-13 DIAGNOSIS — E66.01 CLASS 3 SEVERE OBESITY WITH SERIOUS COMORBIDITY AND BODY MASS INDEX (BMI) OF 50.0 TO 59.9 IN ADULT, UNSPECIFIED OBESITY TYPE (HCC): ICD-10-CM

## 2024-05-13 DIAGNOSIS — E11.9 TYPE 2 DIABETES MELLITUS WITHOUT COMPLICATION, WITHOUT LONG-TERM CURRENT USE OF INSULIN (HCC): ICD-10-CM

## 2024-05-13 PROCEDURE — 2022F DILAT RTA XM EVC RTNOPTHY: CPT | Performed by: NURSE PRACTITIONER

## 2024-05-13 PROCEDURE — G8427 DOCREV CUR MEDS BY ELIG CLIN: HCPCS | Performed by: NURSE PRACTITIONER

## 2024-05-13 PROCEDURE — 99214 OFFICE O/P EST MOD 30 MIN: CPT | Performed by: NURSE PRACTITIONER

## 2024-05-13 PROCEDURE — 1036F TOBACCO NON-USER: CPT | Performed by: NURSE PRACTITIONER

## 2024-05-13 PROCEDURE — 3046F HEMOGLOBIN A1C LEVEL >9.0%: CPT | Performed by: NURSE PRACTITIONER

## 2024-05-13 PROCEDURE — 3077F SYST BP >= 140 MM HG: CPT | Performed by: NURSE PRACTITIONER

## 2024-05-13 PROCEDURE — 3078F DIAST BP <80 MM HG: CPT | Performed by: NURSE PRACTITIONER

## 2024-05-13 PROCEDURE — 93000 ELECTROCARDIOGRAM COMPLETE: CPT | Performed by: NURSE PRACTITIONER

## 2024-05-13 PROCEDURE — 3017F COLORECTAL CA SCREEN DOC REV: CPT | Performed by: NURSE PRACTITIONER

## 2024-05-13 PROCEDURE — G8417 CALC BMI ABV UP PARAM F/U: HCPCS | Performed by: NURSE PRACTITIONER

## 2024-05-13 RX ORDER — BLOOD PRESSURE TEST KIT
1 KIT MISCELLANEOUS DAILY
Qty: 1 KIT | Refills: 0 | Status: SHIPPED | OUTPATIENT
Start: 2024-05-13

## 2024-05-13 ASSESSMENT — ENCOUNTER SYMPTOMS
SHORTNESS OF BREATH: 0
COUGH: 0

## 2024-05-13 NOTE — PROGRESS NOTES
CARDIOLOGY  NOTE    2024    Edna Harris (:  1970) is a 53 y.o. female,an established patient with Dr. Kincaid, here for evaluation of the following chief complaint(s):  Follow-up (Pt denies any cardiac sx)        SUBJECTIVE/OBJECTIVE:    HPI  Edna  who has Past medical history as noted below.      She states that he is feeling well.  She states that while on vacation she had lower leg swelling. But since home no swelling.     Edna comes in for evaluation before her knee surgery she does report shortness of breath exertion she had a colonoscopy last year and then a hysterectomy in 2017 but no recent cardiac work-up or stress test.  She denies any chest pain but reports shortness of breath with exertion but that could be related to deconditioning related  Dad has heart problems   History of ovarian cancer s/p chemo currently in remission      Review of Systems   Constitutional:  Negative for fatigue and fever.   Respiratory:  Negative for cough and shortness of breath.    Cardiovascular:  Negative for chest pain, palpitations and leg swelling.   Musculoskeletal:  Negative for arthralgias and gait problem.   Neurological:  Negative for dizziness, syncope, weakness, light-headedness and headaches.       Vitals:    24 1535 24 1538   BP: (!) 142/80 (!) 146/78   Site: Left Upper Arm Right Upper Arm   Position: Sitting Sitting   Cuff Size: Large Adult Large Adult   Pulse: 92    SpO2: 97%    Weight: (!) 156.9 kg (345 lb 12.8 oz)    Height: 1.626 m (5' 4\")        Wt Readings from Last 3 Encounters:   24 (!) 156.9 kg (345 lb 12.8 oz)   24 (!) 158.4 kg (349 lb 3.2 oz)   24 (!) 155.6 kg (343 lb)       BP Readings from Last 3 Encounters:   24 (!) 146/78   24 118/80   24 138/74       Prior to Admission medications    Medication Sig Start Date End Date Taking? Authorizing Provider   lisinopril (PRINIVIL;ZESTRIL) 5 MG tablet Take 1 tablet by mouth daily 3/18/24

## 2024-06-27 ENCOUNTER — NURSE ONLY (OUTPATIENT)
Dept: CARDIOLOGY CLINIC | Age: 54
End: 2024-06-27
Payer: COMMERCIAL

## 2024-06-27 VITALS
OXYGEN SATURATION: 96 % | HEART RATE: 90 BPM | HEIGHT: 64 IN | DIASTOLIC BLOOD PRESSURE: 72 MMHG | WEIGHT: 293 LBS | BODY MASS INDEX: 50.02 KG/M2 | SYSTOLIC BLOOD PRESSURE: 140 MMHG

## 2024-06-27 DIAGNOSIS — I10 HYPERTENSION, UNSPECIFIED TYPE: Primary | ICD-10-CM

## 2024-06-27 PROCEDURE — 3077F SYST BP >= 140 MM HG: CPT | Performed by: NURSE PRACTITIONER

## 2024-06-27 PROCEDURE — 3078F DIAST BP <80 MM HG: CPT | Performed by: NURSE PRACTITIONER

## 2024-06-27 PROCEDURE — 99214 OFFICE O/P EST MOD 30 MIN: CPT | Performed by: NURSE PRACTITIONER

## 2024-06-27 RX ORDER — LISINOPRIL 5 MG/1
5 TABLET ORAL 2 TIMES DAILY
Qty: 60 TABLET | Refills: 5 | Status: SHIPPED | OUTPATIENT
Start: 2024-06-27

## 2024-06-27 NOTE — PROGRESS NOTES
Blood pressure check      Visit Date: 6/27/2024  Cardiologist:  Dr. Kincaid  Primary Care Physician: Chelsi Crouch MD    Pt is here for a 4 week BP check.    Wt Readings from Last 3 Encounters:   06/27/24 (!) 155.2 kg (342 lb 3.2 oz)   05/13/24 (!) 156.9 kg (345 lb 12.8 oz)   02/06/24 (!) 158.4 kg (349 lb 3.2 oz)     BP Readings from Last 3 Encounters:   06/27/24 (!) 140/72   05/13/24 (!) 146/78   02/06/24 118/80     Pulse Readings from Last 3 Encounters:   06/27/24 90   05/13/24 92   02/06/24 97        Plan for pt is to increase lisinopril to 5 mg BID, hold for SBP < 110.     Follow up in 4 weeks with bp check    Pt is to report any dizziness or syncope to the office    Electronically signed by ADRIANE Weaver CNP on 6/27/2024 at 4:30 PM

## 2024-06-28 ENCOUNTER — TELEPHONE (OUTPATIENT)
Dept: CARDIOLOGY CLINIC | Age: 54
End: 2024-06-28

## 2024-06-28 NOTE — TELEPHONE ENCOUNTER
Pt was seen on 6/27 and did not get f/u scheduled. Per tiff I scheduled a 1 mo bp check scheduled. Pt voiced confirmation of appt made.

## 2024-07-22 ENCOUNTER — NURSE ONLY (OUTPATIENT)
Dept: CARDIOLOGY CLINIC | Age: 54
End: 2024-07-22
Payer: COMMERCIAL

## 2024-07-22 ENCOUNTER — LAB (OUTPATIENT)
Dept: CARDIOLOGY CLINIC | Age: 54
End: 2024-07-22

## 2024-07-22 VITALS
DIASTOLIC BLOOD PRESSURE: 78 MMHG | WEIGHT: 293 LBS | SYSTOLIC BLOOD PRESSURE: 148 MMHG | BODY MASS INDEX: 50.02 KG/M2 | HEIGHT: 64 IN | HEART RATE: 91 BPM | OXYGEN SATURATION: 93 %

## 2024-07-22 DIAGNOSIS — I10 PRIMARY HYPERTENSION: Primary | ICD-10-CM

## 2024-07-22 DIAGNOSIS — I10 PRIMARY HYPERTENSION: ICD-10-CM

## 2024-07-22 PROCEDURE — 3078F DIAST BP <80 MM HG: CPT | Performed by: NURSE PRACTITIONER

## 2024-07-22 PROCEDURE — 99214 OFFICE O/P EST MOD 30 MIN: CPT | Performed by: NURSE PRACTITIONER

## 2024-07-22 PROCEDURE — 3077F SYST BP >= 140 MM HG: CPT | Performed by: NURSE PRACTITIONER

## 2024-07-22 NOTE — PROGRESS NOTES
Blood pressure check      Visit Date: 7/22/2024  Cardiologist:  Dr. Kincaid  Primary Care Physician: Chelsi Crouch MD    Pt is here for a 4 week BP check. We increased lisinopril to 5 mg BID, hold for SBP < 110.   She states that she has been doing fairly well. Blood pressure log is higher than goal     Wt Readings from Last 3 Encounters:   06/27/24 (!) 155.2 kg (342 lb 3.2 oz)   05/13/24 (!) 156.9 kg (345 lb 12.8 oz)   02/06/24 (!) 158.4 kg (349 lb 3.2 oz)     BP Readings from Last 3 Encounters:   06/27/24 (!) 140/72   05/13/24 (!) 146/78   02/06/24 118/80     Pulse Readings from Last 3 Encounters:   06/27/24 90   05/13/24 92   02/06/24 97        Plan for pt is to increase lisinopril to 10 mg BID if BMP normal.     Follow up in 4 weeks with bp check    Pt is to report any dizziness or syncope to the office    Electronically signed by ADRIANE Weaver CNP on 7/22/2024 at 12:49 PM

## 2024-07-23 ENCOUNTER — TELEPHONE (OUTPATIENT)
Dept: CARDIOLOGY CLINIC | Age: 54
End: 2024-07-23

## 2024-07-23 LAB
ANION GAP SERPL CALCULATED.3IONS-SCNC: 17 MMOL/L (ref 3–16)
BUN SERPL-MCNC: 14 MG/DL (ref 7–20)
CALCIUM SERPL-MCNC: 9.5 MG/DL (ref 8.3–10.6)
CHLORIDE SERPL-SCNC: 100 MMOL/L (ref 99–110)
CO2 SERPL-SCNC: 24 MMOL/L (ref 21–32)
CREAT SERPL-MCNC: 1 MG/DL (ref 0.6–1.1)
GFR SERPLBLD CREATININE-BSD FMLA CKD-EPI: 67 ML/MIN/{1.73_M2}
GLUCOSE SERPL-MCNC: 284 MG/DL (ref 70–99)
POTASSIUM SERPL-SCNC: 4.5 MMOL/L (ref 3.5–5.1)
SODIUM SERPL-SCNC: 141 MMOL/L (ref 136–145)

## 2024-07-23 RX ORDER — LISINOPRIL 10 MG/1
10 TABLET ORAL 2 TIMES DAILY
Qty: 60 TABLET | Refills: 1 | Status: SHIPPED | OUTPATIENT
Start: 2024-07-23

## 2024-07-23 NOTE — TELEPHONE ENCOUNTER
----- Message from ADRIANE Weaver CNP sent at 7/23/2024  8:37 AM EDT -----    ----- Message -----  From: [facility] Incoming Lab Results From Soft (Epic Adt)  Sent: 7/23/2024   6:03 AM EDT  To: ADRIANE Weaver CNP

## 2024-07-23 NOTE — TELEPHONE ENCOUNTER
Pt returned call about lab results. Pt voiced understanding of current plan and next ov at this time.

## 2024-07-23 NOTE — TELEPHONE ENCOUNTER
Attempted to call pt to provide with testing results. Left message with call back number at this time.               Component  Ref Range & Units 1 d ago  (7/22/24) 6 mo ago  (1/22/24) 1 yr ago  (6/29/23) 1 yr ago  (4/13/23) 1 yr ago  (1/16/23) 1 yr ago  (11/29/22) 6 yr ago  (12/1/17)   Sodium  136 - 145 mmol/L 141 141 R 140 R 137 R 143 R 142 R 126 Low  R   Potassium  3.5 - 5.1 mmol/L 4.5 4.5 R 4.1 R 4.3 R 4.0 R 4.4 R 4.7 R   Chloride  99 - 110 mmol/L 100 102 R 101 R 101 R 103 R 105 R 89 Low  R   CO2  21 - 32 mmol/L 24 25 R 24 R 25 R 26 R 25 R 23 R   Anion Gap  3 - 16 17 High  14 R 15 R 11 R 14 R 12 R 14 R   Glucose  70 - 99 mg/dL 284 High  151 High  R 89 R 167 High  R 93 R 169 High  R 325 High  R   BUN  7 - 20 mg/dL 14 12 R 15 R 15 R 15 R 15 R 12 R   Creatinine  0.6 - 1.1 mg/dL 1.0 0.7 R 0.8 R 0.7 R 0.9 R 0.8 R 0.7 R   Est, Glom Filt Rate  >60 67 >60 R, CM >60 R, CM >60 R, CM >60 R, CM >60 R, CM       Calcium  8.3 - 10.6 mg/dL 9.5 9.2 R 9.3 R 8.9 R 9.5 R 9.5 R 9.5 R   Albumin   4.0 R  4.1 R 4.4 R 3.4 R   Total Protein   6.4 R  6.5 R 6.9 R 7.3 R   Total Bilirubin   0.3 R  0.4 R 0.5 R 0.6 R   ALT   15 R  11 R 21 R 7 Low  R   AST   14 Low  R  15 R 21 R 11 Low  R   Alkaline Phosphatase   139 High  R  124 R 128 R 112 R   GFR Non-       >60 R   GFR        >60 R

## 2024-07-27 ASSESSMENT — PROMIS GLOBAL HEALTH SCALE
IN GENERAL, HOW WOULD YOU RATE YOUR PHYSICAL HEALTH [ON A SCALE OF 1 (POOR) TO 5 (EXCELLENT)]?: GOOD
IN GENERAL, PLEASE RATE HOW WELL YOU CARRY OUT YOUR USUAL SOCIAL ACTIVITIES (INCLUDES ACTIVITIES AT HOME, AT WORK, AND IN YOUR COMMUNITY, AND RESPONSIBILITIES AS A PARENT, CHILD, SPOUSE, EMPLOYEE, FRIEND, ETC) [ON A SCALE OF 1 (POOR) TO 5 (EXCELLENT)]?: GOOD
HOW IS THE PROMIS V1.1 BEING ADMINISTERED?: ELECTRONIC
IN THE PAST 7 DAYS, HOW WOULD YOU RATE YOUR PAIN ON AVERAGE [ON A SCALE FROM 0 (NO PAIN) TO 10 (WORST IMAGINABLE PAIN)]?: 0 NO PAIN
IN GENERAL, HOW WOULD YOU RATE YOUR SATISFACTION WITH YOUR SOCIAL ACTIVITIES AND RELATIONSHIPS [ON A SCALE OF 1 (POOR) TO 5 (EXCELLENT)]?: GOOD
SUM OF RESPONSES TO QUESTIONS 3, 6, 7, & 8: 11
IN THE PAST 7 DAYS, HOW WOULD YOU RATE YOUR PAIN ON AVERAGE [ON A SCALE FROM 0 (NO PAIN) TO 10 (WORST IMAGINABLE PAIN)]?: 0 NO PAIN
IN GENERAL, PLEASE RATE HOW WELL YOU CARRY OUT YOUR USUAL SOCIAL ACTIVITIES (INCLUDES ACTIVITIES AT HOME, AT WORK, AND IN YOUR COMMUNITY, AND RESPONSIBILITIES AS A PARENT, CHILD, SPOUSE, EMPLOYEE, FRIEND, ETC) [ON A SCALE OF 1 (POOR) TO 5 (EXCELLENT)]?: GOOD
WHO IS THE PERSON COMPLETING THE PROMIS V1.1 SURVEY?: SELF
IN GENERAL, HOW WOULD YOU RATE YOUR MENTAL HEALTH, INCLUDING YOUR MOOD AND YOUR ABILITY TO THINK [ON A SCALE OF 1 (POOR) TO 5 (EXCELLENT)]?: VERY GOOD
IN THE PAST 7 DAYS, HOW OFTEN HAVE YOU BEEN BOTHERED BY EMOTIONAL PROBLEMS, SUCH AS FEELING ANXIOUS, DEPRESSED, OR IRRITABLE [ON A SCALE FROM 1 (NEVER) TO 5 (ALWAYS)]?: SOMETIMES
SUM OF RESPONSES TO QUESTIONS 2, 4, 5, & 10: 14
WHO IS THE PERSON COMPLETING THE PROMIS V1.1 SURVEY?: SELF
TO WHAT EXTENT ARE YOU ABLE TO CARRY OUT YOUR EVERYDAY PHYSICAL ACTIVITIES SUCH AS WALKING, CLIMBING STAIRS, CARRYING GROCERIES, OR MOVING A CHAIR [ON A SCALE OF 1 (NOT AT ALL) TO 5 (COMPLETELY)]?: MOSTLY
IN THE PAST 7 DAYS, HOW WOULD YOU RATE YOUR FATIGUE ON AVERAGE [ON A SCALE FROM 1 (NONE) TO 5 (VERY SEVERE)]?: MILD
IN GENERAL, WOULD YOU SAY YOUR HEALTH IS...[ON A SCALE OF 1 (POOR) TO 5 (EXCELLENT)]: VERY GOOD
TO WHAT EXTENT ARE YOU ABLE TO CARRY OUT YOUR EVERYDAY PHYSICAL ACTIVITIES SUCH AS WALKING, CLIMBING STAIRS, CARRYING GROCERIES, OR MOVING A CHAIR [ON A SCALE OF 1 (NOT AT ALL) TO 5 (COMPLETELY)]?: MOSTLY
HOW IS THE PROMIS V1.1 BEING ADMINISTERED?: ELECTRONIC
IN GENERAL, HOW WOULD YOU RATE YOUR SATISFACTION WITH YOUR SOCIAL ACTIVITIES AND RELATIONSHIPS [ON A SCALE OF 1 (POOR) TO 5 (EXCELLENT)]?: GOOD
IN GENERAL, WOULD YOU SAY YOUR QUALITY OF LIFE IS...[ON A SCALE OF 1 (POOR) TO 5 (EXCELLENT)]: VERY GOOD
IN THE PAST 7 DAYS, HOW WOULD YOU RATE YOUR FATIGUE ON AVERAGE [ON A SCALE FROM 1 (NONE) TO 5 (VERY SEVERE)]?: MILD
IN THE PAST 7 DAYS, HOW OFTEN HAVE YOU BEEN BOTHERED BY EMOTIONAL PROBLEMS, SUCH AS FEELING ANXIOUS, DEPRESSED, OR IRRITABLE [ON A SCALE FROM 1 (NEVER) TO 5 (ALWAYS)]?: SOMETIMES

## 2024-07-29 ENCOUNTER — OFFICE VISIT (OUTPATIENT)
Dept: ORTHOPEDIC SURGERY | Age: 54
End: 2024-07-29
Payer: COMMERCIAL

## 2024-07-29 VITALS — HEIGHT: 64 IN | RESPIRATION RATE: 14 BRPM | BODY MASS INDEX: 58.36 KG/M2 | HEART RATE: 100 BPM | OXYGEN SATURATION: 95 %

## 2024-07-29 DIAGNOSIS — Z96.651 S/P TOTAL KNEE ARTHROPLASTY, RIGHT: Primary | ICD-10-CM

## 2024-07-29 PROCEDURE — 3017F COLORECTAL CA SCREEN DOC REV: CPT | Performed by: ORTHOPAEDIC SURGERY

## 2024-07-29 PROCEDURE — 1036F TOBACCO NON-USER: CPT | Performed by: ORTHOPAEDIC SURGERY

## 2024-07-29 PROCEDURE — G8427 DOCREV CUR MEDS BY ELIG CLIN: HCPCS | Performed by: ORTHOPAEDIC SURGERY

## 2024-07-29 PROCEDURE — G8417 CALC BMI ABV UP PARAM F/U: HCPCS | Performed by: ORTHOPAEDIC SURGERY

## 2024-07-29 PROCEDURE — 99213 OFFICE O/P EST LOW 20 MIN: CPT | Performed by: ORTHOPAEDIC SURGERY

## 2024-07-29 ASSESSMENT — ENCOUNTER SYMPTOMS
COLOR CHANGE: 0
CHEST TIGHTNESS: 0
ABDOMINAL PAIN: 0
BACK PAIN: 0
EYE REDNESS: 0

## 2024-07-29 NOTE — PROGRESS NOTES
Subjective:      Patient ID: Edna Harris is a 53 y.o. female.    Patient returns to the office for a 1 year post op of a right TKA. Patient stated that she is doing very well and has no concerns with the knee today.         She comes in today for her 1 year postop recheck after right TKA.  Overall she states that she is feeling great and has not had any pain or issues with her right knee or her left knee since I saw her last.    Patient denies any new injury to the involved extremity/ joint, denies numbness or tingling in the involved extremity and denies fever or chills.    She has continued walking and working on weight loss.            Review of Systems   Constitutional:  Negative for activity change, chills and fever.   HENT:  Negative for congestion and drooling.    Eyes:  Negative for redness.   Respiratory:  Negative for chest tightness.    Cardiovascular:  Negative for chest pain.   Gastrointestinal:  Negative for abdominal pain.   Endocrine: Negative for cold intolerance and heat intolerance.   Musculoskeletal:  Negative for arthralgias, back pain, gait problem, joint swelling and myalgias.   Skin:  Negative for color change, pallor, rash and wound.   Neurological:  Negative for weakness and numbness.   Psychiatric/Behavioral:  Negative for confusion.        Past Medical History:   Diagnosis Date    Arthritis     Cancer (HCC)     [place] [facility] Ovarian and endometrial CA    Diabetes (HCC)     Endometrial cancer (HCC)     GERD (gastroesophageal reflux disease)     H/O cardiovascular stress test 03/17/2014    EF58% normal study    H/O echocardiogram 03/17/2014    EF 55%, normal LV systolic function, mildly dilated left atrium, normal sized right ventricle, normal valves, mild mitral and tricuspid regurg.    H/O echocardiogram 03/17/2014    Hyperlipidemia     Ovarian cancer (HCC)        Objective:   Physical Exam  Constitutional:       Appearance: She is well-developed.   HENT:      Head:

## 2024-07-29 NOTE — PROGRESS NOTES
Patient returns to the office for a 1 year post op of a right TKA. Patient stated that she is doing very well and has no concerns with the knee today.

## 2024-08-21 ENCOUNTER — OFFICE VISIT (OUTPATIENT)
Dept: CARDIOLOGY CLINIC | Age: 54
End: 2024-08-21
Payer: COMMERCIAL

## 2024-08-21 VITALS
HEART RATE: 68 BPM | DIASTOLIC BLOOD PRESSURE: 74 MMHG | HEIGHT: 64 IN | SYSTOLIC BLOOD PRESSURE: 122 MMHG | OXYGEN SATURATION: 98 % | WEIGHT: 293 LBS | BODY MASS INDEX: 50.02 KG/M2

## 2024-08-21 DIAGNOSIS — I10 HYPERTENSION, UNSPECIFIED TYPE: Primary | ICD-10-CM

## 2024-08-21 DIAGNOSIS — E66.01 CLASS 3 SEVERE OBESITY WITH SERIOUS COMORBIDITY AND BODY MASS INDEX (BMI) OF 50.0 TO 59.9 IN ADULT, UNSPECIFIED OBESITY TYPE (HCC): ICD-10-CM

## 2024-08-21 DIAGNOSIS — E11.9 TYPE 2 DIABETES MELLITUS WITHOUT COMPLICATION, WITHOUT LONG-TERM CURRENT USE OF INSULIN (HCC): ICD-10-CM

## 2024-08-21 PROCEDURE — 3074F SYST BP LT 130 MM HG: CPT | Performed by: NURSE PRACTITIONER

## 2024-08-21 PROCEDURE — 3078F DIAST BP <80 MM HG: CPT | Performed by: NURSE PRACTITIONER

## 2024-08-21 PROCEDURE — 99214 OFFICE O/P EST MOD 30 MIN: CPT | Performed by: NURSE PRACTITIONER

## 2024-08-21 PROCEDURE — 1036F TOBACCO NON-USER: CPT | Performed by: NURSE PRACTITIONER

## 2024-08-21 PROCEDURE — 3017F COLORECTAL CA SCREEN DOC REV: CPT | Performed by: NURSE PRACTITIONER

## 2024-08-21 PROCEDURE — G8427 DOCREV CUR MEDS BY ELIG CLIN: HCPCS | Performed by: NURSE PRACTITIONER

## 2024-08-21 PROCEDURE — 3046F HEMOGLOBIN A1C LEVEL >9.0%: CPT | Performed by: NURSE PRACTITIONER

## 2024-08-21 PROCEDURE — G8417 CALC BMI ABV UP PARAM F/U: HCPCS | Performed by: NURSE PRACTITIONER

## 2024-08-21 PROCEDURE — 2022F DILAT RTA XM EVC RTNOPTHY: CPT | Performed by: NURSE PRACTITIONER

## 2024-08-21 ASSESSMENT — ENCOUNTER SYMPTOMS
SHORTNESS OF BREATH: 0
COUGH: 0

## 2024-08-21 NOTE — PATIENT INSTRUCTIONS
Please be informed that if you contact our office outside of normal business hours the physician on call cannot help with any scheduling or rescheduling issues, procedure instruction questions or any type of medication issue.    We advise you for any urgent/emergency that you go to the nearest emergency room!    PLEASE CALL OUR OFFICE DURING NORMAL BUSINESS HOURS    Monday - Friday   8 am to 5 pm    [place]: [phone]    [place]: [phone]    [place]:  [phone]    **It is YOUR responsibilty to bring medication bottles and/or updated medication list to EACH APPOINTMENT. This will allow us to better serve you and all your healthcare needs**    Thank you for allowing us to care for you today!   We want to ensure we can follow your treatment plan and we strive to give you the best outcomes and experience possible.   If you ever have a life threatening emergency and call 911 - for an ambulance (EMS)   Our providers can only care for you at:   [facility] or [facility].   Even if you have someone take you or you drive yourself we can only care for you in a [facility] facility. Our providers are not setup at the other healthcare locations!     We are committed to providing you the best care possible.    If you receive a survey after visiting one of our offices, please take time to share your experience concerning your physician office visit.  These surveys are confidential and no health information about you is shared.    We are eager to improve for you and we are counting on your feedback to help make that happen.

## 2024-08-21 NOTE — PROGRESS NOTES
1 Device by Does not apply route Daily 5/13/24  Yes Rosy Reed, APRN - CNP   pantoprazole (PROTONIX) 40 MG tablet Take 2 tablets by mouth daily   Yes Provider, Historical, MD   TRULICITY 0.75 MG/0.5ML SOPN 0.5 mLs once a week Takes on Friday 9/3/22  Yes Provider, MD [place]   metFORMIN (GLUCOPHAGE) 850 MG tablet 2 times daily 9/3/22  Yes Provider, MD [place]   atorvastatin (LIPITOR) 20 MG tablet Take 1 tablet by mouth daily 2/23/22 8/21/24 Yes Provider, Historical, MD   glyBURIDE (DIABETA) 5 MG tablet 2 tablets 2 times daily (with meals) 2/14/14  Yes Provider, Historical, MD       Physical Exam  Vitals reviewed.   Constitutional:       General: She is not in acute distress.     Appearance: Normal appearance. She is obese. She is not ill-appearing.   HENT:      Head: Atraumatic.   Neck:      Vascular: No carotid bruit.   Cardiovascular:      Rate and Rhythm: Normal rate and regular rhythm.      Pulses: Normal pulses.      Heart sounds: Normal heart sounds. No murmur heard.  Pulmonary:      Effort: Pulmonary effort is normal. No respiratory distress.      Breath sounds: Normal breath sounds.   Musculoskeletal:         General: No swelling or deformity.      Cervical back: Neck supple. No muscular tenderness.   Neurological:      Mental Status: She is alert.         Lab Review   Lab Results   Component Value Date/Time    CHOL 144 01/16/2023 08:30 AM    TRIG 121 01/16/2023 08:30 AM    HDL 49 01/16/2023 08:30 AM        NM stress 12/9/2022   Summary   Supervising physician Dr. Begum .   ECG portion of stress test is negative for ischemia by diagnostic criteria.   Normal EF 68 % with normal ventricular contractility. No infarct or ischemia noted.   Anterior wall defect consistent with breast artifact   No ischemia , Normal stress test    Echo 12/12/2022   Summary   Left ventricular function and size is normal, EF is estimated at 55-60%.   Mild left ventricular hypertrophy.   Grade I diastolic

## 2024-09-16 RX ORDER — LISINOPRIL 10 MG/1
10 TABLET ORAL 2 TIMES DAILY
Qty: 60 TABLET | Refills: 5 | Status: SHIPPED | OUTPATIENT
Start: 2024-09-16

## (undated) DEVICE — NEEDLE SPNL L3.5IN PNK HUB S STL REG WALL FIT STYL W/ QNCKE

## (undated) DEVICE — DRAPE,U/ SHT,SPLIT,PLAS,STERIL: Brand: MEDLINE

## (undated) DEVICE — SUTURE ABSORBABLE BRAIDED 2-0 CT-1 27 IN UD VICRYL J259H

## (undated) DEVICE — SYRINGE 20ML LL S/C 50

## (undated) DEVICE — TOWEL,OR,DSP,ST,BLUE,STD,6/PK,12PK/CS: Brand: MEDLINE

## (undated) DEVICE — BLADE SAGITAL 18X90X1.27MM

## (undated) DEVICE — GOWN,ECLIPSE,POLYRNF,BRTHSLV,L,30/CS: Brand: MEDLINE

## (undated) DEVICE — GLOVE SURG SZ 65 L12IN FNGR THK79MIL GRN LTX FREE

## (undated) DEVICE — BLADE SURG NO15 C STL SHRP PREM

## (undated) DEVICE — GLOVE ORANGE PI 8   MSG9080

## (undated) DEVICE — GLOVE SURG SZ 8 L12IN THK75MIL DK GRN LTX FREE

## (undated) DEVICE — BANDAGE,ELASTIC,ESMARK,STERILE,4"X9',LF: Brand: MEDLINE

## (undated) DEVICE — SUTURE FIBERWIRE SZ 5 L38IN NONABSORBABLE BLU L48MM 1/2 AR7211

## (undated) DEVICE — APPLICATOR MEDICATED 26 CC SOLUTION HI LT ORNG CHLORAPREP

## (undated) DEVICE — ZIMMER® STERILE DISPOSABLE TOURNIQUET CUFF WITH PLC, DUAL PORT, SINGLE BLADDER, 18 IN. (46 CM)

## (undated) DEVICE — SURGICAL PROCEDURE PACK TOT KNEE LF

## (undated) DEVICE — GLOVE SURG SZ 65 THK91MIL LTX FREE SYN POLYISOPRENE

## (undated) DEVICE — PADDING CAST W2INXL4YD COT LO LINTING WYTEX

## (undated) DEVICE — T4 HOOD

## (undated) DEVICE — HOOD: Brand: T7PLUS

## (undated) DEVICE — COUNTER NDL 30 COUNT FOAM STRP SGL MAG

## (undated) DEVICE — CURITY NON-ADHERENT STRIPS: Brand: CURITY

## (undated) DEVICE — Z INACTIVE USE 2863041 SPONGE GZ W4XL4IN 100% COT 16 PLY RADPQ HIGHLY ABSRB

## (undated) DEVICE — PADDING CAST W3INXL4YD COT BLEND MIC PLEAT UNDERCAST SPEC

## (undated) DEVICE — DRAPE SHEET ULTRAGARD: Brand: MEDLINE

## (undated) DEVICE — FAN SPRAY KIT: Brand: PULSAVAC®

## (undated) DEVICE — SUTURE VCRL SZ 1 L27IN ABSRB UD L36MM CT-1 1/2 CIR JJ40G

## (undated) DEVICE — TOTAL KNEE PK

## (undated) DEVICE — NEEDLE HYPO 25GA L1.5IN BLU POLYPR HUB S STL REG BVL STR

## (undated) DEVICE — SUTURE ABSORBABLE 3-0 PS-1 18 IN UD MONOCRYL + STRATAFIX SXMP1B102

## (undated) DEVICE — GLOVE ORANGE PI 7 1/2   MSG9075

## (undated) DEVICE — GLOVE SURG SZ 6 CRM LTX FREE POLYISOPRENE POLYMER BEAD ANTI

## (undated) DEVICE — DRAPE SURGICAL HAND PROX AURORA

## (undated) DEVICE — SYRINGE MED 10ML LUERLOCK TIP W/O SFTY DISP

## (undated) DEVICE — ZIMMER® STERILE DISPOSABLE TOURNIQUET CUFF WITH PLC, DUAL PORT, SINGLE BLADDER, 30 IN. (76 CM)

## (undated) DEVICE — GLOVE SURG SZ 7 L12IN FNGR THK79MIL GRN LTX FREE

## (undated) DEVICE — SOLUTION IRRIG 1000ML STRL H2O USP PLAS POUR BTL

## (undated) DEVICE — ADHESIVE SKIN CLOSURE WND 8.661X1.5 IN 22 CM LIQUIBAND SECUR

## (undated) DEVICE — 2108 SERIES SAGITTAL FAN BLADE (33.0 X 0.88 X 64.0MM)

## (undated) DEVICE — FORCEPS BX L240CM JAW DIA2.8MM L CAP W/ NDL MIC MESH TOOTH

## (undated) DEVICE — STRYKER PERFORMANCE SERIES SAGITTAL BLADE: Brand: STRYKER PERFORMANCE SERIES

## (undated) DEVICE — SHEET PT TRANSFER 80X40 IN LAT AIR NYL GRY COMFORT GLIDE

## (undated) DEVICE — HOOD WITH PEEL AWAY FACE SHIELD: Brand: T7PLUS

## (undated) DEVICE — COVER,TABLE,44X90,STERILE: Brand: MEDLINE

## (undated) DEVICE — BANDAGE COMPR W3INXL5YD WHT BGE POLY COT M E WRP WV HK AND

## (undated) DEVICE — HEAVY DUTY SAGITTAL FAN BLADE (33.5 X 0.77 X 64.0MM)

## (undated) DEVICE — Z DISCONTINUED NO SUB IDED TUBING ETCO2 AD L6.5FT NSL ORAL CVD PRNG NONFLARED TIP OVR

## (undated) DEVICE — MARKER SURG SKIN UTIL REGULAR/FINE 2 TIP W/ RUL AND 9 LBL

## (undated) DEVICE — STERILE LATEX POWDER-FREE SURGICAL GLOVESWITH NITRILE COATING: Brand: PROTEXIS

## (undated) DEVICE — PACK,BASIC,IX: Brand: MEDLINE

## (undated) DEVICE — Device

## (undated) DEVICE — GOWN,SIRUS,POLYRNF,BRTHSLV,XLN/XL,20/CS: Brand: MEDLINE

## (undated) DEVICE — SPONGE GZ W4XL8IN COT WVN 12 PLY

## (undated) DEVICE — SUTURE VCRL SZ 2-0 L18IN ABSRB UD CT-1 L36MM 1/2 CIR J839D

## (undated) DEVICE — SYSTEM SKIN CLSR 22CM DERMBND PRINEO

## (undated) DEVICE — SOLUTION PREP PAINT POV IOD FOR SKIN MUCOUS MEM

## (undated) DEVICE — SUTURE ETHLN SZ 3-0 L30IN NONABSORBABLE BLK FS-1 L24MM 3/8 669H

## (undated) DEVICE — Z DISCONTINUED (USE MFG CAT MVABO)  TUBING GAS SAMPLING STD 6.5 FT FEMALE CONN SMRT CAPNOLINE